# Patient Record
Sex: FEMALE | Race: BLACK OR AFRICAN AMERICAN | Employment: UNEMPLOYED | URBAN - METROPOLITAN AREA
[De-identification: names, ages, dates, MRNs, and addresses within clinical notes are randomized per-mention and may not be internally consistent; named-entity substitution may affect disease eponyms.]

---

## 2017-02-08 ENCOUNTER — PATIENT OUTREACH (OUTPATIENT)
Dept: FAMILY MEDICINE CLINIC | Facility: CLINIC | Age: 82
End: 2017-02-08

## 2017-02-08 NOTE — PROGRESS NOTES
Called patient's pharmacy [facility] at [facility] and [address]. Spoke to pharmacist. [facility] to fax patient's immunization records. Office contact information given to pharmacist at [facility].

## 2017-02-22 ENCOUNTER — HOSPITAL ENCOUNTER (OUTPATIENT)
Dept: LAB | Age: 82
Discharge: HOME | End: 2017-02-22
Payer: MEDICARE

## 2017-02-22 ENCOUNTER — OFFICE VISIT (OUTPATIENT)
Dept: FAMILY MEDICINE CLINIC | Facility: CLINIC | Age: 82
End: 2017-02-22

## 2017-02-22 VITALS
RESPIRATION RATE: 14 BRPM | HEIGHT: 67 IN | TEMPERATURE: 97.5 F | SYSTOLIC BLOOD PRESSURE: 130 MMHG | BODY MASS INDEX: 28.56 KG/M2 | DIASTOLIC BLOOD PRESSURE: 78 MMHG | WEIGHT: 182 LBS | HEART RATE: 71 BPM | OXYGEN SATURATION: 98 %

## 2017-02-22 DIAGNOSIS — E11.9 TYPE 2 DIABETES MELLITUS WITHOUT COMPLICATION, WITHOUT LONG-TERM CURRENT USE OF INSULIN (HCC): Primary | ICD-10-CM

## 2017-02-22 DIAGNOSIS — E78.5 HYPERLIPIDEMIA, UNSPECIFIED HYPERLIPIDEMIA TYPE: ICD-10-CM

## 2017-02-22 DIAGNOSIS — S46.912A SHOULDER STRAIN, LEFT, INITIAL ENCOUNTER: ICD-10-CM

## 2017-02-22 DIAGNOSIS — E11.9 TYPE 2 DIABETES MELLITUS WITHOUT COMPLICATION, WITHOUT LONG-TERM CURRENT USE OF INSULIN (HCC): ICD-10-CM

## 2017-02-22 DIAGNOSIS — I10 ESSENTIAL HYPERTENSION: ICD-10-CM

## 2017-02-22 DIAGNOSIS — S46.911A SHOULDER STRAIN, RIGHT, INITIAL ENCOUNTER: ICD-10-CM

## 2017-02-22 LAB
ALT SERPL-CCNC: 28 U/L (ref 13–56)
ANION GAP BLD CALC-SCNC: 7 MMOL/L (ref 3–18)
APPEARANCE UR: CLEAR
AST SERPL W P-5'-P-CCNC: 16 U/L (ref 15–37)
BILIRUB UR QL: NEGATIVE
BUN SERPL-MCNC: 13 MG/DL (ref 7–18)
BUN/CREAT SERPL: 17 (ref 12–20)
CALCIUM SERPL-MCNC: 9.9 MG/DL (ref 8.5–10.1)
CHLORIDE SERPL-SCNC: 102 MMOL/L (ref 100–108)
CO2 SERPL-SCNC: 31 MMOL/L (ref 21–32)
COLOR UR: YELLOW
CREAT SERPL-MCNC: 0.75 MG/DL (ref 0.6–1.3)
EST. AVERAGE GLUCOSE BLD GHB EST-MCNC: 140 MG/DL
GLUCOSE SERPL-MCNC: 102 MG/DL (ref 74–99)
GLUCOSE UR STRIP.AUTO-MCNC: NEGATIVE MG/DL
HBA1C MFR BLD: 6.5 % (ref 4.2–5.6)
HGB UR QL STRIP: NEGATIVE
KETONES UR QL STRIP.AUTO: NEGATIVE MG/DL
LEUKOCYTE ESTERASE UR QL STRIP.AUTO: NEGATIVE
NITRITE UR QL STRIP.AUTO: NEGATIVE
PH UR STRIP: 5 [PH] (ref 5–8)
POTASSIUM SERPL-SCNC: 4.2 MMOL/L (ref 3.5–5.5)
PROT UR STRIP-MCNC: NEGATIVE MG/DL
SODIUM SERPL-SCNC: 140 MMOL/L (ref 136–145)
SP GR UR REFRACTOMETRY: 1.01 (ref 1–1.03)
UROBILINOGEN UR QL STRIP.AUTO: 0.2 EU/DL (ref 0.2–1)

## 2017-02-22 PROCEDURE — 36415 COLL VENOUS BLD VENIPUNCTURE: CPT | Performed by: FAMILY MEDICINE

## 2017-02-22 PROCEDURE — 84460 ALANINE AMINO (ALT) (SGPT): CPT | Performed by: FAMILY MEDICINE

## 2017-02-22 PROCEDURE — 84450 TRANSFERASE (AST) (SGOT): CPT | Performed by: FAMILY MEDICINE

## 2017-02-22 PROCEDURE — 81003 URINALYSIS AUTO W/O SCOPE: CPT | Performed by: FAMILY MEDICINE

## 2017-02-22 PROCEDURE — 80048 BASIC METABOLIC PNL TOTAL CA: CPT | Performed by: FAMILY MEDICINE

## 2017-02-22 PROCEDURE — 83036 HEMOGLOBIN GLYCOSYLATED A1C: CPT | Performed by: FAMILY MEDICINE

## 2017-02-22 RX ORDER — AMLODIPINE BESYLATE 2.5 MG/1
2.5 TABLET ORAL DAILY
Qty: 90 TAB | Refills: 3 | Status: SHIPPED | OUTPATIENT
Start: 2017-02-22 | End: 2018-02-12 | Stop reason: SDUPTHER

## 2017-02-22 NOTE — PROGRESS NOTES
SUBJECTIVE     Chief Complaint   Patient presents with    Hypertension    Cholesterol Problem    Diabetes       HPI:   Her BP is running good at home. Her glucose is running better with life style. She did not increase Glucophage. Her rhinitis is being treated with Flonase. Her dizziness has not returned. She has shoulder area soreness both front and back since she started swimming more vigorously last several days. No recent injury. Her lipids are being treated. She is following diet for lipids and DM. She denies any hemodynamic symptoms. PMH: Hypertension 2006, DM II 2005, Hyperlipidemia, BRIAN, Chronic Allergic Rhinitis, Iron and Folate Deficiency Anemia, Glaucoma, Thyroid Nodules (neg FNA), Seborrheic Karatosis, Cataract Surgery 2012, Hysterectomy 1993 (Fibroids), Bunionectomy 1995, Herniorrhapy 2002, Colonic Polyps (due 2017). Current Outpatient Prescriptions   Medication Sig    triamcinolone acetonide (KENALOG) 0.1 % topical cream Use as directed.  metFORMIN (GLUCOPHAGE) 500 mg tablet Take 2 Tabs by mouth two (2) times daily (with meals). Indications: type 2 diabetes mellitus (Patient taking differently: Take 500 mg by mouth two (2) times daily (with meals). Indications: type 2 diabetes mellitus)    glucose blood VI test strips (FREESTYLE LITE STRIPS) strip Use as directed to check blood sugar once a day    losartan (COZAAR) 100 mg tablet Take 1 Tab by mouth daily.  cetirizine (ZYRTEC) 10 mg tablet Take 1 Tab by mouth daily.  fluticasone (FLONASE) 50 mcg/actuation nasal spray 2 Sprays by Both Nostrils route two (2) times a day.  atorvastatin (LIPITOR) 10 mg tablet Take 1 Tab by mouth daily.  hydrochlorothiazide (HYDRODIURIL) 12.5 mg tablet Take 1 Tab by mouth daily.  amLODIPine (NORVASC) 2.5 mg tablet Take 1 Tab by mouth daily.     Blood-Glucose Meter (FREESTYLE LITE METER) monitoring kit Use as directed to check blood sugar once a day    Lancets (FREESTYLE LANCETS) misc Use as directed to check blood sugar once a day.  latanoprost (XALATAN) 0.005 % ophthalmic solution Administer 1 Drop to both eyes nightly. No current facility-administered medications for this visit. Allergies: No known allergy to drugs. ROS:   Constitutional: No fever, chills, night sweats, malaise, dizziness. Ear/Nose/Throat: No ear/ sinus pain, lesions, unusual discharge, new speaking or hearing problems, nose bleed. Cardiovascular: No angina, palpitations, PND, orthopnea, lightheadedness, edema, claudication. Respiratory: No dyspnea, wheeze, pleurisy, hemoptysis, unusual cough or sputum. Gastrointestinal: No nausea/ vomiting, bowel habit change, pain, ERA symptoms, melena, hematochezia, anorexia. Psychiatric: No agitation, confusion/disorientation, suicidal or homicidal ideation. Musculoskeletal: No focal weakness. Musculoskeletal: bilateral shoulder area soreness as above; no joint swelling, instability, focal weakness, stiffness/rigidity, radicular pain. OBJECTIVE   Constitutional: General Appearance: well developed, overweight, nontoxic, in no acute distress. Visit Vitals    /78 (BP 1 Location: Right arm, BP Patient Position: Sitting)    Pulse 71    Temp 97.5 °F (36.4 °C) (Oral)    Resp 14    Ht 5' 7\" (1.702 m)    Wt 182 lb (82.6 kg)    SpO2 98%    BMI 28.51 kg/m2     ENT: Otoscopic Examination: external auditory canals are clear; tympanic membranes are dull. Nasal Cavity: mildly swollen mucosa & turbinates. Maxillas are not tender w/o redness or heat. Throat: clear tonsils, oropharynx, posterior pharynx. Pulmonary: Respiratory effort: normal; no dyspnea, no retractions, no accessory muscle use. Auscultation: normal & symmetrical air exchange; no rales, no rhonchi, no wheeze; no rubs     Cardiovascular: Palpation: PMI not displaced or enlarged, no thrills or heaves. Auscultation: RRR; no murmur, rubs or gallops.  Extremities: no edema, no active varicosity. GI[de-identified] Normal bowel sounds. No masses; no tenderness; no rebound/rigidity; no CVA tenderness. Bladder: no fullnesses, tenderness or palpable masses. Psychiatric[de-identified] Oriented to time, place and person. Normal mood, no agitation or anxiety. Normal affect. Pleasant and cooperative. Neurological[de-identified] CN I to XII: intact. DTR: symmetrical & normal.    Musculoskeletal[de-identified] NC/AT, face is symmetrical, neck is supple. Gait/stn: normal.  Both shoulder areas- No heat, effusion, redness or swelling with mild diffuse muscle tenderness, anterior- and posterioly. Good ROM. No instability. Strength OK. Lab Results   Component Value Date/Time    WBC 5.2 08/01/2016 10:14 AM    HGB 13.4 08/01/2016 10:14 AM    HCT 39.4 08/01/2016 10:14 AM    PLATELET 724 44/91/4463 10:14 AM    MCV 77 08/01/2016 10:14 AM     Lab Results   Component Value Date/Time    Sodium 142 08/01/2016 10:14 AM    Potassium 3.9 08/01/2016 10:14 AM    Chloride 101 08/01/2016 10:14 AM    CO2 29 08/01/2016 10:14 AM    Anion gap 11.6 08/01/2016 10:14 AM    Glucose 127 08/01/2016 10:14 AM    BUN 13 08/01/2016 10:14 AM    Creatinine 0.7 08/01/2016 10:14 AM    BUN/Creatinine ratio 18 02/01/2016 12:29 PM    GFR est AA >60 02/01/2016 12:29 PM    GFR est non-AA >60 02/01/2016 12:29 PM    Calcium 9.8 08/01/2016 10:14 AM    Bilirubin, total 0.5 08/01/2016 10:14 AM    AST (SGOT) 21 08/01/2016 10:14 AM    Alk.  phosphatase 87 08/01/2016 10:14 AM    Protein, total 8.0 08/01/2016 10:14 AM    Albumin 3.9 08/01/2016 10:14 AM    Globulin 4.1 08/01/2016 10:14 AM    A-G Ratio 1.0 08/01/2016 10:14 AM    ALT (SGPT) 22 08/01/2016 10:14 AM     Lab Results   Component Value Date/Time    Cholesterol, total 147 08/01/2016 10:14 AM    HDL Cholesterol 81 08/01/2016 10:14 AM    LDL, calculated 53 08/01/2016 10:14 AM    VLDL, calculated 14 08/01/2016 10:14 AM    Triglyceride 69 08/01/2016 10:14 AM     Lab Results   Component Value Date/Time    Hemoglobin A1c 6.8 08/01/2016 10:14 AM    Hemoglobin A1c (POC) 7.1 11/18/2016 11:48 AM      ASSESSMENT     1. Type 2 diabetes mellitus without complication, without long-term current use of insulin ([facility] [address]    2. Essential hypertension    3. Hyperlipidemia, unspecified hyperlipidemia type    4. Shoulder strain, left, initial encounter    5. Shoulder strain, right, initial encounter        PLAN   Pharmacologic Management: Medications reviewed with the patient. No change. Orders Placed This Encounter    METABOLIC PANEL, BASIC    ALT    AST    HEMOGLOBIN A1C WITH EAG    URINALYSIS W/ RFLX MICROSCOPIC    amLODIPine (NORVASC) 2.5 mg tablet     Non-Pharmacologic Management:  Discussed DDx, follow-up & work-up. Discussed risk/benefit & side effect of treatment. Low Salt ADA diet, weightloss & graduated excecise. Gentle shoulder ROM exercises, moist heat and posture care to avoid further strain. Follow up as planned, prn sooner. Regular BS check at home and notify MD of results prn. Health risk from non adherence discussed. Patent voiced understanding. Follow-up Disposition:  Return in about 3 months (around 5/22/2017).     Grant Ang MD

## 2017-02-22 NOTE — PROGRESS NOTES
Mercy Elmore is a [de-identified] y.o.  female presents today for office visit for follow up. Pt is in Room # 4      1. Have you been to the ER, urgent care clinic since your last visit? Hospitalized since your last visit? No    2. Have you seen or consulted any other health care providers outside of the [address] [place] [address] since your last visit? Include any pap smears or colon screening. No    No Patient Care Coordination Note on file.     [facility] reviewed

## 2017-02-23 ENCOUNTER — TELEPHONE (OUTPATIENT)
Dept: FAMILY MEDICINE CLINIC | Facility: CLINIC | Age: 82
End: 2017-02-23

## 2017-05-24 ENCOUNTER — OFFICE VISIT (OUTPATIENT)
Dept: FAMILY MEDICINE CLINIC | Facility: CLINIC | Age: 82
End: 2017-05-24

## 2017-05-24 VITALS
OXYGEN SATURATION: 100 % | SYSTOLIC BLOOD PRESSURE: 130 MMHG | TEMPERATURE: 97.5 F | DIASTOLIC BLOOD PRESSURE: 70 MMHG | HEIGHT: 67 IN | RESPIRATION RATE: 14 BRPM | BODY MASS INDEX: 28.25 KG/M2 | WEIGHT: 180 LBS | HEART RATE: 73 BPM

## 2017-05-24 DIAGNOSIS — E78.5 HYPERLIPIDEMIA, UNSPECIFIED HYPERLIPIDEMIA TYPE: ICD-10-CM

## 2017-05-24 DIAGNOSIS — I10 ESSENTIAL HYPERTENSION: ICD-10-CM

## 2017-05-24 DIAGNOSIS — S89.91XA KNEE INJURY, RIGHT, INITIAL ENCOUNTER: ICD-10-CM

## 2017-05-24 DIAGNOSIS — R42 VERTIGO: ICD-10-CM

## 2017-05-24 DIAGNOSIS — J30.9 CHRONIC ALLERGIC RHINITIS: ICD-10-CM

## 2017-05-24 DIAGNOSIS — E11.9 TYPE 2 DIABETES MELLITUS WITHOUT COMPLICATION, WITHOUT LONG-TERM CURRENT USE OF INSULIN (HCC): Primary | ICD-10-CM

## 2017-05-24 DIAGNOSIS — J01.91 ACUTE RECURRENT SINUSITIS, UNSPECIFIED LOCATION: ICD-10-CM

## 2017-05-24 LAB — HBA1C MFR BLD HPLC: 6.3 %

## 2017-05-24 RX ORDER — MECLIZINE HYDROCHLORIDE 25 MG/1
25 TABLET ORAL
Qty: 30 TAB | Refills: 0 | Status: SHIPPED | OUTPATIENT
Start: 2017-05-24 | End: 2021-05-21 | Stop reason: ALTCHOICE

## 2017-05-24 RX ORDER — DICLOFENAC SODIUM 10 MG/G
GEL TOPICAL
COMMUNITY
Start: 2017-05-19 | End: 2019-03-04

## 2017-05-24 RX ORDER — AMOXICILLIN 500 MG/1
1000 CAPSULE ORAL 2 TIMES DAILY
Qty: 40 CAP | Refills: 0 | Status: SHIPPED | OUTPATIENT
Start: 2017-05-24 | End: 2017-06-03

## 2017-05-24 NOTE — MR AVS SNAPSHOT
Visit Information Date & Time Provider Department Dept. Phone Encounter #  
 5/24/2017 11:00 AM Prakash Carrillo MD Antel[facility] [address] [phone] [id number] Your Appointments 8/24/2017 11:30 AM  
Follow Up with Prakash Carrillo MD  
Antelmo Bernal ([address]) Appt Note: Return in about 3 months (around 8/24/2017). [address] [place] [address] [phone]  
[address] [place] [id number] Upcoming Health Maintenance Date Due DTaP/Tdap/Td series (1 - Tdap) 10/10/1956 ZOSTER VACCINE AGE 60> 10/10/1996 Pneumococcal 65+ Low/Medium Risk (2 of 2 - PPSV23) 3/19/2016 MICROALBUMIN Q1 8/1/2017 LIPID PANEL Q1 8/1/2017 INFLUENZA AGE 9 TO ADULT 8/1/2017 HEMOGLOBIN A1C Q6M 8/22/2017 EYE EXAM RETINAL OR DILATED Q1 10/21/2017 FOOT EXAM Q1 11/18/2017 MEDICARE YEARLY EXAM 11/19/2017 GLAUCOMA SCREENING Q2Y 10/21/2018 Allergies as of 5/24/2017  Review Complete On: 5/24/2017 By: Prakash Carrillo MD  
  
 Severity Noted Reaction Type Reactions Latex, Natural Rubber Medium 11/30/2015    Other (comments) Adhesive  06/08/2015    Contact Dermatitis LEAVES DA ON SKIN 
USE PAPER TAPE Current Immunizations  Reviewed on 6/8/2015 Name Date Pneumococcal Conjugate (PCV-13) 3/19/2015 Not reviewed this visit You Were Diagnosed With   
  
 Codes Comments Type 2 diabetes mellitus without complication, without long-term current use of insulin ([facility])    -  Primary ICD-10-CM: E11.9 ICD-9-CM: 250.00 Vertigo     ICD-10-CM: O73 ICD-9-CM: 780.4 Hyperlipidemia, unspecified hyperlipidemia type     ICD-10-CM: E78.5 ICD-9-CM: 272.4 Essential hypertension     ICD-10-CM: I10 
ICD-9-CM: 401.9 Acute recurrent sinusitis, unspecified location     ICD-10-CM: J01.91 
ICD-9-CM: 461.9 Chronic allergic rhinitis     ICD-10-CM: J30.9 ICD-9-CM: 477.9 Vitals BP Pulse Temp Resp Height(growth percentile) Weight(growth percentile) 130/70 (BP 1 Location: Left arm, BP Patient Position: Sitting) 73 97.5 °F (36.4 °C) (Oral) 14 5' 7\" (1.702 m) 180 lb (81.6 kg) SpO2 BMI OB Status Smoking Status 100% 28.19 kg/m2 Hysterectomy Former Smoker Vitals History BMI and BSA Data Body Mass Index Body Surface Area  
 28.19 kg/m 2 1.96 m 2 Preferred Pharmacy Pharmacy Name Phone [address], Mehrdad Barnhart Julianne [phone] Your Updated Medication List  
  
   
This list is accurate as of: 5/24/17 12:02 PM.  Always use your most recent med list. amLODIPine 2.5 mg tablet Commonly known as:  Alejandro Durie Take 1 Tab by mouth daily. amoxicillin 500 mg capsule Commonly known as:  AMOXIL Take 2 Caps by mouth two (2) times a day for 10 days. atorvastatin 10 mg tablet Commonly known as:  LIPITOR Take 1 Tab by mouth daily. Blood-Glucose Meter monitoring kit Commonly known as:  FREESTYLE LITE METER Use as directed to check blood sugar once a day  
  
 cetirizine 10 mg tablet Commonly known as:  ZYRTEC Take 1 Tab by mouth daily. fluticasone 50 mcg/actuation nasal spray Commonly known as:  Beverly Najjar 2 Sprays by Both Nostrils route two (2) times a day. glucose blood VI test strips strip Commonly known as:  FREESTYLE LITE STRIPS Use as directed to check blood sugar once a day  
  
 hydroCHLOROthiazide 12.5 mg tablet Commonly known as:  HYDRODIURIL Take 1 Tab by mouth daily. Lancets Misc Commonly known as:  FREESTYLE LANCETS Use as directed to check blood sugar once a day. losartan 100 mg tablet Commonly known as:  COZAAR Take 1 Tab by mouth daily. meclizine 25 mg tablet Commonly known as:  ANTIVERT Take 1 Tab by mouth three (3) times daily as needed. metFORMIN 500 mg tablet Commonly known as:  GLUCOPHAGE Take 2 Tabs by mouth two (2) times daily (with meals). Indications: type 2 diabetes mellitus  
  
 triamcinolone acetonide 0.1 % topical cream  
Commonly known as:  KENALOG Use as directed. VOLTAREN 1 % Gel Generic drug:  diclofenac XALATAN 0.005 % ophthalmic solution Generic drug:  latanoprost  
Administer 1 Drop to both eyes nightly. Prescriptions Sent to Pharmacy Refills  
 amoxicillin (AMOXIL) 500 mg capsule 0 Sig: Take 2 Caps by mouth two (2) times a day for 10 days. Class: Normal  
 Pharmacy: [address] [facility] [address] [facility] Ph #: [phone] Route: Oral  
 meclizine (ANTIVERT) 25 mg tablet 0 Sig: Take 1 Tab by mouth three (3) times daily as needed. Class: Normal  
 Pharmacy: [address] [facility] [address] [facility] Ph #: [phone] Route: Oral  
  
We Performed the Following AMB POC HEMOGLOBIN A1C [14909 CPT(R)] Introducing [facility]! Dear Parker Whatley: 
Thank you for requesting a [employer] account. Our records indicate that you already have an active [employer] account. You can access your account anytime at [email or web address] Did you know that you can access your hospital and ER discharge instructions at any time in [employer]? You can also review all of your test results from your hospital stay or ER visit. Additional Information If you have questions, please visit the Frequently Asked Questions section of the [employer] website at [email or web address] Remember, [employer] is NOT to be used for urgent needs. For medical emergencies, dial 911. Now available from your iPhone and Android! Please provide this summary of care documentation to your next provider. Your primary care clinician is listed as [address] [place] [address].  If you have any questions after today's visit, please call [phone].

## 2017-05-24 NOTE — PROGRESS NOTES
Carol Hernández is a [de-identified] y.o.  female presents today for office visit for follow up. Pt is in Room # 4      1. Have you been to the ER, urgent care clinic since your last visit? Hospitalized since your last visit? Yes When: May 11 Where: [facility] Reason for visit: Hurt Knee    2. Have you seen or consulted any other health care providers outside of the [address] [place] [address] since your last visit? Include any pap smears or colon screening. Orthopedic may 17th    No Patient Care Coordination Note on file.

## 2017-05-24 NOTE — PROGRESS NOTES
SUBJECTIVE     Chief Complaint   Patient presents with    Diabetes    Dizziness    Hypertension    Sinus Infection       HPI:     She has occasional mild pain in her face. She also has occasional Lt ear was popping. She had moderate dysbalance / dizziness few days ago. Today the dysbalance is better. She has been using Flonase; but not taking Zyrtec. She had same problem twice in the last 5 years and was better after Rx for sinus infection. She went to ER with RT Knee injury 2 weeks ago. It is better now. She is seeing orthopedist.    Her BP is running good with current Rx. Her glucose is running better with life style and Glucophage. Her lipids are being treated. She is following diet for lipids and DM. No other Systemic, Cardiovascular or Respiratory symptoms. PMH: Hypertension 2006, DM II 2005, Hyperlipidemia, BRIAN, Chronic Allergic Rhinitis, Iron and Folate Deficiency Anemia, Glaucoma, Thyroid Nodules (neg FNA), Seborrheic Karatosis, Cataract Surgery 2012, Hysterectomy 1993 (Fibroids), Bunionectomy 1995, Herniorrhapy 2002, Colonic Polyps (due 2017). Current Outpatient Prescriptions   Medication Sig    VOLTAREN 1 % gel     amLODIPine (NORVASC) 2.5 mg tablet Take 1 Tab by mouth daily.  triamcinolone acetonide (KENALOG) 0.1 % topical cream Use as directed.  metFORMIN (GLUCOPHAGE) 500 mg tablet Take 2 Tabs by mouth two (2) times daily (with meals). Indications: type 2 diabetes mellitus (Patient taking differently: Take 500 mg by mouth two (2) times daily (with meals). Indications: type 2 diabetes mellitus)    glucose blood VI test strips (FREESTYLE LITE STRIPS) strip Use as directed to check blood sugar once a day    losartan (COZAAR) 100 mg tablet Take 1 Tab by mouth daily.  cetirizine (ZYRTEC) 10 mg tablet Take 1 Tab by mouth daily.  fluticasone (FLONASE) 50 mcg/actuation nasal spray 2 Sprays by Both Nostrils route two (2) times a day.     atorvastatin (LIPITOR) 10 mg tablet Take 1 Tab by mouth daily.  hydrochlorothiazide (HYDRODIURIL) 12.5 mg tablet Take 1 Tab by mouth daily.  Blood-Glucose Meter (FREESTYLE LITE METER) monitoring kit Use as directed to check blood sugar once a day    Lancets (FREESTYLE LANCETS) misc Use as directed to check blood sugar once a day.  latanoprost (XALATAN) 0.005 % ophthalmic solution Administer 1 Drop to both eyes nightly. No current facility-administered medications for this visit. Not taking Zyrtec. Allergies: No known allergy to drugs. ROS:   Constitutional: No fever, chills, night sweats, malaise. Ear/Nose/Throat: No ear/ throat pain, lesions, unusual discharge, new speaking or hearing problems, nose bleed. Cardiovascular: No angina, palpitations, PND, orthopnea, lightheadedness, edema, claudication. Respiratory: No dyspnea, wheeze, pleurisy, hemoptysis, unusual cough or sputum. Gastrointestinal: No nausea/ vomiting, bowel habit change, pain, ERA symptoms, melena, hematochezia, anorexia. Psychiatric: No agitation, confusion/disorientation, suicidal or homicidal ideation. Musculoskeletal: No focal weakness. Musculoskeletal: no other complaints. OBJECTIVE   Constitutional: General Appearance: well developed, overweight, nontoxic, in no acute distress. Visit Vitals    /70 (BP 1 Location: Left arm, BP Patient Position: Sitting)    Pulse 73    Temp 97.5 °F (36.4 °C) (Oral)    Resp 14    Ht 5' 7\" (1.702 m)    Wt 180 lb (81.6 kg)    SpO2 100%    BMI 28.19 kg/m2     ENT: Otoscopic Examination: external auditory canals are clear; tympanic membranes are dull. Nasal Cavity: mildly swollen mucosa & turbinates. Maxillas are not tender w/o redness or heat. Throat: clear tonsils, oropharynx, posterior pharynx. Pulmonary: Respiratory effort: normal; no dyspnea, no retractions, no accessory muscle use.  Auscultation: normal & symmetrical air exchange; no rales, no rhonchi, no wheeze; no rubs Cardiovascular: Palpation: PMI not displaced or enlarged, no thrills or heaves. Auscultation: RRR; no murmur, rubs or gallops. Extremities: no edema, no active varicosity. GI[de-identified] Normal bowel sounds. No masses; no tenderness; no rebound/rigidity; no CVA tenderness. Bladder: no fullnesses, tenderness or palpable masses. Psychiatric[de-identified] Oriented to time, place and person. Normal mood, no agitation or anxiety. Normal affect. Pleasant and cooperative. Neurological[de-identified] CN I to XII: intact. DTR: symmetrical & normal.    Musculoskeletal[de-identified] NC/AT, face is symmetrical, neck is supple. Gait/stn: good w/ Rt Knee in splint. No effusion, redness or heat. Lab Results   Component Value Date/Time    WBC 5.2 08/01/2016 10:14 AM    HGB 13.4 08/01/2016 10:14 AM    HCT 39.4 08/01/2016 10:14 AM    PLATELET 969 04/18/2976 10:14 AM    MCV 77 08/01/2016 10:14 AM     Lab Results   Component Value Date/Time    Sodium 140 02/22/2017 11:46 AM    Potassium 4.2 02/22/2017 11:46 AM    Chloride 102 02/22/2017 11:46 AM    CO2 31 02/22/2017 11:46 AM    Anion gap 7 02/22/2017 11:46 AM    Glucose 102 02/22/2017 11:46 AM    BUN 13 02/22/2017 11:46 AM    Creatinine 0.75 02/22/2017 11:46 AM    BUN/Creatinine ratio 17 02/22/2017 11:46 AM    GFR est AA >60 02/22/2017 11:46 AM    GFR est non-AA >60 02/22/2017 11:46 AM    Calcium 9.9 02/22/2017 11:46 AM    Bilirubin, total 0.5 08/01/2016 10:14 AM    AST (SGOT) 16 02/22/2017 11:46 AM    Alk.  phosphatase 87 08/01/2016 10:14 AM    Protein, total 8.0 08/01/2016 10:14 AM    Albumin 3.9 08/01/2016 10:14 AM    Globulin 4.1 08/01/2016 10:14 AM    A-G Ratio 1.0 08/01/2016 10:14 AM    ALT (SGPT) 28 02/22/2017 11:46 AM     Lab Results   Component Value Date/Time    Cholesterol, total 147 08/01/2016 10:14 AM    HDL Cholesterol 81 08/01/2016 10:14 AM    LDL, calculated 53 08/01/2016 10:14 AM    VLDL, calculated 14 08/01/2016 10:14 AM    Triglyceride 69 08/01/2016 10:14 AM     Lab Results   Component Value Date/Time    Hemoglobin A1c 6.5 02/22/2017 11:46 AM    Hemoglobin A1c (POC) 6.3 05/24/2017 11:59 AM        ASSESSMENT     1. Type 2 diabetes mellitus without complication, without long-term current use of insulin ([facility])    2. Vertigo    3. Hyperlipidemia, unspecified hyperlipidemia type    4. Essential hypertension    5. Acute recurrent sinusitis, unspecified location    6. Chronic allergic rhinitis    7. Knee injury, right, initial encounter        PLAN   Pharmacologic Management: Medications reviewed with the patient. Amox 1 gm bid; Antivert 25 tid with caution. Orders Placed This Encounter    AMB POC HEMOGLOBIN A1C    VOLTAREN 1 % gel    amoxicillin (AMOXIL) 500 mg capsule    meclizine (ANTIVERT) 25 mg tablet     Patient to consider referral to ENT. Discussed DDx, follow-up & work-up. Discussed risk/benefit & side effect of treatment. Low Salt ADA diet, weightloss & graduated excecise. Follow up as planned, prn sooner. Regular BS check at home and notify MD of results prn. Health risk from non adherence discussed. Patent voiced understanding. Follow-up Disposition:  Return in about 3 months (around 8/24/2017), or if symptoms worsen or fail to improve.     Harmony Randolph MD

## 2017-06-06 ENCOUNTER — TELEPHONE (OUTPATIENT)
Dept: FAMILY MEDICINE CLINIC | Facility: CLINIC | Age: 82
End: 2017-06-06

## 2017-06-06 DIAGNOSIS — R42 DIZZINESS: Primary | ICD-10-CM

## 2017-06-22 ENCOUNTER — OFFICE VISIT (OUTPATIENT)
Dept: FAMILY MEDICINE CLINIC | Facility: CLINIC | Age: 82
End: 2017-06-22

## 2017-06-22 VITALS
TEMPERATURE: 98.5 F | RESPIRATION RATE: 16 BRPM | OXYGEN SATURATION: 98 % | SYSTOLIC BLOOD PRESSURE: 130 MMHG | HEIGHT: 67 IN | HEART RATE: 70 BPM | WEIGHT: 178 LBS | BODY MASS INDEX: 27.94 KG/M2 | DIASTOLIC BLOOD PRESSURE: 82 MMHG

## 2017-06-22 DIAGNOSIS — H81.312 VERTIGO, AURAL, LEFT: Primary | ICD-10-CM

## 2017-06-22 NOTE — PROGRESS NOTES
Rome Cortes is a [de-identified] y.o.  female presents today for office visit for acute care. Pt is in Room # 6      1. Have you been to the ER, urgent care clinic since your last visit? Hospitalized since your last visit? No    2. Have you seen or consulted any other health care providers outside of the [address] [place] [address] since your last visit? Include any pap smears or colon screening. No    No Patient Care Coordination Note on file.

## 2017-06-22 NOTE — PROGRESS NOTES
SUBJECTIVE     Chief Complaint   Patient presents with    Dizziness       HPI:     She has occasional mild pain in her face. She also has occasional Lt ear was popping. She had moderate dysbalance / dizziness 4-5 weeks ago. She had same problem twice in the last 5 years and was better after Rx for sinus infection. She was seen here 4 weeks ago and given Amox for sinusitis. She still has the dizziness. She has been referred; but has not been seen there yet. She denies any other focal neurologic symptoms. Her BP is running good with current Rx. Her glucose is running good with life style and Glucophage. No other Systemic, Cardiovascular or Respiratory symptoms. PMH: Hypertension 2006, DM II 2005, Hyperlipidemia, BRIAN, Chronic Allergic Rhinitis, Iron and Folate Deficiency Anemia, Glaucoma, Thyroid Nodules (neg FNA), Seborrheic Karatosis, Cataract Surgery 2012, Hysterectomy 1993 (Fibroids), Bunionectomy 1995, Herniorrhapy 2002, Colonic Polyps (due 2017). Current Outpatient Prescriptions   Medication Sig    VOLTAREN 1 % gel     meclizine (ANTIVERT) 25 mg tablet Take 1 Tab by mouth three (3) times daily as needed.  amLODIPine (NORVASC) 2.5 mg tablet Take 1 Tab by mouth daily.  triamcinolone acetonide (KENALOG) 0.1 % topical cream Use as directed.  metFORMIN (GLUCOPHAGE) 500 mg tablet Take 2 Tabs by mouth two (2) times daily (with meals). Indications: type 2 diabetes mellitus (Patient taking differently: Take 500 mg by mouth two (2) times daily (with meals). Indications: type 2 diabetes mellitus)    glucose blood VI test strips (FREESTYLE LITE STRIPS) strip Use as directed to check blood sugar once a day    losartan (COZAAR) 100 mg tablet Take 1 Tab by mouth daily.  cetirizine (ZYRTEC) 10 mg tablet Take 1 Tab by mouth daily.  fluticasone (FLONASE) 50 mcg/actuation nasal spray 2 Sprays by Both Nostrils route two (2) times a day.     atorvastatin (LIPITOR) 10 mg tablet Take 1 Tab by mouth daily.  hydrochlorothiazide (HYDRODIURIL) 12.5 mg tablet Take 1 Tab by mouth daily.  Blood-Glucose Meter (FREESTYLE LITE METER) monitoring kit Use as directed to check blood sugar once a day    Lancets (FREESTYLE LANCETS) misc Use as directed to check blood sugar once a day.  latanoprost (XALATAN) 0.005 % ophthalmic solution Administer 1 Drop to both eyes nightly. No current facility-administered medications for this visit. Not taking Zyrtec. Allergies: No known allergy to drugs. ROS:   Constitutional: No fever, chills, night sweats, malaise. Ear/Nose/Throat: No ear/ throat pain, lesions, unusual discharge, new speaking or hearing problems, nose bleed. Cardiovascular: No angina, palpitations, PND, orthopnea, lightheadedness, edema, claudication. Respiratory: No dyspnea, wheeze, pleurisy, hemoptysis, unusual cough or sputum. Gastrointestinal: No nausea/ vomiting, bowel habit change, pain, ERA symptoms, melena, hematochezia, anorexia. Psychiatric: No agitation, confusion/disorientation, suicidal or homicidal ideation. Musculoskeletal: No focal weakness. Musculoskeletal: no other complaints. OBJECTIVE   Constitutional: General Appearance: well developed, overweight, nontoxic, in no acute distress. Visit Vitals    /82 (BP 1 Location: Left arm, BP Patient Position: Sitting)    Pulse 70    Resp 16    Ht 5' 7\" (1.702 m)    Wt 178 lb (80.7 kg)    SpO2 98%    BMI 27.88 kg/m2     ENT: Otoscopic Examination: external auditory canals are clear; tympanic membranes are dull (Lt>>Rt). Nasal Cavity: mildly swollen mucosa & turbinates. Maxillas are not tender w/o redness or heat. Throat: clear tonsils, oropharynx, posterior pharynx. Pulmonary: Respiratory effort: normal; no dyspnea, no retractions, no accessory muscle use.  Auscultation: normal & symmetrical air exchange; no rales, no rhonchi, no wheeze; no rubs     Cardiovascular: Palpation: PMI not displaced or enlarged, no thrills or heaves. Auscultation: RRR; no murmur, rubs or gallops. Extremities: no edema, no active varicosity. GI[de-identified] Normal bowel sounds. No masses; no tenderness; no rebound/rigidity; no CVA tenderness. Bladder: no fullnesses, tenderness or palpable masses. Psychiatric[de-identified] Oriented to time, place and person. Normal mood, no agitation or anxiety. Normal affect. Pleasant and cooperative. Neurological[de-identified] CN I to XII: intact. DTR: symmetrical & normal.  Head tilt to Lt reproduces the symptom. No sustained nystagmus. Musculoskeletal[de-identified] NC/AT, face is symmetrical, neck is supple. Gait/stn: good w/ Rt Knee in splint. No effusion, redness or heat. Lab Results   Component Value Date/Time    WBC 5.2 08/01/2016 10:14 AM    HGB 13.4 08/01/2016 10:14 AM    HCT 39.4 08/01/2016 10:14 AM    PLATELET 418 46/39/3775 10:14 AM    MCV 77 08/01/2016 10:14 AM     Lab Results   Component Value Date/Time    Sodium 140 02/22/2017 11:46 AM    Potassium 4.2 02/22/2017 11:46 AM    Chloride 102 02/22/2017 11:46 AM    CO2 31 02/22/2017 11:46 AM    Anion gap 7 02/22/2017 11:46 AM    Glucose 102 02/22/2017 11:46 AM    BUN 13 02/22/2017 11:46 AM    Creatinine 0.75 02/22/2017 11:46 AM    BUN/Creatinine ratio 17 02/22/2017 11:46 AM    GFR est AA >60 02/22/2017 11:46 AM    GFR est non-AA >60 02/22/2017 11:46 AM    Calcium 9.9 02/22/2017 11:46 AM    Bilirubin, total 0.5 08/01/2016 10:14 AM    AST (SGOT) 16 02/22/2017 11:46 AM    Alk.  phosphatase 87 08/01/2016 10:14 AM    Protein, total 8.0 08/01/2016 10:14 AM    Albumin 3.9 08/01/2016 10:14 AM    Globulin 4.1 08/01/2016 10:14 AM    A-G Ratio 1.0 08/01/2016 10:14 AM    ALT (SGPT) 28 02/22/2017 11:46 AM     Lab Results   Component Value Date/Time    Cholesterol, total 147 08/01/2016 10:14 AM    HDL Cholesterol 81 08/01/2016 10:14 AM    LDL, calculated 53 08/01/2016 10:14 AM    VLDL, calculated 14 08/01/2016 10:14 AM    Triglyceride 69 08/01/2016 10:14 AM     Lab Results Component Value Date/Time    Hemoglobin A1c 6.5 02/22/2017 11:46 AM    Hemoglobin A1c (POC) 6.3 05/24/2017 11:59 AM        ASSESSMENT     1. Vertigo, aural, left        PLAN   Pharmacologic Management: Medications reviewed with the patient. Orders Placed This Encounter    REFERRAL TO ENT-OTOLARYNGOLOGY     Patient to consider referral to ENT. Discussed DDx, follow-up & work-up. Discussed risk/benefit & side effect of treatment. Low Salt ADA diet, weightloss & graduated excecise. Follow up as planned, prn sooner. PRN to ER. Regular BS check at home and notify MD of results prn. Health risk from non adherence discussed. Patent voiced understanding.     Feng Back MD

## 2017-07-31 RX ORDER — LOSARTAN POTASSIUM 100 MG/1
100 TABLET ORAL DAILY
Qty: 90 TAB | Refills: 1 | Status: SHIPPED | OUTPATIENT
Start: 2017-07-31 | End: 2018-02-12 | Stop reason: SDUPTHER

## 2017-07-31 NOTE — TELEPHONE ENCOUNTER
Pt called requesting a refill of /cozaar to be sent electronically to     [address] [facility], [facility] [address]  [address] [place] [phone]  Phone: [phone] Fax: [phone]

## 2017-08-21 ENCOUNTER — OFFICE VISIT (OUTPATIENT)
Dept: FAMILY MEDICINE CLINIC | Facility: CLINIC | Age: 82
End: 2017-08-21

## 2017-08-21 ENCOUNTER — HOSPITAL ENCOUNTER (OUTPATIENT)
Dept: LAB | Age: 82
Discharge: HOME | End: 2017-08-21
Payer: MEDICARE

## 2017-08-21 VITALS
SYSTOLIC BLOOD PRESSURE: 142 MMHG | RESPIRATION RATE: 16 BRPM | HEIGHT: 67 IN | DIASTOLIC BLOOD PRESSURE: 78 MMHG | TEMPERATURE: 98.3 F | OXYGEN SATURATION: 97 % | WEIGHT: 181 LBS | BODY MASS INDEX: 28.41 KG/M2 | HEART RATE: 72 BPM

## 2017-08-21 DIAGNOSIS — J30.9 CHRONIC ALLERGIC RHINITIS: ICD-10-CM

## 2017-08-21 DIAGNOSIS — E11.9 TYPE 2 DIABETES MELLITUS WITHOUT COMPLICATION, WITHOUT LONG-TERM CURRENT USE OF INSULIN (HCC): Primary | ICD-10-CM

## 2017-08-21 DIAGNOSIS — E78.5 HYPERLIPIDEMIA, UNSPECIFIED HYPERLIPIDEMIA TYPE: ICD-10-CM

## 2017-08-21 DIAGNOSIS — I10 ESSENTIAL HYPERTENSION: ICD-10-CM

## 2017-08-21 DIAGNOSIS — E11.9 TYPE 2 DIABETES MELLITUS WITHOUT COMPLICATION, WITHOUT LONG-TERM CURRENT USE OF INSULIN (HCC): ICD-10-CM

## 2017-08-21 LAB
ALBUMIN SERPL-MCNC: 3.6 G/DL (ref 3.4–5)
ALBUMIN/GLOB SERPL: 1 {RATIO} (ref 0.8–1.7)
ALP SERPL-CCNC: 71 U/L (ref 45–117)
ALT SERPL-CCNC: 24 U/L (ref 13–56)
ANION GAP SERPL CALC-SCNC: 7 MMOL/L (ref 3–18)
AST SERPL-CCNC: 18 U/L (ref 15–37)
BASOPHILS # BLD: 0 K/UL (ref 0–0.06)
BASOPHILS NFR BLD: 1 % (ref 0–2)
BILIRUB SERPL-MCNC: 0.3 MG/DL (ref 0.2–1)
BUN SERPL-MCNC: 11 MG/DL (ref 7–18)
BUN/CREAT SERPL: 15 (ref 12–20)
CALCIUM SERPL-MCNC: 9.2 MG/DL (ref 8.5–10.1)
CHLORIDE SERPL-SCNC: 104 MMOL/L (ref 100–108)
CO2 SERPL-SCNC: 30 MMOL/L (ref 21–32)
CREAT SERPL-MCNC: 0.73 MG/DL (ref 0.6–1.3)
DIFFERENTIAL METHOD BLD: NORMAL
EOSINOPHIL # BLD: 0.1 K/UL (ref 0–0.4)
EOSINOPHIL NFR BLD: 1 % (ref 0–5)
ERYTHROCYTE [DISTWIDTH] IN BLOOD BY AUTOMATED COUNT: 14.2 % (ref 11.6–14.5)
EST. AVERAGE GLUCOSE BLD GHB EST-MCNC: 143 MG/DL
GLOBULIN SER CALC-MCNC: 3.5 G/DL (ref 2–4)
GLUCOSE SERPL-MCNC: 71 MG/DL (ref 74–99)
HBA1C MFR BLD: 6.6 % (ref 4.2–5.6)
HCT VFR BLD AUTO: 37.2 % (ref 35–45)
HGB BLD-MCNC: 12.8 G/DL (ref 12–16)
LYMPHOCYTES # BLD: 2.5 K/UL (ref 0.9–3.6)
LYMPHOCYTES NFR BLD: 46 % (ref 21–52)
MCH RBC QN AUTO: 26.8 PG (ref 24–34)
MCHC RBC AUTO-ENTMCNC: 34.4 G/DL (ref 31–37)
MCV RBC AUTO: 77.8 FL (ref 74–97)
MONOCYTES # BLD: 0.5 K/UL (ref 0.05–1.2)
MONOCYTES NFR BLD: 10 % (ref 3–10)
NEUTS SEG # BLD: 2.2 K/UL (ref 1.8–8)
NEUTS SEG NFR BLD: 42 % (ref 40–73)
PLATELET # BLD AUTO: 212 K/UL (ref 135–420)
PMV BLD AUTO: 10.8 FL (ref 9.2–11.8)
POTASSIUM SERPL-SCNC: 3.8 MMOL/L (ref 3.5–5.5)
PROT SERPL-MCNC: 7.1 G/DL (ref 6.4–8.2)
RBC # BLD AUTO: 4.78 M/UL (ref 4.2–5.3)
SODIUM SERPL-SCNC: 141 MMOL/L (ref 136–145)
TSH SERPL DL<=0.05 MIU/L-ACNC: 1.4 UIU/ML (ref 0.36–3.74)
WBC # BLD AUTO: 5.3 K/UL (ref 4.6–13.2)

## 2017-08-21 PROCEDURE — 84443 ASSAY THYROID STIM HORMONE: CPT | Performed by: FAMILY MEDICINE

## 2017-08-21 PROCEDURE — 85025 COMPLETE CBC W/AUTO DIFF WBC: CPT | Performed by: FAMILY MEDICINE

## 2017-08-21 PROCEDURE — 36415 COLL VENOUS BLD VENIPUNCTURE: CPT | Performed by: FAMILY MEDICINE

## 2017-08-21 PROCEDURE — 83036 HEMOGLOBIN GLYCOSYLATED A1C: CPT | Performed by: FAMILY MEDICINE

## 2017-08-21 PROCEDURE — 80053 COMPREHEN METABOLIC PANEL: CPT | Performed by: FAMILY MEDICINE

## 2017-08-21 RX ORDER — ATORVASTATIN CALCIUM 10 MG/1
10 TABLET, FILM COATED ORAL DAILY
Qty: 90 TAB | Refills: 3 | Status: SHIPPED | OUTPATIENT
Start: 2017-08-21 | End: 2018-08-30 | Stop reason: SDUPTHER

## 2017-08-21 NOTE — MR AVS SNAPSHOT
Visit Information Date & Time Provider Department Dept. Phone Encounter #  
 8/21/2017  1:30 PM Judge Aram MD Antelmo Bernal [phone] [id number] Follow-up Instructions Return in about 3 months (around 11/21/2017) for Fasting. Follow-up and Disposition History Upcoming Health Maintenance Date Due DTaP/Tdap/Td series (1 - Tdap) 10/10/1956 ZOSTER VACCINE AGE 60> 8/10/1996 Pneumococcal 65+ Low/Medium Risk (2 of 2 - PPSV23) 3/19/2016 MICROALBUMIN Q1 8/1/2017 LIPID PANEL Q1 8/1/2017 INFLUENZA AGE 9 TO ADULT 8/1/2017 EYE EXAM RETINAL OR DILATED Q1 10/21/2017 FOOT EXAM Q1 11/18/2017 MEDICARE YEARLY EXAM 11/19/2017 HEMOGLOBIN A1C Q6M 11/24/2017 GLAUCOMA SCREENING Q2Y 10/21/2018 Allergies as of 8/21/2017  Review Complete On: 8/21/2017 By: Judge Aram MD  
  
 Severity Noted Reaction Type Reactions Latex, Natural Rubber Medium 11/30/2015    Other (comments) Adhesive  06/08/2015    Contact Dermatitis LEAVES DA ON SKIN 
USE PAPER TAPE Current Immunizations  Reviewed on 6/8/2015 Name Date Pneumococcal Conjugate (PCV-13) 3/19/2015 Not reviewed this visit You Were Diagnosed With   
  
 Codes Comments Type 2 diabetes mellitus without complication, without long-term current use of insulin (HCC)    -  Primary ICD-10-CM: E11.9 ICD-9-CM: 250.00 Essential hypertension     ICD-10-CM: I10 
ICD-9-CM: 401.9 Hyperlipidemia, unspecified hyperlipidemia type     ICD-10-CM: E78.5 ICD-9-CM: 272.4 Chronic allergic rhinitis     ICD-10-CM: J30.9 ICD-9-CM: 477.9 Vitals BP Pulse Temp Resp Height(growth percentile) Weight(growth percentile) 142/78 (BP 1 Location: Right arm, BP Patient Position: Sitting) 72 98.3 °F (36.8 °C) (Oral) 16 5' 7\" (1.702 m) 181 lb (82.1 kg) SpO2 BMI OB Status Smoking Status 97% 28.35 kg/m2 Hysterectomy Former Smoker Vitals History BMI and BSA Data Body Mass Index Body Surface Area  
 28.35 kg/m 2 1.97 m 2 Preferred Pharmacy Pharmacy Name Phone [address], [place] Rod[place] [phone] Your Updated Medication List  
  
   
This list is accurate as of: 8/21/17  2:14 PM.  Always use your most recent med list. amLODIPine 2.5 mg tablet Commonly known as:  Luis Eduardo Anju Take 1 Tab by mouth daily. atorvastatin 10 mg tablet Commonly known as:  LIPITOR Take 1 Tab by mouth daily. Blood-Glucose Meter monitoring kit Commonly known as:  FREESTYLE LITE METER Use as directed to check blood sugar once a day  
  
 cetirizine 10 mg tablet Commonly known as:  ZYRTEC Take 1 Tab by mouth daily. fluticasone 50 mcg/actuation nasal spray Commonly known as:  Ruthe Para 2 Sprays by Both Nostrils route two (2) times a day. glucose blood VI test strips strip Commonly known as:  FREESTYLE LITE STRIPS Use as directed to check blood sugar once a day  
  
 hydroCHLOROthiazide 12.5 mg tablet Commonly known as:  HYDRODIURIL Take 1 Tab by mouth daily. Lancets Misc Commonly known as:  FREESTYLE LANCETS Use as directed to check blood sugar once a day. losartan 100 mg tablet Commonly known as:  COZAAR Take 1 Tab by mouth daily. meclizine 25 mg tablet Commonly known as:  ANTIVERT Take 1 Tab by mouth three (3) times daily as needed. metFORMIN 500 mg tablet Commonly known as:  GLUCOPHAGE Take 2 Tabs by mouth two (2) times daily (with meals). Indications: type 2 diabetes mellitus  
  
 triamcinolone acetonide 0.1 % topical cream  
Commonly known as:  KENALOG Use as directed. VOLTAREN 1 % Gel Generic drug:  diclofenac XALATAN 0.005 % ophthalmic solution Generic drug:  latanoprost  
Administer 1 Drop to both eyes nightly. Prescriptions Sent to Pharmacy Refills atorvastatin (LIPITOR) 10 mg tablet 3 Sig: Take 1 Tab by mouth daily. Class: Normal  
 [address], [place]  #: [phone] Route: Oral  
  
Follow-up Instructions Return in about 3 months (around 11/21/2017) for Fasting. To-Do List   
 08/21/2017 Lab:  MICROALBUMIN, UR, RAND W/ MICROALBUMIN/CREA RATIO   
  
 08/21/2017 Lab:  URINALYSIS W/ RFLX MICROSCOPIC Introducing [facility] SERVICES! Dear Mattie Dus: 
Thank you for requesting a [employer]! account. Our records indicate that you already have an active J[employer]! account. You can access your account anytime at [email or web address] Did you know that you can access your hospital and ER discharge instructions at any time in Jamba!? You can also review all of your test results from your hospital stay or ER visit. Additional Information If you have questions, please visit the Frequently Asked Questions section of the Jamba! website at [email or web address] Remember, Jamba! is NOT to be used for urgent needs. For medical emergencies, dial 911. Now available from your iPhone and Android! Please provide this summary of care documentation to your next provider. Your primary care clinician is listed as [address] [place] [address]. If you have any questions after today's visit, please call [phone].

## 2017-08-21 NOTE — PROGRESS NOTES
SUBJECTIVE     Chief Complaint   Patient presents with    Diabetes    Hypertension    Dizziness       HPI:     She still has moderate dysbalance / dizziness. She is following up ENT. She has been using Flonase; but not taking Zyrtec. Her BP is running good at home with current Rx. Her glucose is running better with life style and Glucophage. Her lipids are being treated. She is following diet for lipids and DM. No other Systemic, Cardiovascular or Respiratory symptoms. PMH: Hypertension 2006, DM II 2005, Hyperlipidemia, BRIAN, Chronic Allergic Rhinitis, Iron and Folate Deficiency Anemia, Glaucoma, Thyroid Nodules (neg FNA), Seborrheic Karatosis, Cataract Surgery 2012, Hysterectomy 1993 (Fibroids), Bunionectomy 1995, Herniorrhapy 2002, Colonic Polyps (due 2017). Current Outpatient Prescriptions   Medication Sig    losartan (COZAAR) 100 mg tablet Take 1 Tab by mouth daily.  VOLTAREN 1 % gel     meclizine (ANTIVERT) 25 mg tablet Take 1 Tab by mouth three (3) times daily as needed.  amLODIPine (NORVASC) 2.5 mg tablet Take 1 Tab by mouth daily.  triamcinolone acetonide (KENALOG) 0.1 % topical cream Use as directed.  metFORMIN (GLUCOPHAGE) 500 mg tablet Take 2 Tabs by mouth two (2) times daily (with meals). Indications: type 2 diabetes mellitus (Patient taking differently: Take 500 mg by mouth two (2) times daily (with meals). Indications: type 2 diabetes mellitus)    glucose blood VI test strips (FREESTYLE LITE STRIPS) strip Use as directed to check blood sugar once a day    cetirizine (ZYRTEC) 10 mg tablet Take 1 Tab by mouth daily.  fluticasone (FLONASE) 50 mcg/actuation nasal spray 2 Sprays by Both Nostrils route two (2) times a day.  atorvastatin (LIPITOR) 10 mg tablet Take 1 Tab by mouth daily.  hydrochlorothiazide (HYDRODIURIL) 12.5 mg tablet Take 1 Tab by mouth daily.     Blood-Glucose Meter (FREESTYLE LITE METER) monitoring kit Use as directed to check blood sugar once a day    Lancets (FREESTYLE LANCETS) [facility] Use as directed to check blood sugar once a day.  latanoprost (XALATAN) 0.005 % ophthalmic solution Administer 1 Drop to both eyes nightly. No current facility-administered medications for this visit. Not taking Zyrtec. Allergies: No known allergy to drugs. ROS:   Constitutional: No fever, chills, night sweats, malaise. Ear/Nose/Throat: No ear/ throat pain, lesions, unusual discharge, new speaking or hearing problems, nose bleed. Cardiovascular: No angina, palpitations, PND, orthopnea, lightheadedness, edema, claudication. Respiratory: No dyspnea, wheeze, pleurisy, hemoptysis, unusual cough or sputum. Gastrointestinal: No nausea/ vomiting, bowel habit change, pain, ERA symptoms, melena, hematochezia, anorexia. Psychiatric: No agitation, confusion/disorientation, suicidal or homicidal ideation. Musculoskeletal: No focal weakness. Musculoskeletal: no other complaints. OBJECTIVE   Constitutional: General Appearance: well developed, overweight, nontoxic, in no acute distress. Visit Vitals    /78 (BP 1 Location: Right arm, BP Patient Position: Sitting)    Pulse 72    Temp 98.3 °F (36.8 °C) (Oral)    Resp 16    Ht 5' 7\" (1.702 m)    Wt 181 lb (82.1 kg)    SpO2 97%    BMI 28.35 kg/m2     ENT: Otoscopic Examination: external auditory canals are clear; tympanic membranes are dull. Nasal Cavity: mildly swollen mucosa & turbinates. Maxillas are not tender w/o redness or heat. Throat: clear tonsils, oropharynx, posterior pharynx. Pulmonary: Respiratory effort: normal; no dyspnea, no retractions, no accessory muscle use. Auscultation: normal & symmetrical air exchange; no rales, no rhonchi, no wheeze; no rubs     Cardiovascular: Palpation: PMI not displaced or enlarged, no thrills or heaves. Auscultation: RRR; no murmur, rubs or gallops. Extremities: no edema, no active varicosity. GI[de-identified] Normal bowel sounds.   No masses; no tenderness; no rebound/rigidity; no CVA tenderness. Bladder: no fullnesses, tenderness or palpable masses. Psychiatric[de-identified] Oriented to time, place and person. Normal mood, no agitation or anxiety. Normal affect. Pleasant and cooperative. Neurological[de-identified] CN I to XII: intact. DTR: symmetrical & normal.    Musculoskeletal[de-identified] NC/AT, face is symmetrical, neck is supple. .    Lab Results   Component Value Date/Time    WBC 5.2 08/01/2016 10:14 AM    HGB 13.4 08/01/2016 10:14 AM    HCT 39.4 08/01/2016 10:14 AM    PLATELET 438 41/32/2698 10:14 AM    MCV 77 08/01/2016 10:14 AM     Lab Results   Component Value Date/Time    Sodium 140 02/22/2017 11:46 AM    Potassium 4.2 02/22/2017 11:46 AM    Chloride 102 02/22/2017 11:46 AM    CO2 31 02/22/2017 11:46 AM    Anion gap 7 02/22/2017 11:46 AM    Glucose 102 02/22/2017 11:46 AM    BUN 13 02/22/2017 11:46 AM    Creatinine 0.75 02/22/2017 11:46 AM    BUN/Creatinine ratio 17 02/22/2017 11:46 AM    GFR est AA >60 02/22/2017 11:46 AM    GFR est non-AA >60 02/22/2017 11:46 AM    Calcium 9.9 02/22/2017 11:46 AM    Bilirubin, total 0.5 08/01/2016 10:14 AM    AST (SGOT) 16 02/22/2017 11:46 AM    Alk. phosphatase 87 08/01/2016 10:14 AM    Protein, total 8.0 08/01/2016 10:14 AM    Albumin 3.9 08/01/2016 10:14 AM    Globulin 4.1 08/01/2016 10:14 AM    A-G Ratio 1.0 08/01/2016 10:14 AM    ALT (SGPT) 28 02/22/2017 11:46 AM     Lab Results   Component Value Date/Time    Cholesterol, total 147 08/01/2016 10:14 AM    HDL Cholesterol 81 08/01/2016 10:14 AM    LDL, calculated 53 08/01/2016 10:14 AM    VLDL, calculated 14 08/01/2016 10:14 AM    Triglyceride 69 08/01/2016 10:14 AM     Lab Results   Component Value Date/Time    Hemoglobin A1c 6.5 02/22/2017 11:46 AM    Hemoglobin A1c (POC) 6.3 05/24/2017 11:59 AM        ASSESSMENT     1. Type 2 diabetes mellitus without complication, without long-term current use of insulin ([facility] [address]    2. Essential hypertension    3.  Hyperlipidemia, unspecified hyperlipidemia type    4. Chronic allergic rhinitis        PLAN   Pharmacologic Management: Medications reviewed with the patient. Orders Placed This Encounter    MICROALBUMIN, UR, RAND W/ MICROALBUMIN/CREA RATIO    CBC WITH AUTOMATED DIFF    METABOLIC PANEL, COMPREHENSIVE    TSH 3RD GENERATION    HEMOGLOBIN A1C WITH EAG    URINALYSIS W/ RFLX MICROSCOPIC    atorvastatin (LIPITOR) 10 mg tablet     Discussed DDx, follow-up & work-up. Discussed risk/benefit & side effect of treatment. Low Salt ADA diet, weightloss & graduated excecise. Follow up as planned, prn sooner. Regular BS check at home and notify MD of results prn. Health risk from non adherence discussed. Patent voiced understanding. Follow-up Disposition:  Return in about 3 months (around 11/21/2017).     Paulino Bone MD

## 2017-08-21 NOTE — PROGRESS NOTES
Autumn Carvajal is a [de-identified] y.o.  female presents today for office visit for routine follow up. Pt is in Room # 4.      1. Have you been to the ER, urgent care clinic since your last visit? Hospitalized since your last visit? No      2. Have you seen or consulted any other health care providers outside of the [address] [place] [address] since your last visit? Include any pap smears or colon screening. Dentist, Dr. Jacquie Jo and Cardiology Dr. Otis Bethea, [address] [facility] 8/11/17    Health Maintenance reviewed. Upcoming Appts  Dr. Marina Hsieh - 10/2017  Dr. Sathya Brandon, GI -  10/2017 for a colonoscopy.

## 2017-08-22 ENCOUNTER — TELEPHONE (OUTPATIENT)
Dept: FAMILY MEDICINE CLINIC | Facility: CLINIC | Age: 82
End: 2017-08-22

## 2017-08-22 NOTE — PROGRESS NOTES
HgbA1c was stable. Glucose was borderline low. Please check glucose regularly and follow regular meals.

## 2017-10-30 RX ORDER — CETIRIZINE HCL 10 MG
10 TABLET ORAL DAILY
Qty: 90 TAB | Refills: 3 | Status: SHIPPED | OUTPATIENT
Start: 2017-10-30 | End: 2018-02-21

## 2017-10-30 RX ORDER — HYDROCHLOROTHIAZIDE 12.5 MG/1
12.5 TABLET ORAL DAILY
Qty: 90 TAB | Refills: 3 | Status: SHIPPED | OUTPATIENT
Start: 2017-10-30 | End: 2019-01-03 | Stop reason: SDUPTHER

## 2017-11-24 ENCOUNTER — OFFICE VISIT (OUTPATIENT)
Dept: FAMILY MEDICINE CLINIC | Facility: CLINIC | Age: 82
End: 2017-11-24

## 2017-11-24 VITALS
SYSTOLIC BLOOD PRESSURE: 134 MMHG | RESPIRATION RATE: 16 BRPM | WEIGHT: 188 LBS | HEIGHT: 67 IN | OXYGEN SATURATION: 98 % | TEMPERATURE: 97.8 F | DIASTOLIC BLOOD PRESSURE: 84 MMHG | HEART RATE: 80 BPM | BODY MASS INDEX: 29.51 KG/M2

## 2017-11-24 DIAGNOSIS — Z00.00 MEDICARE ANNUAL WELLNESS VISIT, SUBSEQUENT: ICD-10-CM

## 2017-11-24 DIAGNOSIS — Z13.31 SCREENING FOR DEPRESSION: ICD-10-CM

## 2017-11-24 DIAGNOSIS — M19.041 PRIMARY OSTEOARTHRITIS OF BOTH HANDS: ICD-10-CM

## 2017-11-24 DIAGNOSIS — I10 ESSENTIAL HYPERTENSION: ICD-10-CM

## 2017-11-24 DIAGNOSIS — E78.5 HYPERLIPIDEMIA, UNSPECIFIED HYPERLIPIDEMIA TYPE: ICD-10-CM

## 2017-11-24 DIAGNOSIS — E11.9 TYPE 2 DIABETES MELLITUS WITHOUT COMPLICATION, WITHOUT LONG-TERM CURRENT USE OF INSULIN (HCC): ICD-10-CM

## 2017-11-24 DIAGNOSIS — E11.9 TYPE 2 DIABETES MELLITUS WITHOUT COMPLICATION, WITHOUT LONG-TERM CURRENT USE OF INSULIN (HCC): Primary | ICD-10-CM

## 2017-11-24 DIAGNOSIS — M19.042 PRIMARY OSTEOARTHRITIS OF BOTH HANDS: ICD-10-CM

## 2017-11-24 DIAGNOSIS — R10.11 RIGHT UPPER QUADRANT ABDOMINAL PAIN: ICD-10-CM

## 2017-11-24 RX ORDER — METFORMIN HYDROCHLORIDE 500 MG/1
1000 TABLET ORAL 2 TIMES DAILY WITH MEALS
Qty: 180 TAB | Refills: 3 | Status: SHIPPED | OUTPATIENT
Start: 2017-11-24 | End: 2017-11-28 | Stop reason: SDUPTHER

## 2017-11-24 RX ORDER — RANITIDINE 150 MG/1
150 TABLET, FILM COATED ORAL 2 TIMES DAILY
Qty: 60 TAB | Refills: 2 | Status: SHIPPED | OUTPATIENT
Start: 2017-11-24 | End: 2018-02-21

## 2017-11-24 NOTE — PROGRESS NOTES
Girma Mabry is a 81 y.o. female and presents for annual Medicare Wellness Visit. Problem List: Reviewed with patient and discussed risk factors. Patient Active Problem List   Diagnosis Code    Hypertension I10    Diabetes mellitus ([address] E11.9    Encounter to establish care Z76.89    Tennis elbow M77.10    Trigger finger M65.30    Tendonitis of elbow, right M77.8    Hyperlipemia E78.5    Hot flashes, menopausal N95.1    Vertigo R42    Chronic allergic rhinitis J30.9       Current medical providers:  Patient Care Team:  Ale Campbell MD as PCP - General (Family Practice)  Babak Pettit MD (Cardiology)  John Valdovinos MD (Ophthalmology)  Kimberly Moseley MD (Dermatology)    PMH: Reviewed with patient  Past Medical History:   Diagnosis Date    Anemia     Chronic allergic rhinitis     Diabetes ([address]     Glaucoma     Hypercholesterolemia     Hypertension     Iron deficiency     Multiple thyroid nodules     neg (FNA)    BRIAN (obstructive sleep apnea)     Sleep apnea     Thyroid disease     lumps on thyroid    Trigger finger        PSH: Reviewed with patient  Past Surgical History:   Procedure Laterality Date    HX BUNIONECTOMY  1995    HX CATARACT REMOVAL  2012    correction    HX HERNIA REPAIR  2002    HX HYSTERECTOMY          SH: Reviewed with patient  Social History   Substance Use Topics    Smoking status: Former Smoker     Packs/day: 0.25     Quit date: 1/1/1970    Smokeless tobacco: Never Used      Comment: Quit smoking 1990    Alcohol use No       FH: Reviewed with patient  History reviewed. No pertinent family history. Medications/Allergies: Reviewed with patient  Current Outpatient Prescriptions on File Prior to Visit   Medication Sig Dispense Refill    cetirizine (ZYRTEC) 10 mg tablet Take 1 Tab by mouth daily. 90 Tab 3    hydroCHLOROthiazide (HYDRODIURIL) 12.5 mg tablet Take 1 Tab by mouth daily.  90 Tab 3    atorvastatin (LIPITOR) 10 mg tablet Take 1 Tab by mouth daily. 90 Tab 3    losartan (COZAAR) 100 mg tablet Take 1 Tab by mouth daily. 90 Tab 1    VOLTAREN 1 % gel       meclizine (ANTIVERT) 25 mg tablet Take 1 Tab by mouth three (3) times daily as needed. 30 Tab 0    amLODIPine (NORVASC) 2.5 mg tablet Take 1 Tab by mouth daily. 90 Tab 3    triamcinolone acetonide (KENALOG) 0.1 % topical cream Use as directed.  metFORMIN (GLUCOPHAGE) 500 mg tablet Take 2 Tabs by mouth two (2) times daily (with meals). Indications: type 2 diabetes mellitus (Patient taking differently: Take 500 mg by mouth two (2) times daily (with meals). Indications: type 2 diabetes mellitus) 180 Tab 3    glucose blood VI test strips (FREESTYLE LITE STRIPS) strip Use as directed to check blood sugar once a day 100 Strip 3    fluticasone (FLONASE) 50 mcg/actuation nasal spray 2 Sprays by Both Nostrils route two (2) times a day. 1 Bottle 3    Blood-Glucose Meter (FREESTYLE LITE METER) monitoring kit Use as directed to check blood sugar once a day 1 Kit 0    Lancets (FREESTYLE LANCETS) misc Use as directed to check blood sugar once a day. 100 Each 3    latanoprost (XALATAN) 0.005 % ophthalmic solution Administer 1 Drop to both eyes nightly. No current facility-administered medications on file prior to visit. Allergies   Allergen Reactions    Latex, Natural Rubber Other (comments)    Adhesive Contact Dermatitis     LEAVES DA ON SKIN    USE PAPER TAPE       Objective:  Visit Vitals    /84 (BP 1 Location: Left arm, BP Patient Position: Sitting)    Pulse 80    Temp 97.8 °F (36.6 °C) (Oral)    Resp 16    Ht 5' 7\" (1.702 m)    Wt 188 lb (85.3 kg)    SpO2 98%    BMI 29.44 kg/m2    Body mass index is 29.44 kg/(m^2). Assessment of cognitive impairment:   Alert and oriented x 3  Oriented to situation? yes  Memory Problem? no  Cognition Problem?  no    Depression Screen:   PHQ over the last two weeks 11/24/2017   Little interest or pleasure in doing things Not at all   Feeling down, depressed or hopeless Not at all   Total Score PHQ 2 0       Abuse Screen:   Abuse Screening Questionnaire 11/24/2017   Do you ever feel afraid of your partner? N   Are you in a relationship with someone who physically or mentally threatens you? N   Is it safe for you to go home? Y       Fall Risk Assessment:    Fall Risk Assessment, last 12 mths 11/24/2017   Able to walk? Yes   Fall in past 12 months? No       Functional Ability:   Does the patient exhibit a steady gait? yes   How long did it take the patient to get up and walk from a sitting position? 1 sec   Is the patient self reliant?  (ie can do own laundry, meals, household chores)  yes     Does the patient handle his/her own medications? yes     Does the patient handle his/her own money? yes     Is the patients home safe (ie good lighting, handrails on stairs and bath, etc.)? yes     Did you notice or did patient express any hearing difficulties? no     Did you notice of did patient express any vision difficulties?   no     Were distance and reading eye charts used? yes       Advance Care Planning:   Patient was offered the opportunity to discuss advance care planning:  yes     Does patient have an Advance Directive:  yes   If no, did you provide information on Caring Connections?   yes       Plan:      Orders Placed This Encounter    US ABD LTD    MICROALBUMIN, UR, RAND W/ MICROALBUMIN/CREA RATIO    LIPID PANEL    HEMOGLOBIN A1C WITH EAG    METABOLIC PANEL, COMPREHENSIVE    REFERRAL TO SURGERY     DIABETES FOOT EXAM    [place] DEPRESSION SCREEN ANNUAL    metFORMIN (GLUCOPHAGE) 500 mg tablet    raNITIdine (ZANTAC) 150 mg tablet       Health Maintenance   Topic Date Due    DTaP/Tdap/Td series (1 - Tdap) 10/10/1957    ZOSTER VACCINE AGE 60>  08/10/1996    Pneumococcal 65+ Low/Medium Risk (2 of 2 - PPSV23) 03/19/2016    MICROALBUMIN Q1  08/01/2017    LIPID PANEL Q1  08/01/2017    Influenza Age 5 to Adult  08/01/2017    FOOT EXAM Q1  11/18/2017    MEDICARE YEARLY EXAM  11/19/2017    HEMOGLOBIN A1C Q6M  02/21/2018    EYE EXAM RETINAL OR DILATED Q1  11/02/2018    GLAUCOMA SCREENING Q2Y  11/02/2019    OSTEOPOROSIS SCREENING (DEXA)  Completed     She had her immunizations. She will bring records. Microalbumin and lipids ordered. *Patient verbalized understanding and agreement with the plan. A copy of the After Visit Summary with personalized health plan was given to the patient today.

## 2017-11-24 NOTE — PROGRESS NOTES
SUBJECTIVE     Chief Complaint   Patient presents with    Hypertension    Diabetes    Annual Wellness Visit       HPI:     She no longer has dysbalance / dizziness. She has seen ENT. She has been using Flonase and taking Zyrtec. Her BP is running good at home with current Rx. Her glucose is good with life style and Glucophage. No hypoglycemia or symptoms. Her lipids are being treated. She is following diet for lipids and DM. She has been having tenderness in RUQ of abdomen for several weeks, which is not related to food. She has occasional ERA (regurg.) symptoms. No recent injury. No N/V/D/ anorexia. No fever/ chills/ malaise. No other Systemic, Cardiovascular or Respiratory symptoms. PMH: Hypertension 2006, DM II 2005, Hyperlipidemia, BRIAN, Chronic Allergic Rhinitis, Iron and Folate Deficiency Anemia, Glaucoma, Thyroid Nodules (neg FNA), Seborrheic Karatosis, Cataract Surgery 2012, Hysterectomy 1993 (Fibroids), Bunionectomy 1995, Herniorrhapy 2002, Colonic Polyps (due 2017). Current Outpatient Prescriptions   Medication Sig    cetirizine (ZYRTEC) 10 mg tablet Take 1 Tab by mouth daily.  hydroCHLOROthiazide (HYDRODIURIL) 12.5 mg tablet Take 1 Tab by mouth daily.  atorvastatin (LIPITOR) 10 mg tablet Take 1 Tab by mouth daily.  losartan (COZAAR) 100 mg tablet Take 1 Tab by mouth daily.  VOLTAREN 1 % gel     meclizine (ANTIVERT) 25 mg tablet Take 1 Tab by mouth three (3) times daily as needed.  amLODIPine (NORVASC) 2.5 mg tablet Take 1 Tab by mouth daily.  triamcinolone acetonide (KENALOG) 0.1 % topical cream Use as directed.  metFORMIN (GLUCOPHAGE) 500 mg tablet Take 2 Tabs by mouth two (2) times daily (with meals). Indications: type 2 diabetes mellitus (Patient taking differently: Take 500 mg by mouth two (2) times daily (with meals).  Indications: type 2 diabetes mellitus)    glucose blood VI test strips (FREESTYLE LITE STRIPS) strip Use as directed to check blood sugar once a day    fluticasone (FLONASE) 50 mcg/actuation nasal spray 2 Sprays by Both Nostrils route two (2) times a day.  Blood-Glucose Meter (FREESTYLE LITE METER) monitoring kit Use as directed to check blood sugar once a day    Lancets (FREESTYLE LANCETS) misc Use as directed to check blood sugar once a day.  latanoprost (XALATAN) 0.005 % ophthalmic solution Administer 1 Drop to both eyes nightly. No current facility-administered medications for this visit. Allergies: No known allergy to drugs. ROS:   Constitutional: No fever, chills, night sweats, malaise. Ear/Nose/Throat: No ear/ throat pain, lesions, unusual discharge, new speaking or hearing problems, nose bleed. Cardiovascular: No angina, palpitations, PND, orthopnea, lightheadedness, edema, claudication. Respiratory: No dyspnea, wheeze, pleurisy, hemoptysis, unusual cough or sputum. Gastrointestinal: No nausea/ vomiting, bowel habit change, pain, ERA symptoms, melena, hematochezia, anorexia. Psychiatric: No agitation, confusion/disorientation, suicidal or homicidal ideation. Musculoskeletal: No focal weakness. Musculoskeletal: no other complaints. OBJECTIVE   Constitutional: General Appearance: well developed, overweight, nontoxic, in no acute distress. Visit Vitals    /84 (BP 1 Location: Left arm, BP Patient Position: Sitting)    Pulse 80    Temp 97.8 °F (36.6 °C) (Oral)    Resp 16    Ht 5' 7\" (1.702 m)    Wt 188 lb (85.3 kg)    SpO2 98%    BMI 29.44 kg/m2     ENT: Otoscopic Examination: external auditory canals are clear; tympanic membranes are dull. Nasal Cavity: mildly swollen mucosa & turbinates. Maxillas are not tender w/o redness or heat. Throat: clear tonsils, oropharynx, posterior pharynx. Pulmonary: Respiratory effort: normal; no dyspnea, no retractions, no accessory muscle use.  Auscultation: normal & symmetrical air exchange; no rales, no rhonchi, no wheeze; no rubs Cardiovascular: Palpation: PMI not displaced or enlarged, no thrills or heaves. Auscultation: RRR; no murmur, rubs or gallops. Extremities: no edema, no active varicosity. GI[de-identified] Normal bowel sounds. No masses; upper RUQ tenderness lateral to xyphoid; no rebound/rigidity; no CVA tenderness. Bladder: no fullnesses, tenderness or palpable masses. Psychiatric[de-identified] Oriented to time, place and person. Normal mood, no agitation or anxiety. Normal affect. Pleasant and cooperative. Neurological[de-identified] CN I to XII: intact. DTR: symmetrical & normal.    Musculoskeletal[de-identified]   NC/AT,neck is supple. 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 .    Diabetic foot exam:     Left: Reflexes 2+     Vibratory sensation normal    Proprioception normal   Sharp/dull discrimination normal    Filament test normal sensation with micro filament   Pulse DP: 2+ (normal)   Pulse PT: 2+ (normal)   Deformities: None; S/P bunionectomy  Right: Reflexes 2+   Vibratory sensation normal   Proprioception normal   Sharp/dull discrimination normal   Filament test normal sensation with micro filament   Pulse DP: 2+ (normal)   Pulse PT: 2+ (normal)   Deformities: None; S/P bunionectomy    Lab Results   Component Value Date/Time    WBC 5.3 08/21/2017 02:08 PM    HGB 12.8 08/21/2017 02:08 PM    HCT 37.2 08/21/2017 02:08 PM    PLATELET 021 84/06/1704 02:08 PM    MCV 77.8 08/21/2017 02:08 PM     Lab Results   Component Value Date/Time    Sodium 141 08/21/2017 02:08 PM    Potassium 3.8 08/21/2017 02:08 PM    Chloride 104 08/21/2017 02:08 PM    CO2 30 08/21/2017 02:08 PM    Anion gap 7 08/21/2017 02:08 PM    Glucose 71 08/21/2017 02:08 PM    BUN 11 08/21/2017 02:08 PM    Creatinine 0.73 08/21/2017 02:08 PM    BUN/Creatinine ratio 15 08/21/2017 02:08 PM    GFR est AA >60 08/21/2017 02:08 PM    GFR est non-AA >60 08/21/2017 02:08 PM    Calcium 9.2 08/21/2017 02:08 PM    Bilirubin, total 0.3 08/21/2017 02:08 PM    AST (SGOT) 18 08/21/2017 02:08 PM Alk. phosphatase 71 08/21/2017 02:08 PM    Protein, total 7.1 08/21/2017 02:08 PM    Albumin 3.6 08/21/2017 02:08 PM    Globulin 3.5 08/21/2017 02:08 PM    A-G Ratio 1.0 08/21/2017 02:08 PM    ALT (SGPT) 24 08/21/2017 02:08 PM     Lab Results   Component Value Date/Time    Cholesterol, total 147 08/01/2016 10:14 AM    HDL Cholesterol 81 08/01/2016 10:14 AM    LDL, calculated 53 08/01/2016 10:14 AM    VLDL, calculated 14 08/01/2016 10:14 AM    Triglyceride 69 08/01/2016 10:14 AM     Lab Results   Component Value Date/Time    Hemoglobin A1c 6.6 08/21/2017 02:08 PM    Hemoglobin A1c (POC) 6.3 05/24/2017 11:59 AM        ASSESSMENT     1. Type 2 diabetes mellitus without complication, without long-term current use of insulin ([address]    2. Essential hypertension    3. Hyperlipidemia, unspecified hyperlipidemia type    4. Screening for depression    5. Right upper quadrant abdominal pain    6. Medicare annual wellness visit, subsequent        PLAN   Pharmacologic Management: Medications reviewed with the patient. Add Zantac 150 bid. Orders Placed This Encounter    US ABD LTD    MICROALBUMIN, UR, RAND W/ MICROALBUMIN/CREA RATIO    LIPID PANEL    HEMOGLOBIN A1C WITH EAG    METABOLIC PANEL, COMPREHENSIVE    REFERRAL TO SURGERY     DIABETES FOOT EXAM    [place] DEPRESSION SCREEN ANNUAL    metFORMIN (GLUCOPHAGE) 500 mg tablet    raNITIdine (ZANTAC) 150 mg tablet     Discussed DDx, follow-up & work-up. Discussed risk/benefit & side effect of treatment. Low Salt ADA diet & graduated excecise. Follow up as planned, prn sooner. Regular BS check at home and notify MD of results prn. Health risk from non adherence discussed. Patent voiced understanding. Follow-up Disposition:  Return in about 1 month (around 12/24/2017).     Guero [facility] MD

## 2017-11-24 NOTE — PROGRESS NOTES
Denita Enriquez is a 81 y.o.  female presents today for office visit for follow up. Pt is in Room # 4      1. Have you been to the ER, urgent care clinic since your last visit? Hospitalized since your last visit? No    2. Have you seen or consulted any other health care providers outside of the [address] [place] [address] since your last visit? Include any pap smears or colon screening.  No    Health Maintenance reviewed

## 2017-11-24 NOTE — PATIENT INSTRUCTIONS
Schedule of Personalized Health Plan for [facility]  (Provide Copy to Patient)   11/24/17      The best way to stay healthy is to live a healthy lifestyle. A healthy lifestyle includes regular exercise, eating a well-balanced diet, keeping a healthy weight and not smoking. Regular physical exams and screening tests are another important way to take care of yourself. Preventive exams provided by health care providers can find health problems early when treatment works best and can keep you from getting certain diseases or illnesses. Preventive services include exams, lab tests, screenings, shots, monitoring and information to help you take care of your own health. All people over 65 should have a pneumonia shot. Pneumonia shots are usually only needed once in a lifetime unless your doctor decides differently. All people over 65 should have a yearly flu shot. People over 65 are at medium to high risk for Hepatitis B. Three shots are needed for complete protection. In addition to your physical exam, some screening tests are recommended:    Bone mass measurement (dexa scan) is recommended every two years  Diabetes Mellitus screening is recommended every year. Glaucoma is an eye disease caused by high pressure in the eye. An eye exam is recommended every year. Cardiovascular screening tests that check your cholesterol and other blood fat (lipid) levels are recommended every five years. Colorectal Cancer screening tests help to find pre-cancerous polyps (growths in the colon) so they can be removed before they turn into cancer. Tests ordered for screening depend on your personal and family history risk factors.     Screening for Breast Cancer is recommended yearly with a mammogram.    Screening for Cervical Cancer is recommended every two years (annually for certain risk factors, such as previous history of STD or abnormal PAP in past 7 years), with a Pelvic Exam with PAP    Here is a list of your current Health Maintenance items with a due date:  Health Maintenance   Topic Date Due    DTaP/Tdap/Td series (1 - Tdap) 10/10/1956    ZOSTER VACCINE AGE 60>  08/10/1996    Pneumococcal 65+ Low/Medium Risk (2 of 2 - PPSV23) 03/19/2016    MICROALBUMIN Q1  08/01/2017    LIPID PANEL Q1  08/01/2017    Influenza Age 9 to Adult  08/01/2017    FOOT EXAM Q1  11/18/2017    MEDICARE YEARLY EXAM  11/19/2017    HEMOGLOBIN A1C Q6M  02/21/2018    EYE EXAM RETINAL OR DILATED Q1  11/02/2018    GLAUCOMA SCREENING Q2Y  11/02/2019    OSTEOPOROSIS SCREENING (DEXA)  Completed     Preventing Falls: Care Instructions  Your Care Instructions    Getting around your home safely can be a challenge if you have injuries or health problems that make it easy for you to fall. Loose rugs and furniture in walkways are among the dangers for many older people who have problems walking or who have poor eyesight. People who have conditions such as arthritis, osteoporosis, or dementia also have to be careful not to fall. You can make your home safer with a few simple measures. Follow-up care is a key part of your treatment and safety. Be sure to make and go to all appointments, and call your doctor if you are having problems. It's also a good idea to know your test results and keep a list of the medicines you take. How can you care for yourself at home? Taking care of yourself  · You may get dizzy if you do not drink enough water. To prevent dehydration, drink plenty of fluids, enough so that your urine is light yellow or clear like water. Choose water and other caffeine-free clear liquids. If you have kidney, heart, or liver disease and have to limit fluids, talk with your doctor before you increase the amount of fluids you drink. · Exercise regularly to improve your strength, muscle tone, and balance. Walk if you can. Swimming may be a good choice if you cannot walk easily.   · Have your vision and hearing checked each year or any time you notice a change. If you have trouble seeing and hearing, you might not be able to avoid objects and could lose your balance. · Know the side effects of the medicines you take. Ask your doctor or pharmacist whether the medicines you take can affect your balance. Sleeping pills or sedatives can affect your balance. · Limit the amount of alcohol you drink. Alcohol can impair your balance and other senses. · Ask your doctor whether calluses or corns on your feet need to be removed. If you wear loose-fitting shoes because of calluses or corns, you can lose your balance and fall. · Talk to your doctor if you have numbness in your feet. Preventing falls at home  · Remove raised doorway thresholds, throw rugs, and clutter. Repair loose carpet or raised areas in the floor. · Move furniture and electrical cords to keep them out of walking paths. · Use nonskid floor wax, and wipe up spills right away, especially on ceramic tile floors. · If you use a walker or cane, put rubber tips on it. If you use crutches, clean the bottoms of them regularly with an abrasive pad, such as steel wool. · Keep your house well lit, especially Dorothy [place], and outside walkways. Use night-lights in areas such as hallways and bathrooms. Add extra light switches or use remote switches (such as switches that go on or off when you clap your hands) to make it easier to turn lights on if you have to get up during the night. · Install sturdy handrails on stairways. · Move items in your cabinets so that the things you use a lot are on the lower shelves (about waist level). · Keep a cordless phone and a flashlight with new batteries by your bed. If possible, put a phone in each of the main rooms of your house, or carry a cell phone in case you fall and cannot reach a phone. Or, you can wear a device around your neck or wrist. You push a button that sends a signal for help.   · Wear low-heeled shoes that fit well and give your feet good support. Use footwear with nonskid soles. Check the heels and soles of your shoes for wear. Repair or replace worn heels or soles. · Do not wear socks without shoes on wood floors. · Walk on the grass when the sidewalks are slippery. If you live in an area that gets snow and ice in the winter, sprinkle salt on slippery steps and sidewalks. Preventing falls in the bath  · Install grab bars and nonskid mats inside and outside your shower or tub and near the toilet and sinks. · Use shower chairs and bath benches. · Use a hand-held shower head that will allow you to sit while showering. · Get into a tub or shower by putting the weaker leg in first. Get out of a tub or shower with your strong side first.  · Repair loose toilet seats and consider installing a raised toilet seat to make getting on and off the toilet easier. · Keep your bathroom door unlocked while you are in the shower. Where can you learn more? Go to http://[employer].info/. Enter 0476 [id number] in the search box to learn more about \"Preventing Falls: Care Instructions. \"  Current as of: May 12, 2017  Content Version: 11.4  © 7314-7673 [employer]. Care instructions adapted under license by [employer] (which disclaims liability or warranty for this information). If you have questions about a medical condition or this instruction, always ask your healthcare professional. [place] [address] any warranty or liability for your use of this information. Preventing Outdoor Falls: Care Instructions  Your Care Instructions    Worries about falls don't need to keep you indoors. Outdoor activities like walking have big benefits for your health. You will need to watch your step and learn a few safety measures. If you are worried about having a fall outdoors, ask your doctor about exercises, classes, or physical therapy that may help.  You can learn ways to gain strength, flexibility, and balance. Ask if it might help to use a cane or walker. You can make your time outdoors safer with a few simple measures. Follow-up care is a key part of your treatment and safety. Be sure to make and go to all appointments, and call your doctor if you are having problems. It's also a good idea to know your test results and keep a list of the medicines you take. How can you prevent falls outdoors? · Wear shoes with firm soles and low heels. If you have to walk on an icy surface, use grippers that can be worn over your shoes in bad weather. · Be extra careful if weather is bad. Walk on the grass when the sidewalks are slick. If you live in a place that gets snow and ice in the winter, sprinkle salt on slippery stairs and sidewalks. · Be careful getting on or off buses and trains or getting in and out of cars. If handrails are available, use them. · Be careful when you cross the street. Look for crosswalks or places where curb cuts or ramps are present. · Try not to hurry, especially if you are carrying something. · Be cautious in parking lots or garages. There may be curbs or changes in pavement, or the height of the pavement may vary. · Make sure to wear the correct eyeglasses, if you need them. Reading glasses or bifocals can make it harder to see hazards that might be in your way. · If you are walking outdoors for exercise, try to:  ¨ Walk in well-lighted, well-maintained areas. These include high school or college tracks, shopping malls, and public spaces. ¨ Walk with a partner. ¨ Watch out for cracked sidewalks, curbs, changes in the height of the pavement, exposed tree roots, and debris such as fallen leaves or branches. Where can you learn more? Go to http://yusra-maria l.info/. Enter P767 in the search box to learn more about \"Preventing Outdoor Falls: Care Instructions. \"  Current as of: May 12, 2017  Content Version: 11.4  © 2381-1554 Healthwise, [facility].  Care instructions adapted under license by [employer] (which disclaims liability or warranty for this information). If you have questions about a medical condition or this instruction, always ask your healthcare professional. [address] any warranty or liability for your use of this information. How to Get Up Safely After a Fall: Care Instructions  Your Care Instructions    If you have injuries, health problems, or other reasons that may make it easy for you to fall at home, it is a good idea to learn how to get up safely after a fall. Learning how to get up correctly can help you avoid making an injury worse. Also, knowing what to do if you cannot get up can help you stay safe until help arrives. Follow-up care is a key part of your treatment and safety. Be sure to make and go to all appointments, and call your doctor if you are having problems. It's also a good idea to know your test results and keep a list of the medicines you take. How can you care for yourself after a fall? If you think you can get up  First lie still for a few minutes and think about how you feel. If your body feels okay and you think you can get up safely, follow the rest of the steps below:  1. Look for a chair or other piece of furniture that is close to you. 2. Roll onto your side and rest. Roll by turning your head in the direction you want to roll, move your shoulder and arm, then hip and leg in the same direction. 3. Lie still for a moment to let your blood pressure adjust.  4. Slowly push your upper body up, lift your head, and take a moment to rest.  5. Slowly get up on your hands and knees, and crawl to the chair or other stable piece of furniture. 6. Put your hands on the chair. 7. Move one foot forward, and place it flat on the floor. Your other leg should be bent with the knee on the floor. 8. Rise slowly, turn your body, and sit in the chair.  Stay seated for a bit and think about how you feel. Call for help. Even if you feel okay, let someone know what happened to you. You might not know that you have a serious injury. If you cannot get up  1. If you think you are injured after a fall or you cannot get up, try not to panic. 2. Call out for help. 3. If you have a phone within reach or you have an emergency call device, use it to call for help. 4. If you do not have a phone within reach, try to slide yourself toward it. If you cannot get to the phone, try to slide toward a door or window or a place where you think you can be heard. 5. [place] or use an object to make noise so someone might hear you. 6. If you can reach something that you can use for a pillow, place it under your head. Try to stay warm by covering yourself with a blanket or clothing while you wait for help. When should you call for help? Call 911 anytime you think you may need emergency care. For example, call if:  ? · You passed out (lost consciousness). ? · You cannot get up after a fall. ? · You have severe pain. ?Call your doctor now or seek immediate medical care if:  ? · You have new or worse pain. ? · You are dizzy or lightheaded. ? · You hit your head. ? Watch closely for changes in your health, and be sure to contact your doctor if:  ? · You do not get better as expected. Where can you learn more? Go to http://yusra-maria l.info/. Enter Y171 in the search box to learn more about \"How to Get Up Safely After a Fall: Care Instructions. \"  Current as of: May 12, 2017  Content Version: 11.4  © 6305-4311 [employer]. Care instructions adapted under license by [employer] (which disclaims liability or warranty for this information). If you have questions about a medical condition or this instruction, always ask your healthcare professional. [address] any warranty or liability for your use of this information.        Advance Care Planning: Care Instructions  Your Care Instructions    It can be hard to live with an illness that cannot be cured. But if your health is getting worse, you may want to make decisions about end-of-life care. Planning for the end of your life does not mean that you are giving up. It is a way to make sure that your wishes are met. Clearly stating your wishes can make it easier for your loved ones. Making plans while you are still able may also ease your mind and make your final days less stressful and more meaningful. Follow-up care is a key part of your treatment and safety. Be sure to make and go to all appointments, and call your doctor if you are having problems. It's also a good idea to know your test results and keep a list of the medicines you take. What can you do to plan for the end of life? · You can bring these issues up with your doctor. You do not need to wait until your doctor starts the conversation. You might start with \"I would not be willing to live with . Gadiel Fernandez \" When you complete this sentence it helps your doctor understand your wishes. · Talk openly and honestly with your doctor. This is the best way to understand the decisions you will need to make as your health changes. Know that you can always change your mind. · Ask your doctor about commonly used life-support measures. These include tube feedings, breathing machines, and fluids given through a vein (IV). Understanding these treatments will help you decide whether you want them. · You may choose to have these life-supporting treatments for a limited time. This allows a trial period to see whether they will help you. You may also decide that you want your doctor to take only certain measures to keep you alive. It is important to spell out these conditions so that your doctor and family understand them. · Talk to your doctor about how long you are likely to live.  He or she may be able to give you an idea of what usually happens with your specific illness. · Think about preparing papers that state your wishes. This way there will not be any confusion about what you want. You can change your instructions at any time. Which papers should you prepare? Advance directives are legal papers that tell doctors how you want to be cared for at the end of your life. You do not need a  to write these papers. Ask your doctor or your state [facility] department for information on how to write your advance directives. They may have the forms for each of these types of papers. Make sure your doctor has a copy of these on file, and give a copy to a family member or close friend. · Consider a do-not-resuscitate order (DNR). This order asks that no extra treatments be done if your heart stops or you stop breathing. Extra treatments may include cardiopulmonary resuscitation (CPR), electrical shock to restart your heart, or a machine to breathe for you. If you decide to have a DNR order, ask your doctor to explain and write it. Place the order in your home where everyone can easily see it. · Consider a living will. A living will explains your wishes about life support and other treatments at the end of your life if you become unable to speak for yourself. Living reyes tell doctors to use or not use treatments that would keep you alive. You must have one or two witnesses or a notary present when you sign this form. · Consider a durable power of  for health care. This allows you to name a person to make decisions about your care if you are not able to. Most people ask a close friend or family member. Talk to this person about the kinds of treatments you want and those that you do not want. Make sure this person understands your wishes. These legal papers are simple to change. Tell your doctor what you want to change, and ask him or her to make a note in your medical file. Give your family updated copies of the papers. Where can you learn more?   Go to http://yusra-maria l.info/. Enter P184 in the search box to learn more about \"Advance Care Planning: Care Instructions. \"  Current as of: September 24, 2016  Content Version: 11.4  © 2463-1587 [employer]. Care instructions adapted under license by [employer] (which disclaims liability or warranty for this information). If you have questions about a medical condition or this instruction, always ask your healthcare professional. [place] [address] any warranty or liability for your use of this information. Learning About Durable Power of  for Health Care  What is a durable power of  for health care? A durable power of  for health care is one type of the legal forms called advance directives. It lets you decide who you want to make treatment decisions for you if you cannot speak or decide for yourself. The person you choose is called your health care agent. Another type of advance directive is a living will. It lets you write down what kinds of treatment or life support you want or do not want. What should you think about when choosing a health care agent? Choose your health care agent carefully. This person may or may not be a family member. Talk to the person before you make your final decision. Make sure he or she is comfortable with this responsibility. It's a good idea to choose someone who:  · Is at least 25years old. · Knows you well and understands what makes life meaningful for you. · Understands your [employer] and moral values. · Will do what you want, not what he or she wants. · Will be able to make difficult choices at a stressful time. · Will be able to refuse or stop treatment, if that is what you would want, even if you could die. · Will be firm and confident with health professionals if needed. · Will ask questions to get necessary information.   · Lives near you or agrees to travel to you if needed. Your family may help you make medical decisions while you can still be part of that process. But it is important to choose one person to be your health care agent in case you are not able to make decisions for yourself. If you don't fill out the legal form and name a health care agent, the decisions your family can make may be limited. Who will make decisions for you if you do not have a health care agent? If you don't have a health care agent or a living will, your family members may disagree about your medical care. And then some medical professionals who may not know you as well might have to make decisions for you. In some cases, a  makes the decisions. When you name a health care agent, it is very clear who has the power to make health decisions for you. How do you name a health care agent? You name your health care agent on a legal form. It is usually called a durable power of  for health care. Ask your hospital, state bar association, or office on aging where to find these forms. You must sign the form to make it legal. Some states require you to get the form notarized. This means that a person called a  watches you sign the form and then he or she signs the form. Some states also require that two or more witnesses sign the form. Be sure to tell your family members and doctors who your health care agent is. Keep your forms in a safe place. But make sure that your loved ones know where the forms are. This could be in your desk where you keep other important papers. Make sure your doctor has a copy of your forms. Where can you learn more? Go to http://yusra-maria l.info/. Enter [id number] in the search box to learn more about \"Learning About Durable Power of  for [facility]. \"  Current as of: September 24, 2016  Content Version: 11.4  © 0714-3248 Healthwise, [employer].  Care instructions adapted under license by [employer] (which disclaims liability or warranty for this information). If you have questions about a medical condition or this instruction, always ask your healthcare professional. [address] any warranty or liability for your use of this information. Deciding About Life-Prolonging Treatment  Deciding About Life-Prolonging Treatment  What is life-prolonging treatment? There are many kinds of treatment that can help you live longer. These may be needed for only a short time until your illness improves. Or you may use them over the long term to help keep you alive. Some treatments include the use of:  · Medicines to slow the progress of certain diseases, such as heart disease, diabetes, cancer, AIDS, or Alzheimer's disease. · Antibiotics to treat serious infections, such as pneumonia. · Dialysis to clean your blood if your kidneys stop working. · A breathing machine to help you breathe if you can't breathe on your own. This machine pumps air into your lungs through a tube put into your throat. · A feeding tube or an intravenous (IV) line to give you food and fluids if you can't eat or drink. · Cardiopulmonary resuscitation (CPR) to try to restart your heart. The decision to receive treatments that may help you live longer is a personal one. You may want your doctor to do everything possible to keep you alive, even when your chance for recovery is small. Or you may choose to only have care to manage your pain and other symptoms. What are key points about this decision? · If there is a good chance that your illness can be cured or managed, your doctor may advise you to first try available treatments. If these don't work, then you might think about stopping treatment. · If you stop treatment, you will still receive care that focuses on pain relief and comfort. · A decision to stop treatment that keeps you alive does not have to be permanent.  You can always change your mind if your health starts to improve. · Even though treatment focuses on helping you live longer, it may cause side effects that can greatly affect your quality of life. And it could affect how you spend time with your family and friends. · If you still have personal goals that you want to pursue, you may want treatment that keeps you alive long enough to reach them. Why might you choose life-prolonging treatment? · There is a good chance that your illness can be cured or managed. · You think you can manage the possible side effects of treatment. · You don't think treatment will get in the way of your quality of life. · You have personal goals that you still want to pursue and achieve. Why might you choose to stop life-prolonging treatment? · Your chance of surviving your illness is very low. · You have tried all possible treatments for your illness, but they have not helped. · You can no longer deal with the side effects of treatment. · You have already met the goals you set out to achieve in your life. Your decision  Thinking about the facts and your feelings can help you make a decision that is right for you. Be sure you understand the benefits and risks of your options, and think about what else you need to do before you make the decision. Where can you learn more? Go to http://yusra-maria l.info/. Enter S275 in the search box to learn more about \"Deciding About Life-Prolonging Treatment. \"  Current as of: September 24, 2016  Content Version: 11.4  © 9087-7768 [employer]. Care instructions adapted under license by [employer] (which disclaims liability or warranty for this information). If you have questions about a medical condition or this instruction, always ask your healthcare professional. [address] any warranty or liability for your use of this information. Learning About Living Daniel Links  What is a living will?     A living will is a legal form you use to write down the kind of care you want at the end of your life. It is used by the health professionals who will treat you if you aren't able to decide for yourself. If you put your wishes in writing, your loved ones and others will know what kind of care you want. They won't need to guess. This can ease your mind and be helpful to others. A living will is not the same as an estate or property will. An estate will explains what you want to happen with your money and property after you die. Is a living will a legal document? A living will is a legal document. Each state has its own laws about living reyes. If you move to another state, make sure that your living will is legal in the state where you now live. Or you might use a universal form that has been approved by many states. This kind of form can sometimes be completed and stored online. Your electronic copy will then be available wherever you have a connection to the Internet. In most cases, doctors will respect your wishes even if you have a form from a different state. · You don't need an  to complete a living will. But legal advice can be helpful if your state's laws are unclear, your health history is complicated, or your family can't agree on what should be in your living will. · You can change your living will at any time. Some people find that their wishes about end-of-life care change as their health changes. · In addition to making a living will, think about completing a medical power of  form. This form lets you name the person you want to make end-of-life treatment decisions for you (your \"health care agent\") if you're not able to. Many hospitals and nursing homes will give you the forms you need to complete a living will and a medical power of . · Your living will is used only if you can't make or communicate decisions for yourself anymore.  If you become able to make decisions again, you can accept or refuse any treatment, no matter what you wrote in your living will. · Your state may offer an online registry. This is a place where you can store your living will online so the doctors and nurses who need to treat you can find it right away. What should you think about when creating a living will? Talk about your end-of-life wishes with your family members and your doctor. Let them know what you want. That way the people making decisions for you won't be surprised by your choices. Think about these questions as you make your living will:  · Do you know enough about life support methods that might be used? If not, talk to your doctor so you know what might be done if you can't breathe on your own, your heart stops, or you're unable to swallow. · What things would you still want to be able to do after you receive life-support methods? Would you want to be able to walk? To speak? To eat on your own? To live without the help of machines? · If you have a choice, where do you want to be cared for? In your home? At a hospital or nursing home? · Do you want certain Christian practices performed if you become very ill? · If you have a choice at the end of your life, where would you prefer to die? At home? In a hospital or nursing home? Somewhere else? · Would you prefer to be buried or cremated? · Do you want your organs to be donated after you die? What should you do with your living will? · Make sure that your family members and your health care agent have copies of your living will. · Give your doctor a copy of your living will to keep in your medical record. If you have more than one doctor, make sure that each one has a copy. · You may want to put a copy of your living will where it can be easily found. Where can you learn more? Go to http://yusra-maria l.info/. Enter V535 in the search box to learn more about \"Learning About Living Lopezarlette Nadege. \"  Current as of: September 24, 2016  Content Version: 11.4  © 7398-5127 [employer]. Care instructions adapted under license by [employer] (which disclaims liability or warranty for this information). If you have questions about a medical condition or this instruction, always ask your healthcare professional. [address] any warranty or liability for your use of this information. Advance Directives: Care Instructions  Your Care Instructions  An advance directive is a legal way to state your wishes at the end of your life. It tells your family and your doctor what to do if you can no longer say what you want. There are two main types of advance directives. You can change them any time that your wishes change. · A living will tells your family and your doctor your wishes about life support and other treatment. · A durable power of  for health care lets you name a person to make treatment decisions for you when you can't speak for yourself. This person is called a health care agent. If you do not have an advance directive, decisions about your medical care may be made by a doctor or a  who doesn't know you. It may help to think of an advance directive as a gift to the people who care for you. If you have one, they won't have to make tough decisions by themselves. Follow-up care is a key part of your treatment and safety. Be sure to make and go to all appointments, and call your doctor if you are having problems. It's also a good idea to know your test results and keep a list of the medicines you take. How can you care for yourself at home? · Discuss your wishes with your loved ones and your doctor. This way, there are no surprises. · Many states have a unique form. Or you might use a universal form that has been approved by many states. This kind of form can sometimes be completed and stored online. Your electronic copy will then be available wherever you have a connection to the Internet.  In most cases, doctors will respect your wishes even if you have a form from a different state. · You don't need a  to do an advance directive. But you may want to get legal advice. · Think about these questions when you prepare an advance directive:  ¨ Who do you want to make decisions about your medical care if you are not able to? Many people choose a family member or close friend. ¨ Do you know enough about life support methods that might be used? If not, talk to your doctor so you understand. ¨ What are you most afraid of that might happen? You might be afraid of having pain, losing your independence, or being kept alive by machines. ¨ Where would you prefer to die? Choices include your home, a hospital, or a nursing home. ¨ Would you like to have information about hospice care to support you and your family? ¨ Do you want to donate organs when you die? ¨ Do you want certain Baptism practices performed before you die? If so, put your wishes in the advance directive. · Read your advance directive every year, and make changes as needed. When should you call for help? Be sure to contact your doctor if you have any questions. Where can you learn more? Go to http://yusra-maria l.info/. Enter R264 in the search box to learn more about \"Advance Directives: Care Instructions. \"  Current as of: September 24, 2016  Content Version: 11.4  © 8668-2161 [employer]. Care instructions adapted under license by [employer] (which disclaims liability or warranty for this information). If you have questions about a medical condition or this instruction, always ask your healthcare professional. [place] [address] any warranty or liability for your use of this information. Advance Directives: Care Instructions  Your Care Instructions  An advance directive is a legal way to state your wishes at the end of your life.  It tells your family and your doctor what to do if you can no longer say what you want. There are two main types of advance directives. You can change them any time that your wishes change. · A living will tells your family and your doctor your wishes about life support and other treatment. · A durable power of  for health care lets you name a person to make treatment decisions for you when you can't speak for yourself. This person is called a health care agent. If you do not have an advance directive, decisions about your medical care may be made by a doctor or a  who doesn't know you. It may help to think of an advance directive as a gift to the people who care for you. If you have one, they won't have to make tough decisions by themselves. Follow-up care is a key part of your treatment and safety. Be sure to make and go to all appointments, and call your doctor if you are having problems. It's also a good idea to know your test results and keep a list of the medicines you take. How can you care for yourself at home? · Discuss your wishes with your loved ones and your doctor. This way, there are no surprises. · Many states have a unique form. Or you might use a universal form that has been approved by many states. This kind of form can sometimes be completed and stored online. Your electronic copy will then be available wherever you have a connection to the Internet. In most cases, doctors will respect your wishes even if you have a form from a different state. · You don't need a  to do an advance directive. But you may want to get legal advice. · Think about these questions when you prepare an advance directive:  ¨ Who do you want to make decisions about your medical care if you are not able to? Many people choose a family member or close friend. ¨ Do you know enough about life support methods that might be used? If not, talk to your doctor so you understand. ¨ What are you most afraid of that might happen?  You might be afraid of having pain, losing your independence, or being kept alive by machines. ¨ Where would you prefer to die? Choices include your home, a hospital, or a nursing home. ¨ Would you like to have information about hospice care to support you and your family? ¨ Do you want to donate organs when you die? ¨ Do you want certain Amish practices performed before you die? If so, put your wishes in the advance directive. · Read your advance directive every year, and make changes as needed. When should you call for help? Be sure to contact your doctor if you have any questions. Where can you learn more? Go to http://yusra-maria l.info/. Enter R264 in the search box to learn more about \"Advance Directives: Care Instructions. \"  Current as of: September 24, 2016  Content Version: 11.4  © 9002-6706 Healthwise, Incorporated. Care instructions adapted under license by [employer] (which disclaims liability or warranty for this information). If you have questions about a medical condition or this instruction, always ask your healthcare professional. [address] any warranty or liability for your use of this information.

## 2017-11-24 NOTE — PROGRESS NOTES
Visual Acuity Screening    Right eye Left eye Both eyes   Without correction: 20/25 20/15 20/15   With correction:

## 2017-11-24 NOTE — MR AVS SNAPSHOT
Visit Information Date & Time Provider Department Dept. Phone Encounter #  
 11/24/2017 10:15 AM Naresh Muse MD [facility] [phone] [id number] Follow-up Instructions Return in about 1 month (around 12/24/2017). Your Appointments 12/26/2017 10:30 AM  
Follow Up with Naresh Muse MD  
[facility] ([address]) Appt Note: Return in about 1 month (around 12/24/2017). [address] [facility] 83 [id number]  
[address] [place] [address] Upcoming Health Maintenance Date Due DTaP/Tdap/Td series (1 - Tdap) [birth year]10/1956 ZOSTER VACCINE AGE 60> 8/10/1996 Pneumococcal 65+ Low/Medium Risk (2 of 2 - PPSV23) 3/19/2016 MICROALBUMIN Q1 8/1/2017 LIPID PANEL Q1 8/1/2017 Influenza Age 5 to Adult 8/1/2017 FOOT EXAM Q1 11/18/2017 MEDICARE YEARLY EXAM 11/19/2017 HEMOGLOBIN A1C Q6M 2/21/2018 EYE EXAM RETINAL OR DILATED Q1 11/2/2018 GLAUCOMA SCREENING Q2Y 11/2/2019 Allergies as of 11/24/2017  Review Complete On: 11/24/2017 By: Naresh Muse MD  
  
 Severity Noted Reaction Type Reactions Latex, Natural Rubber Medium 11/30/2015    Other (comments) Adhesive  06/08/2015    Contact Dermatitis LEAVES DA ON SKIN 
USE PAPER TAPE Current Immunizations  Reviewed on 6/8/2015 Name Date Pneumococcal Conjugate (PCV-13) 3/19/2015 Not reviewed this visit You Were Diagnosed With   
  
 Codes Comments Medicare annual wellness visit, subsequent    -  Primary ICD-10-CM: Z00.00 ICD-9-CM: V70.0 Type 2 diabetes mellitus without complication, without long-term current use of insulin (HCC)     ICD-10-CM: E11.9 ICD-9-CM: 250.00 Essential hypertension     ICD-10-CM: I10 
ICD-9-CM: 401.9 Hyperlipidemia, unspecified hyperlipidemia type     ICD-10-CM: E78.5 ICD-9-CM: 272.4 Screening for depression     ICD-10-CM: Z13.89 ICD-9-CM: V79.0 Right upper quadrant abdominal pain     ICD-10-CM: R10.11 ICD-9-CM: 789.01 Vitals BP Pulse Temp Resp Height(growth percentile) Weight(growth percentile) 134/84 (BP 1 Location: Left arm, BP Patient Position: Sitting) 80 97.8 °F (36.6 °C) (Oral) 16 5' 7\" (1.702 m) 188 lb (85.3 kg) SpO2 BMI OB Status Smoking Status 98% 29.44 kg/m2 Hysterectomy Former Smoker Vitals History BMI and BSA Data Body Mass Index Body Surface Area  
 29.44 kg/m 2 2.01 m 2 Preferred Pharmacy Pharmacy Name Phone [address] [facility] [phone] Your Updated Medication List  
  
   
This list is accurate as of: 11/24/17 11:25 AM.  Always use your most recent med list. amLODIPine 2.5 mg tablet Commonly known as:  Kristopher Chafe Take 1 Tab by mouth daily. atorvastatin 10 mg tablet Commonly known as:  LIPITOR Take 1 Tab by mouth daily. Blood-Glucose Meter monitoring kit Commonly known as:  FREESTYLE LITE METER Use as directed to check blood sugar once a day  
  
 cetirizine 10 mg tablet Commonly known as:  ZYRTEC Take 1 Tab by mouth daily. fluticasone 50 mcg/actuation nasal spray Commonly known as:  Joshua Antonella 2 Sprays by Both Nostrils route two (2) times a day. glucose blood VI test strips strip Commonly known as:  FREESTYLE LITE STRIPS Use as directed to check blood sugar once a day  
  
 hydroCHLOROthiazide 12.5 mg tablet Commonly known as:  HYDRODIURIL Take 1 Tab by mouth daily. Lancets Misc Commonly known as:  FREESTYLE LANCETS Use as directed to check blood sugar once a day. losartan 100 mg tablet Commonly known as:  COZAAR Take 1 Tab by mouth daily. meclizine 25 mg tablet Commonly known as:  ANTIVERT Take 1 Tab by mouth three (3) times daily as needed. metFORMIN 500 mg tablet Commonly known as:  GLUCOPHAGE Take 2 Tabs by mouth two (2) times daily (with meals). Indications: type 2 diabetes mellitus  
  
 raNITIdine 150 mg tablet Commonly known as:  ZANTAC Take 1 Tab by mouth two (2) times a day. triamcinolone acetonide 0.1 % topical cream  
Commonly known as:  KENALOG Use as directed. VOLTAREN 1 % Gel Generic drug:  diclofenac XALATAN 0.005 % ophthalmic solution Generic drug:  latanoprost  
Administer 1 Drop to both eyes nightly. Prescriptions Sent to Pharmacy Refills  
 metFORMIN (GLUCOPHAGE) 500 mg tablet 3 Sig: Take 2 Tabs by mouth two (2) times daily (with meals). Indications: type 2 diabetes mellitus Class: Normal  
 [facility] [place] Ph #: [phone] Route: Oral  
 raNITIdine (ZANTAC) 150 mg tablet 2 Sig: Take 1 Tab by mouth two (2) times a day. Class: Normal  
 [facility] [place] Ph #: [phone] Route: Oral  
  
We Performed the Following  DIABETES FOOT EXAM [HM7 Custom] [id number] [place] [address] [employer] [ [facility]] REFERRAL TO SURGERY [AZG861 Custom] Comments:  
 Please evaluate patient for abdominal pain Follow-up Instructions Return in about 1 month (around 12/24/2017). To-Do List   
 11/24/2017 Lab:  HEMOGLOBIN A1C WITH EAG   
  
 11/24/2017 Lab:  LIPID PANEL   
  
 11/24/2017 Lab:  METABOLIC PANEL, COMPREHENSIVE   
  
 11/24/2017 Lab:  MICROALBUMIN, UR, RAND W/ MICROALBUMIN/CREA RATIO   
  
 11/30/2017 Imaging:  US ABD LTD Referral Information Referral ID Referred By Referred To  
  
 [id number] [facility]  [facility] [address] Phone: [phone] Fax: [phone] Visits Status Start Date End Date 1 New Request 11/24/17 11/24/18 If your referral has a status of pending review or denied, additional information will be sent to support the outcome of this decision. Patient Instructions Preventing Falls: Care Instructions Your Care Instructions Getting around your home safely can be a challenge if you have injuries or health problems that make it easy for you to fall. Loose rugs and furniture in walkways are among the dangers for many older people who have problems walking or who have poor eyesight. People who have conditions such as arthritis, osteoporosis, or dementia also have to be careful not to fall. You can make your home safer with a few simple measures. Follow-up care is a key part of your treatment and safety. Be sure to make and go to all appointments, and call your doctor if you are having problems. It's also a good idea to know your test results and keep a list of the medicines you take. How can you care for yourself at home? Taking care of yourself · You may get dizzy if you do not drink enough water. To prevent dehydration, drink plenty of fluids, enough so that your urine is light yellow or clear like water. Choose water and other caffeine-free clear liquids. If you have kidney, heart, or liver disease and have to limit fluids, talk with your doctor before you increase the amount of fluids you drink. · Exercise regularly to improve your strength, muscle tone, and balance. Walk if you can. Swimming may be a good choice if you cannot walk easily. · Have your vision and hearing checked each year or any time you notice a change. If you have trouble seeing and hearing, you might not be able to avoid objects and could lose your balance. · Know the side effects of the medicines you take. Ask your doctor or pharmacist whether the medicines you take can affect your balance. Sleeping pills or sedatives can affect your balance. · Limit the amount of alcohol you drink. Alcohol can impair your balance and other senses. · Ask your doctor whether calluses or corns on your feet need to be removed. If you wear loose-fitting shoes because of calluses or corns, you can lose your balance and fall. · Talk to your doctor if you have numbness in your feet. Preventing falls at home · Remove raised doorway thresholds, throw rugs, and clutter. Repair loose carpet or raised areas in the floor. · Move furniture and electrical cords to keep them out of walking paths. · Use nonskid floor wax, and wipe up spills right away, especially on ceramic tile floors. · If you use a walker or cane, put rubber tips on it. If you use crutches, clean the bottoms of them regularly with an abrasive pad, such as steel wool. · Keep your house well lit, especially Robert Climes, and outside walkways. Use night-lights in areas such as hallways and bathrooms. Add extra light switches or use remote switches (such as switches that go on or off when you clap your hands) to make it easier to turn lights on if you have to get up during the night. · Install sturdy handrails on stairways. · Move items in your cabinets so that the things you use a lot are on the lower shelves (about waist level). · Keep a cordless phone and a flashlight with new batteries by your bed. If possible, put a phone in each of the main rooms of your house, or carry a cell phone in case you fall and cannot reach a phone. Or, you can wear a device around your neck or wrist. You push a button that sends a signal for help. · Wear low-heeled shoes that fit well and give your feet good support. Use footwear with nonskid soles. Check the heels and soles of your shoes for wear. Repair or replace worn heels or soles. · Do not wear socks without shoes on wood floors. · Walk on the grass when the sidewalks are slippery.  If you live in an area that gets snow and ice in the winter, sprinkle salt on slippery steps and sidewalks. Preventing falls in the bath · Install grab bars and nonskid mats inside and outside your shower or tub and near the toilet and sinks. · Use shower chairs and bath benches. · Use a hand-held shower head that will allow you to sit while showering. · Get into a tub or shower by putting the weaker leg in first. Get out of a tub or shower with your strong side first. 
· Repair loose toilet seats and consider installing a raised toilet seat to make getting on and off the toilet easier. · Keep your bathroom door unlocked while you are in the shower. Where can you learn more? Go to http://yusra-maria l.info/. Enter [id number] in the search box to learn more about \"Preventing Falls: Care Instructions. \" Current as of: May 12, 2017 Content Version: 11.4 © 6616-7063 [employer]. Care instructions adapted under license by [employer] (which disclaims liability or warranty for this information). If you have questions about a medical condition or this instruction, always ask your healthcare professional. [place] [address] any warranty or liability for your use of this information. Preventing Outdoor Falls: Care Instructions Your Care Instructions Worries about falls don't need to keep you indoors. Outdoor activities like walking have big benefits for your health. You will need to watch your step and learn a few safety measures. If you are worried about having a fall outdoors, ask your doctor about exercises, classes, or physical therapy that may help. You can learn ways to gain strength, flexibility, and balance. Ask if it might help to use a cane or walker. You can make your time outdoors safer with a few simple measures. Follow-up care is a key part of your treatment and safety.  Be sure to make and go to all appointments, and call your doctor if you are having problems. It's also a good idea to know your test results and keep a list of the medicines you take. How can you prevent falls outdoors? · Wear shoes with firm soles and low heels. If you have to walk on an icy surface, use grippers that can be worn over your shoes in bad weather. · Be extra careful if weather is bad. Walk on the grass when the sidewalks are slick. If you live in a place that gets snow and ice in the winter, sprinkle salt on slippery stairs and sidewalks. · Be careful getting on or off buses and trains or getting in and out of cars. If handrails are available, use them. · Be careful when you cross the street. Look for crosswalks or places where curb cuts or ramps are present. · Try not to hurry, especially if you are carrying something. · Be cautious in parking lots or garages. There may be curbs or changes in pavement, or the height of the pavement may vary. · Make sure to wear the correct eyeglasses, if you need them. Reading glasses or bifocals can make it harder to see hazards that might be in your way. · If you are walking outdoors for exercise, try to: 
¨ Walk in well-lighted, well-maintained areas. These include high school or college tracks, shopping malls, and public spaces. ¨ Walk with a partner. ¨ Watch out for cracked sidewalks, curbs, changes in the height of the pavement, exposed tree roots, and debris such as fallen leaves or branches. Where can you learn more? Go to http://yusra-maria l.info/. Enter K519 in the search box to learn more about \"Preventing Outdoor Falls: Care Instructions. \" Current as of: May 12, 2017 Content Version: 11.4 © 7207-2731 [employer]zuna. Care instructions adapted under license by [employer] (which disclaims liability or warranty for this information).  If you have questions about a medical condition or this instruction, always ask your healthcare professional. [employer] disclaims any warranty or liability for your use of this information. How to Get Up Safely After a Fall: Care Instructions Your Care Instructions If you have injuries, health problems, or other reasons that may make it easy for you to fall at home, it is a good idea to learn how to get up safely after a fall. Learning how to get up correctly can help you avoid making an injury worse. Also, knowing what to do if you cannot get up can help you stay safe until help arrives. Follow-up care is a key part of your treatment and safety. Be sure to make and go to all appointments, and call your doctor if you are having problems. It's also a good idea to know your test results and keep a list of the medicines you take. How can you care for yourself after a fall? If you think you can get up First lie still for a few minutes and think about how you feel. If your body feels okay and you think you can get up safely, follow the rest of the steps below: 1. Look for a chair or other piece of furniture that is close to you. 2. Roll onto your side and rest. Roll by turning your head in the direction you want to roll, move your shoulder and arm, then hip and leg in the same direction. 3. Lie still for a moment to let your blood pressure adjust. 
4. Slowly push your upper body up, lift your head, and take a moment to rest. 
5. Slowly get up on your hands and knees, and crawl to the chair or other stable piece of furniture. 6. Put your hands on the chair. 7. Move one foot forward, and place it flat on the floor. Your other leg should be bent with the knee on the floor. 8. Rise slowly, turn your body, and sit in the chair. Stay seated for a bit and think about how you feel. Call for help. Even if you feel okay, let someone know what happened to you. You might not know that you have a serious injury. If you cannot get up 1.  If you think you are injured after a fall or you cannot get up, try not to panic. 2. Call out for help. 3. If you have a phone within reach or you have an emergency call device, use it to call for help. 4. If you do not have a phone within reach, try to slide yourself toward it. If you cannot get to the phone, try to slide toward a door or window or a place where you think you can be heard. 5. Todd or use an object to make noise so someone might hear you. 6. If you can reach something that you can use for a pillow, place it under your head. Try to stay warm by covering yourself with a blanket or clothing while you wait for help. When should you call for help? Call 911 anytime you think you may need emergency care. For example, call if: 
? · You passed out (lost consciousness). ? · You cannot get up after a fall. ? · You have severe pain. ?Call your doctor now or seek immediate medical care if: 
? · You have new or worse pain. ? · You are dizzy or lightheaded. ? · You hit your head. ? Watch closely for changes in your health, and be sure to contact your doctor if: 
? · You do not get better as expected. Where can you learn more? Go to http://yusra-maria l.info/. Enter E632 in the search box to learn more about \"How to Get Up Safely After a Fall: Care Instructions. \" Current as of: May 12, 2017 Content Version: 11.4 © 8006-8751 [employer]umental. Care instructions adapted under license by [employer] (which disclaims liability or warranty for this information). If you have questions about a medical condition or this instruction, always ask your healthcare professional. [place] [address] any warranty or liability for your use of this information. Advance Care Planning: Care Instructions Your Care Instructions It can be hard to live with an illness that cannot be cured.  But if your health is getting worse, you may want to make decisions about end-of-life care. Planning for the end of your life does not mean that you are giving up. It is a way to make sure that your wishes are met. Clearly stating your wishes can make it easier for your loved ones. Making plans while you are still able may also ease your mind and make your final days less stressful and more meaningful. Follow-up care is a key part of your treatment and safety. Be sure to make and go to all appointments, and call your doctor if you are having problems. It's also a good idea to know your test results and keep a list of the medicines you take. What can you do to plan for the end of life? · You can bring these issues up with your doctor. You do not need to wait until your doctor starts the conversation. You might start with \"I would not be willing to live with . Mary Kay Tobar \" When you complete this sentence it helps your doctor understand your wishes. · Talk openly and honestly with your doctor. This is the best way to understand the decisions you will need to make as your health changes. Know that you can always change your mind. · Ask your doctor about commonly used life-support measures. These include tube feedings, breathing machines, and fluids given through a vein (IV). Understanding these treatments will help you decide whether you want them. · You may choose to have these life-supporting treatments for a limited time. This allows a trial period to see whether they will help you. You may also decide that you want your doctor to take only certain measures to keep you alive. It is important to spell out these conditions so that your doctor and family understand them. · Talk to your doctor about how long you are likely to live. He or she may be able to give you an idea of what usually happens with your specific illness. · Think about preparing papers that state your wishes. This way there will not be any confusion about what you want. You can change your instructions at any time. Which papers should you prepare? Advance directives are legal papers that tell doctors how you want to be cared for at the end of your life. You do not need a  to write these papers. Ask your doctor or your state health department for information on how to write your advance directives. They may have the forms for each of these types of papers. Make sure your doctor has a copy of these on file, and give a copy to a family member or close friend. · Consider a do-not-resuscitate order (DNR). This order asks that no extra treatments be done if your heart stops or you stop breathing. Extra treatments may include cardiopulmonary resuscitation (CPR), electrical shock to restart your heart, or a machine to breathe for you. If you decide to have a DNR order, ask your doctor to explain and write it. Place the order in your home where everyone can easily see it. · Consider a living will. A living will explains your wishes about life support and other treatments at the end of your life if you become unable to speak for yourself. Living reyes tell doctors to use or not use treatments that would keep you alive. You must have one or two witnesses or a notary present when you sign this form. · Consider a durable power of  for health care. This allows you to name a person to make decisions about your care if you are not able to. Most people ask a close friend or family member. Talk to this person about the kinds of treatments you want and those that you do not want. Make sure this person understands your wishes. These legal papers are simple to change. Tell your doctor what you want to change, and ask him or her to make a note in your medical file. Give your family updated copies of the papers. Where can you learn more? Go to http://yusra-maria l.info/. Enter P184 in the search box to learn more about \"Advance Care Planning: Care Instructions. \" Current as of: September 24, 2016 Content Version: 11.4 © 0003-0021 [employer]. Care instructions adapted under license by [employer] (which disclaims liability or warranty for this information). If you have questions about a medical condition or this instruction, always ask your healthcare professional. [address] any warranty or liability for your use of this information. Learning About Durable Power of  for Health Care What is a durable power of  for health care? A durable power of  for health care is one type of the legal forms called advance directives. It lets you decide who you want to make treatment decisions for you if you cannot speak or decide for yourself. The person you choose is called your health care agent. Another type of advance directive is a living will. It lets you write down what kinds of treatment or life support you want or do not want. What should you think about when choosing a health care agent? Choose your health care agent carefully. This person may or may not be a family member. Talk to the person before you make your final decision. Make sure he or she is comfortable with this responsibility. It's a good idea to choose someone who: · Is at least 25years old. · Knows you well and understands what makes life meaningful for you. · Understands your Rastafarian and moral values. · Will do what you want, not what he or she wants. · Will be able to make difficult choices at a stressful time. · Will be able to refuse or stop treatment, if that is what you would want, even if you could die. · Will be firm and confident with health professionals if needed. · Will ask questions to get necessary information. · Lives near you or agrees to travel to you if needed. Your family may help you make medical decisions while you can still be part of that process.  But it is important to choose one person to be your health care agent in case you are not able to make decisions for yourself. If you don't fill out the legal form and name a health care agent, the decisions your family can make may be limited. Who will make decisions for you if you do not have a health care agent? If you don't have a health care agent or a living will, your family members may disagree about your medical care. And then some medical professionals who may not know you as well might have to make decisions for you. In some cases, a  makes the decisions. When you name a health care agent, it is very clear who has the power to make health decisions for you. How do you name a health care agent? You name your health care agent on a legal form. It is usually called a durable power of  for health care. Ask your hospital, state bar association, or office on aging where to find these forms. You must sign the form to make it legal. Some states require you to get the form notarized. This means that a person called a  watches you sign the form and then he or she signs the form. Some states also require that two or more witnesses sign the form. Be sure to tell your family members and doctors who your health care agent is. Keep your forms in a safe place. But make sure that your loved ones know where the forms are. This could be in your desk where you keep other important papers. Make sure your doctor has a copy of your forms. Where can you learn more? Go to http://yusra-maria l.info/. Enter [id number] in the search box to learn more about \"Learning About Durable Power of  for [facility]. \" Current as of: September 24, 2016 Content Version: 11.4 © 5319-2512 [employer]wise, [employer]. Care instructions adapted under license by [employer] (which disclaims liability or warranty for this information).  If you have questions about a medical condition or this instruction, always ask your healthcare professional. [address] any warranty or liability for your use of this information. Deciding About Life-Prolonging Treatment Deciding About Life-Prolonging Treatment What is life-prolonging treatment? There are many kinds of treatment that can help you live longer. These may be needed for only a short time until your illness improves. Or you may use them over the long term to help keep you alive. Some treatments include the use of: · Medicines to slow the progress of certain diseases, such as heart disease, diabetes, cancer, AIDS, or Alzheimer's disease. · Antibiotics to treat serious infections, such as pneumonia. · Dialysis to clean your blood if your kidneys stop working. · A breathing machine to help you breathe if you can't breathe on your own. This machine pumps air into your lungs through a tube put into your throat. · A feeding tube or an intravenous (IV) line to give you food and fluids if you can't eat or drink. · Cardiopulmonary resuscitation (CPR) to try to restart your heart. The decision to receive treatments that may help you live longer is a personal one. You may want your doctor to do everything possible to keep you alive, even when your chance for recovery is small. Or you may choose to only have care to manage your pain and other symptoms. What are key points about this decision? · If there is a good chance that your illness can be cured or managed, your doctor may advise you to first try available treatments. If these don't work, then you might think about stopping treatment. · If you stop treatment, you will still receive care that focuses on pain relief and comfort. · A decision to stop treatment that keeps you alive does not have to be permanent. You can always change your mind if your health starts to improve.  
· Even though treatment focuses on helping you live longer, it may cause side effects that can greatly affect your quality of life. And it could affect how you spend time with your family and friends. · If you still have personal goals that you want to pursue, you may want treatment that keeps you alive long enough to reach them. Why might you choose life-prolonging treatment? · There is a good chance that your illness can be cured or managed. · You think you can manage the possible side effects of treatment. · You don't think treatment will get in the way of your quality of life. · You have personal goals that you still want to pursue and achieve. Why might you choose to stop life-prolonging treatment? · Your chance of surviving your illness is very low. · You have tried all possible treatments for your illness, but they have not helped. · You can no longer deal with the side effects of treatment. · You have already met the goals you set out to achieve in your life. Your decision Thinking about the facts and your feelings can help you make a decision that is right for you. Be sure you understand the benefits and risks of your options, and think about what else you need to do before you make the decision. Where can you learn more? Go to http://yusra-maria l.info/. Enter A707 in the search box to learn more about \"Deciding About Life-Prolonging Treatment. \" Current as of: September 24, 2016 Content Version: 11.4 © 7019-9149 [employer]. Care instructions adapted under license by [employer] (which disclaims liability or warranty for this information). If you have questions about a medical condition or this instruction, always ask your healthcare professional. [place] [address] any warranty or liability for your use of this information. Vickey Bennett 27[address] What is a living will?  
 
A living will is a legal form you use to write down the kind of care you want at the end of your life. It is used by the health professionals who will treat you if you aren't able to decide for yourself. If you put your wishes in writing, your loved ones and others will know what kind of care you want. They won't need to guess. This can ease your mind and be helpful to others. A living will is not the same as an estate or property will. An estate will explains what you want to happen with your money and property after you die. Is a living will a legal document? A living will is a legal document. Each state has its own laws about living reyes. If you move to another state, make sure that your living will is legal in the state where you now live. Or you might use a universal form that has been approved by many states. This kind of form can sometimes be completed and stored online. Your electronic copy will then be available wherever you have a connection to the Internet. In most cases, doctors will respect your wishes even if you have a form from a different state. · You don't need an  to complete a living will. But legal advice can be helpful if your state's laws are unclear, your health history is complicated, or your family can't agree on what should be in your living will. · You can change your living will at any time. Some people find that their wishes about end-of-life care change as their health changes. · In addition to making a living will, think about completing a medical power of  form. This form lets you name the person you want to make end-of-life treatment decisions for you (your \"health care agent\") if you're not able to. Many hospitals and nursing homes will give you the forms you need to complete a living will and a medical power of . · Your living will is used only if you can't make or communicate decisions for yourself anymore.  If you become able to make decisions again, you can accept or refuse any treatment, no matter what you wrote in your living will. · Your state may offer an online registry. This is a place where you can store your living will online so the doctors and nurses who need to treat you can find it right away. What should you think about when creating a living will? Talk about your end-of-life wishes with your family members and your doctor. Let them know what you want. That way the people making decisions for you won't be surprised by your choices. Think about these questions as you make your living will: · Do you know enough about life support methods that might be used? If not, talk to your doctor so you know what might be done if you can't breathe on your own, your heart stops, or you're unable to swallow. · What things would you still want to be able to do after you receive life-support methods? Would you want to be able to walk? To speak? To eat on your own? To live without the help of machines? · If you have a choice, where do you want to be cared for? In your home? At a hospital or nursing home? · Do you want certain Pentecostal practices performed if you become very ill? · If you have a choice at the end of your life, where would you prefer to die? At home? In a hospital or nursing home? Somewhere else? · Would you prefer to be buried or cremated? · Do you want your organs to be donated after you die? What should you do with your living will? · Make sure that your family members and your health care agent have copies of your living will. · Give your doctor a copy of your living will to keep in your medical record. If you have more than one doctor, make sure that each one has a copy. · You may want to put a copy of your living will where it can be easily found. Where can you learn more? Go to http://yusra-maria l.info/. Enter U125 in the search box to learn more about \"Learning About Living [place]. \" 
 Current as of: September 24, 2016 Content Version: 11.4 © 4812-4535 [employer]. Care instructions adapted under license by [employer] (which disclaims liability or warranty for this information). If you have questions about a medical condition or this instruction, always ask your healthcare professional. [facility] [address] any warranty or liability for your use of this information. Advance Directives: Care Instructions Your Care Instructions An advance directive is a legal way to state your wishes at the end of your life. It tells your family and your doctor what to do if you can no longer say what you want. There are two main types of advance directives. You can change them any time that your wishes change. · A living will tells your family and your doctor your wishes about life support and other treatment. · A durable power of  for health care lets you name a person to make treatment decisions for you when you can't speak for yourself. This person is called a health care agent. If you do not have an advance directive, decisions about your medical care may be made by a doctor or a  who doesn't know you. It may help to think of an advance directive as a gift to the people who care for you. If you have one, they won't have to make tough decisions by themselves. Follow-up care is a key part of your treatment and safety. Be sure to make and go to all appointments, and call your doctor if you are having problems. It's also a good idea to know your test results and keep a list of the medicines you take. How can you care for yourself at home? · Discuss your wishes with your loved ones and your doctor. This way, there are no surprises. · Many states have a unique form. Or you might use a universal form that has been approved by many states. This kind of form can sometimes be completed and stored online.  Your electronic copy will then be available wherever you have a connection to the Internet. In most cases, doctors will respect your wishes even if you have a form from a different state. · You don't need a  to do an advance directive. But you may want to get legal advice. · Think about these questions when you prepare an advance directive: ¨ Who do you want to make decisions about your medical care if you are not able to? Many people choose a family member or close friend. ¨ Do you know enough about life support methods that might be used? If not, talk to your doctor so you understand. ¨ What are you most afraid of that might happen? You might be afraid of having pain, losing your independence, or being kept alive by machines. ¨ Where would you prefer to die? Choices include your home, a hospital, or a nursing home. ¨ Would you like to have information about hospice care to support you and your family? ¨ Do you want to donate organs when you die? ¨ Do you want certain Pentecostalism practices performed before you die? If so, put your wishes in the advance directive. · Read your advance directive every year, and make changes as needed. When should you call for help? Be sure to contact your doctor if you have any questions. Where can you learn more? Go to http://yusra-maria l.info/. Enter R264 in the search box to learn more about \"Advance Directives: Care Instructions. \" Current as of: September 24, 2016 Content Version: 11.4 © 5084-5912 [employer]. Care instructions adapted under license by [employer] (which disclaims liability or warranty for this information). If you have questions about a medical condition or this instruction, always ask your healthcare professional. [address] any warranty or liability for your use of this information. Advance Directives: Care Instructions Your Care Instructions An advance directive is a legal way to state your wishes at the end of your life. It tells your family and your doctor what to do if you can no longer say what you want. There are two main types of advance directives. You can change them any time that your wishes change. · A living will tells your family and your doctor your wishes about life support and other treatment. · A durable power of  for health care lets you name a person to make treatment decisions for you when you can't speak for yourself. This person is called a health care agent. If you do not have an advance directive, decisions about your medical care may be made by a doctor or a  who doesn't know you. It may help to think of an advance directive as a gift to the people who care for you. If you have one, they won't have to make tough decisions by themselves. Follow-up care is a key part of your treatment and safety. Be sure to make and go to all appointments, and call your doctor if you are having problems. It's also a good idea to know your test results and keep a list of the medicines you take. How can you care for yourself at home? · Discuss your wishes with your loved ones and your doctor. This way, there are no surprises. · Many states have a unique form. Or you might use a universal form that has been approved by many states. This kind of form can sometimes be completed and stored online. Your electronic copy will then be available wherever you have a connection to the Internet. In most cases, doctors will respect your wishes even if you have a form from a different state. · You don't need a  to do an advance directive. But you may want to get legal advice. · Think about these questions when you prepare an advance directive: ¨ Who do you want to make decisions about your medical care if you are not able to? Many people choose a family member or close friend. ¨ Do you know enough about life support methods that might be used? If not, talk to your doctor so you understand. ¨ What are you most afraid of that might happen? You might be afraid of having pain, losing your independence, or being kept alive by machines. ¨ Where would you prefer to die? Choices include your home, a hospital, or a nursing home. ¨ Would you like to have information about hospice care to support you and your family? ¨ Do you want to donate organs when you die? ¨ Do you want certain Protestant practices performed before you die? If so, put your wishes in the advance directive. · Read your advance directive every year, and make changes as needed. When should you call for help? Be sure to contact your doctor if you have any questions. Where can you learn more? Go to http://yusra-maria l.info/. Enter R264 in the search box to learn more about \"Advance Directives: Care Instructions. \" Current as of: September 24, 2016 Content Version: 11.4 © 1694-3969 [employer]. Care instructions adapted under license by [employer] (which disclaims liability or warranty for this information). If you have questions about a medical condition or this instruction, always ask your healthcare professional. [place] [address] any warranty or liability for your use of this information. Introducing [facility]! Dear Grisel Crews: 
Thank you for requesting a Zeuss account. Our records indicate that you already have an active Zeuss account. You can access your account anytime at [email or web address] Did you know that you can access your hospital and ER discharge instructions at any time in Zeuss? You can also review all of your test results from your hospital stay or ER visit. Additional Information If you have questions, please visit the Frequently Asked Questions section of the [employer]u website at [email or web address] Remember, Kukunu is NOT to be used for urgent needs. For medical emergencies, dial 911. Now available from your iPhone and Android! Please provide this summary of care documentation to your next provider. Your primary care clinician is listed as [address] [place] [address]. If you have any questions after today's visit, please call [phone].

## 2017-11-27 LAB
A-G RATIO,AGRAT: 1.1 RATIO (ref 1.1–2.6)
ALBUMIN SERPL-MCNC: 4 G/DL (ref 3.5–5)
ALP SERPL-CCNC: 66 U/L (ref 40–120)
ALT SERPL-CCNC: 14 U/L (ref 5–40)
ANION GAP SERPL CALC-SCNC: 11.2 MMOL/L
AST SERPL W P-5'-P-CCNC: 22 U/L (ref 10–37)
AVG GLU, 10930: 129 MG/DL (ref 91–123)
BILIRUB SERPL-MCNC: 0.6 MG/DL (ref 0.2–1.2)
BUN SERPL-MCNC: 16 MG/DL (ref 6–22)
CALCIUM SERPL-MCNC: 9.6 MG/DL (ref 8.4–10.5)
CHLORIDE SERPL-SCNC: 100 MMOL/L (ref 98–110)
CHOLEST SERPL-MCNC: 147 MG/DL (ref 110–200)
CO2 SERPL-SCNC: 28 MMOL/L (ref 20–32)
CREAT SERPL-MCNC: 0.7 MG/DL (ref 0.8–1.4)
CREATININE, URINE: 34 MG/DL
GFRAA, 66117: >60
GFRNA, 66118: >60
GLOBULIN,GLOB: 3.6 G/DL (ref 2–4)
GLUCOSE SERPL-MCNC: 101 MG/DL (ref 70–99)
HBA1C MFR BLD HPLC: 6.1 % (ref 4.8–5.9)
HDLC SERPL-MCNC: 92 MG/DL (ref 40–59)
LDLC SERPL CALC-MCNC: 45 MG/DL (ref 50–99)
MICROALB/CREAT RATIO, 140286: NORMAL MCG/MG OF CREATININE (ref 0–30)
MICROALBUMIN,URINE RANDOM 140054: <12 UG/ML (ref 0.1–17)
POTASSIUM SERPL-SCNC: 4.3 MMOL/L (ref 3.5–5.5)
PROT SERPL-MCNC: 7.6 G/DL (ref 6.2–8.1)
SODIUM SERPL-SCNC: 139 MMOL/L (ref 133–145)
TRIGL SERPL-MCNC: 47 MG/DL (ref 40–149)
VLDLC SERPL CALC-MCNC: 9 MG/DL (ref 8–30)

## 2017-11-28 ENCOUNTER — TELEPHONE (OUTPATIENT)
Dept: FAMILY MEDICINE CLINIC | Facility: CLINIC | Age: 82
End: 2017-11-28

## 2017-11-28 DIAGNOSIS — E11.9 TYPE 2 DIABETES MELLITUS WITHOUT COMPLICATION, WITHOUT LONG-TERM CURRENT USE OF INSULIN (HCC): ICD-10-CM

## 2017-11-28 RX ORDER — METFORMIN HYDROCHLORIDE 500 MG/1
500 TABLET ORAL 2 TIMES DAILY WITH MEALS
Qty: 180 TAB | Refills: 3
Start: 2017-11-28 | End: 2018-08-07 | Stop reason: SDUPTHER

## 2017-11-28 NOTE — PROGRESS NOTES
HgbA1c was only 6.1. Other BW was good. Decrease metformin to one 500 mg tab twice daily.   Will follow

## 2017-11-28 NOTE — TELEPHONE ENCOUNTER
Spoke with pt in regards to lab results. Two pt identifier's and permission to release verified. Relayed 's notes. Pt acknowledges understanding and pt is agreeable to decreasing metformin. Pt voices no concerns at this time.

## 2017-12-14 ENCOUNTER — TELEPHONE (OUTPATIENT)
Dept: FAMILY MEDICINE CLINIC | Facility: CLINIC | Age: 82
End: 2017-12-14

## 2017-12-14 NOTE — TELEPHONE ENCOUNTER
Returned call to home number. Verified name and . Spoke with patient. Per patient she is calling on behalf of he spouse Valeriokristin Natasha. Per spouse Dr. Dorinda Contreras will not write for Allopurinol and suggested Kidney provider to order. Per patient spouse Kidney provider does not do uric acid or write Allopurinol. Patient spouse would provider to order blood work and then d/c medication if not needed. Spouse had no further questions or concerns.

## 2018-02-12 DIAGNOSIS — I10 ESSENTIAL HYPERTENSION: ICD-10-CM

## 2018-02-12 RX ORDER — LOSARTAN POTASSIUM 100 MG/1
100 TABLET ORAL DAILY
Qty: 90 TAB | Refills: 1 | Status: SHIPPED | OUTPATIENT
Start: 2018-02-12 | End: 2018-08-07 | Stop reason: SDUPTHER

## 2018-02-12 RX ORDER — AMLODIPINE BESYLATE 2.5 MG/1
2.5 TABLET ORAL DAILY
Qty: 90 TAB | Refills: 3 | Status: SHIPPED | OUTPATIENT
Start: 2018-02-12 | End: 2019-03-04 | Stop reason: SDUPTHER

## 2018-02-12 NOTE — TELEPHONE ENCOUNTER
Pt calling to request medication refill of:  Requested Prescriptions     Pending Prescriptions Disp Refills    glucose blood VI test strips (FREESTYLE LITE STRIPS) strip 100 Strip 3     Sig: Use as directed to check blood sugar once a day    losartan (COZAAR) 100 mg tablet 90 Tab 1     Sig: Take 1 Tab by mouth daily.  amLODIPine (NORVASC) 2.5 mg tablet 90 Tab 3     Sig: Take 1 Tab by mouth daily. be sent to [address] [place] [address]  Pt has about 7 tabs remaining. next appt sched for 2/21  Advised pt of 72 hour time frame for refill requests. Please advise.

## 2018-02-21 ENCOUNTER — OFFICE VISIT (OUTPATIENT)
Dept: FAMILY MEDICINE CLINIC | Age: 83
End: 2018-02-21

## 2018-02-21 VITALS
DIASTOLIC BLOOD PRESSURE: 76 MMHG | RESPIRATION RATE: 18 BRPM | HEART RATE: 88 BPM | HEIGHT: 67 IN | BODY MASS INDEX: 28.63 KG/M2 | WEIGHT: 182.4 LBS | SYSTOLIC BLOOD PRESSURE: 132 MMHG | TEMPERATURE: 96.6 F | OXYGEN SATURATION: 99 %

## 2018-02-21 DIAGNOSIS — E78.5 HYPERLIPIDEMIA, UNSPECIFIED HYPERLIPIDEMIA TYPE: ICD-10-CM

## 2018-02-21 DIAGNOSIS — E11.9 TYPE 2 DIABETES MELLITUS WITHOUT COMPLICATION, WITHOUT LONG-TERM CURRENT USE OF INSULIN (HCC): Primary | ICD-10-CM

## 2018-02-21 DIAGNOSIS — I10 ESSENTIAL HYPERTENSION: ICD-10-CM

## 2018-02-21 LAB — HBA1C MFR BLD HPLC: 6.9 %

## 2018-02-21 NOTE — PROGRESS NOTES
Rudy Bunn is a 81 y.o. female presents in office for 1 month f/u. Health Maintenance Due   Topic Date Due    Pneumococcal 65+ Low/Medium Risk (2 of 2 - PPSV23) 03/19/2016       1. Have you been to the ER, urgent care clinic since your last visit? Hospitalized since your last visit? Yes. Hernia Repair Surgery    2. Have you seen or consulted any other health care providers outside of the [address] [place] [address] since your last visit? Include any pap smears or colon screening.  Yes, Cardiology Dr. Rosey Pabon

## 2018-02-21 NOTE — PROGRESS NOTES
SUBJECTIVE     Chief Complaint   Patient presents with    Diabetes     f/u    Hypertension     f/u    Cholesterol Problem     f/u       HPI:     She no longer has dysbalance / dizziness. She has seen ENT. She has stopped Flonase and Zyrtec. Her BP is running good at home with current Rx. Her glucose is good with life style and Glucophage. No hypoglycemia or symptoms. She had ventral hernia surgery about 6 weeks ago. Her lipids are being treated. She is following diet for lipids and DM. No other Systemic, Cardiovascular or Respiratory symptoms. PMH: Hypertension 2006, DM II 2005, Hyperlipidemia, BRIAN, Chronic Allergic Rhinitis, Iron and Folate Deficiency Anemia, Glaucoma, Thyroid Nodules (neg FNA), Seborrheic Karatosis, Cataract Surgery 2012, Hysterectomy 1993 (Fibroids), Bunionectomy 1995, Herniorrhapy 2002, Colonic Polyps (due 2017). Current Outpatient Prescriptions   Medication Sig    glucose blood VI test strips (FREESTYLE LITE STRIPS) strip Use as directed to check blood sugar once a day    losartan (COZAAR) 100 mg tablet Take 1 Tab by mouth daily.  amLODIPine (NORVASC) 2.5 mg tablet Take 1 Tab by mouth daily.  metFORMIN (GLUCOPHAGE) 500 mg tablet Take 1 Tab by mouth two (2) times daily (with meals). Indications: type 2 diabetes mellitus    hydroCHLOROthiazide (HYDRODIURIL) 12.5 mg tablet Take 1 Tab by mouth daily. (Patient taking differently: Take 12.5 mg by mouth every other day.)    atorvastatin (LIPITOR) 10 mg tablet Take 1 Tab by mouth daily.  VOLTAREN 1 % gel     triamcinolone acetonide (KENALOG) 0.1 % topical cream Use as directed.  Blood-Glucose Meter (FREESTYLE LITE METER) monitoring kit Use as directed to check blood sugar once a day    Lancets (FREESTYLE LANCETS) misc Use as directed to check blood sugar once a day.  latanoprost (XALATAN) 0.005 % ophthalmic solution Administer 1 Drop to both eyes nightly.     meclizine (ANTIVERT) 25 mg tablet Take 1 Tab by mouth three (3) times daily as needed. No current facility-administered medications for this visit. Allergies: No known allergy to drugs. ROS:   Constitutional: No fever, chills, night sweats, malaise. Ear/Nose/Throat: No ear/ throat pain, lesions, unusual discharge, new speaking or hearing problems, nose bleed. Cardiovascular: No angina, palpitations, PND, orthopnea, lightheadedness, edema, claudication. Respiratory: No dyspnea, wheeze, pleurisy, hemoptysis, unusual cough or sputum. Gastrointestinal: No nausea/ vomiting, bowel habit change, pain, ERA symptoms, melena, hematochezia, anorexia. Psychiatric: No agitation, confusion/disorientation, suicidal or homicidal ideation. Musculoskeletal: No focal weakness. Musculoskeletal: no other complaints. OBJECTIVE   Constitutional: General Appearance: well developed, overweight, nontoxic, in no acute distress. Visit Vitals    /76 (BP 1 Location: Right arm, BP Patient Position: Sitting)    Pulse 88    Temp 96.6 °F (35.9 °C) (Oral)    Resp 18    Ht 5' 7\" (1.702 m)    Wt 182 lb 6.4 oz (82.7 kg)    SpO2 99%    BMI 28.57 kg/m2     Pulmonary: Respiratory effort: normal; no dyspnea, no retractions, no accessory muscle use. Auscultation: normal & symmetrical air exchange; no rales, no rhonchi, no wheeze; no rubs     Cardiovascular: Palpation: PMI not displaced or enlarged, no thrills or heaves. Auscultation: RRR; no murmur, rubs or gallops. Extremities: no edema, no active varicosity. GI[de-identified] Normal bowel sounds. No masses; upper RUQ tenderness lateral to xyphoid; no rebound/rigidity; no CVA tenderness. Psychiatric[de-identified] Oriented to time, place and person. Normal mood, no agitation or anxiety. Normal affect. Pleasant and cooperative. Neurological[de-identified] CN I to XII: intact. DTR: symmetrical & normal.    Musculoskeletal[de-identified]   NC/AT,neck is supple. 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 .      Lab Results   Component Value Date/Time    WBC 5.3 08/21/2017 02:08 PM    HGB 12.8 08/21/2017 02:08 PM    HCT 37.2 08/21/2017 02:08 PM    PLATELET 298 50/98/1999 02:08 PM    MCV 77.8 08/21/2017 02:08 PM     Lab Results   Component Value Date/Time    Sodium 139 11/27/2017 10:25 AM    Potassium 4.3 11/27/2017 10:25 AM    Chloride 100 11/27/2017 10:25 AM    CO2 28 11/27/2017 10:25 AM    Anion gap 11.2 11/27/2017 10:25 AM    Glucose 101 (H) 11/27/2017 10:25 AM    BUN 16 11/27/2017 10:25 AM    Creatinine 0.7 (L) 11/27/2017 10:25 AM    BUN/Creatinine ratio 15 08/21/2017 02:08 PM    GFR est AA >60 08/21/2017 02:08 PM    GFR est non-AA >60 08/21/2017 02:08 PM    Calcium 9.6 11/27/2017 10:25 AM    Bilirubin, total 0.6 11/27/2017 10:25 AM    AST (SGOT) 22 11/27/2017 10:25 AM    Alk. phosphatase 66 11/27/2017 10:25 AM    Protein, total 7.6 11/27/2017 10:25 AM    Albumin 4.0 11/27/2017 10:25 AM    Globulin 3.6 11/27/2017 10:25 AM    A-G Ratio 1.1 11/27/2017 10:25 AM    ALT (SGPT) 14 11/27/2017 10:25 AM     Lab Results   Component Value Date/Time    Cholesterol, total 147 11/27/2017 10:25 AM    HDL Cholesterol 92 (H) 11/27/2017 10:25 AM    LDL, calculated 45 (L) 11/27/2017 10:25 AM    VLDL, calculated 9 11/27/2017 10:25 AM    Triglyceride 47 11/27/2017 10:25 AM     Lab Results   Component Value Date/Time    Hemoglobin A1c 6.1 (H) 11/27/2017 10:25 AM    Hemoglobin A1c (POC) 6.9 02/21/2018 12:10 PM        ASSESSMENT     1. Type 2 diabetes mellitus without complication, without long-term current use of insulin ([address]    2. Essential hypertension    3. Hyperlipidemia, unspecified hyperlipidemia type        PLAN   Pharmacologic Management: Medications reviewed with the patient. No change. Orders Placed This Encounter    AMB POC HEMOGLOBIN A1C     Discussed DDx, follow-up & work-up. Discussed risk/benefit & side effect of treatment. Low Salt ADA diet & graduated excecise. Follow up as planned, prn sooner.  Regular BS check at home and notify MD of results prn. Health risk from non adherence discussed. Patent voiced understanding. Follow-up Disposition:  Return in about 3 months (around 5/21/2018).     Tameka Pineda MD

## 2018-02-21 NOTE — MR AVS SNAPSHOT
[facility] 
 
 
 [address] [facility] [address] [place] [address] 
[phone] Patient: Meena Rasmussen MRN: [id number] :1935 Visit Information Date & Time Provider Department Dept. Phone Encounter #  
 2018 10:30 AM Sanket Alejandre MD [facility] [phone] [id number] Follow-up Instructions Return in about 3 months (around 2018). Your Appointments 2018  1:30 PM  
Follow Up with Sanket Alejandre MD  
[facility] Appt Note: 3 months f/u  
 [address] [facility] [address] [place] [id number]  
[phone]  
  
   
 [address] [place] [address] Upcoming Health Maintenance Date Due Pneumococcal 65+ Low/Medium Risk (2 of 2 - PPSV23) 3/19/2016 HEMOGLOBIN A1C Q6M 2018 EYE EXAM RETINAL OR DILATED Q1 2018 FOOT EXAM Q1 2018 MEDICARE YEARLY EXAM 2018 MICROALBUMIN Q1 2018 LIPID PANEL Q1 2018 GLAUCOMA SCREENING Q2Y 2019 DTaP/Tdap/Td series (2 - Td) 2024 Allergies as of 2018  Review Complete On: 2018 By: Sanket Alejandre MD  
  
 Severity Noted Reaction Type Reactions Latex, Natural Rubber Medium 2015    Other (comments) Adhesive  2015    Contact Dermatitis LEAVES DA ON SKIN 
USE PAPER TAPE Current Immunizations  Reviewed on 2018 Name Date Influenza High Dose Vaccine PF 2017 Pneumococcal Conjugate (PCV-13) 3/19/2015 Tdap 2014 Zoster Vaccine, Live 2014 Reviewed by Justina Smart LPN on  at 51:12 AM  
You Were Diagnosed With   
  
 Codes Comments Type 2 diabetes mellitus without complication, without long-term current use of insulin (HCC)    -  Primary ICD-10-CM: E11.9 ICD-9-CM: 250.00 Essential hypertension     ICD-10-CM: I10 
ICD-9-CM: 401.9 Hyperlipidemia, unspecified hyperlipidemia type     ICD-10-CM: E78.5 ICD-9-CM: 272.4 Vitals BP Pulse Temp Resp Height(growth percentile) Weight(growth percentile) 132/76 (BP 1 Location: Right arm, BP Patient Position: Sitting) 88 96.6 °F (35.9 °C) (Oral) 18 5' 7\" (1.702 m) 182 lb 6.4 oz (82.7 kg) SpO2 BMI OB Status Smoking Status 99% 28.57 kg/m2 Hysterectomy Former Smoker Vitals History BMI and BSA Data Body Mass Index Body Surface Area 28.57 kg/m 2 1.98 m 2 Preferred Pharmacy Pharmacy Name Phone [address], Mehrdad Kathleen [facility] [phone] Your Updated Medication List  
  
   
This list is accurate as of 2/21/18 12:14 PM.  Always use your most recent med list. amLODIPine 2.5 mg tablet Commonly known as:  Veronica Garrison Take 1 Tab by mouth daily. atorvastatin 10 mg tablet Commonly known as:  LIPITOR Take 1 Tab by mouth daily. Blood-Glucose Meter monitoring kit Commonly known as:  FREESTYLE LITE METER Use as directed to check blood sugar once a day  
  
 glucose blood VI test strips strip Commonly known as:  FREESTYLE LITE STRIPS Use as directed to check blood sugar once a day  
  
 hydroCHLOROthiazide 12.5 mg tablet Commonly known as:  HYDRODIURIL Take 1 Tab by mouth daily. Lancets Misc Commonly known as:  FREESTYLE LANCETS Use as directed to check blood sugar once a day. losartan 100 mg tablet Commonly known as:  COZAAR Take 1 Tab by mouth daily. meclizine 25 mg tablet Commonly known as:  ANTIVERT Take 1 Tab by mouth three (3) times daily as needed. metFORMIN 500 mg tablet Commonly known as:  GLUCOPHAGE Take 1 Tab by mouth two (2) times daily (with meals).  Indications: type 2 diabetes mellitus  
  
 triamcinolone acetonide 0.1 % topical cream  
 Commonly known as:  KENALOG Use as directed. VOLTAREN 1 % Gel Generic drug:  diclofenac XALATAN 0.005 % ophthalmic solution Generic drug:  latanoprost  
Administer 1 Drop to both eyes nightly. We Performed the Following AMB POC HEMOGLOBIN A1C [96858 CPT(R)] Follow-up Instructions Return in about 3 months (around 5/21/2018). Introducing [facility] & HEALTH SERVICES! Dear Karla Wynn: 
Thank you for requesting a InboxQ account. Our records indicate that you already have an active InboxQ account. You can access your account anytime at [email or web address] [employer] Did you know that you can access your hospital and ER discharge instructions at any time in InboxQ? You can also review all of your test results from your hospital stay or ER visit. Additional Information If you have questions, please visit the Frequently Asked Questions section of the InboxQ website at [email or web address] Remember, InboxQ is NOT to be used for urgent needs. For medical emergencies, dial 911. Now available from your iPhone and Android! Please provide this summary of care documentation to your next provider. Your primary care clinician is listed as [address] [place] [address]. If you have any questions after today's visit, please call [phone].

## 2018-05-31 ENCOUNTER — OFFICE VISIT (OUTPATIENT)
Dept: FAMILY MEDICINE CLINIC | Age: 83
End: 2018-05-31

## 2018-05-31 ENCOUNTER — HOSPITAL ENCOUNTER (OUTPATIENT)
Dept: LAB | Age: 83
Discharge: HOME | End: 2018-05-31
Payer: MEDICARE

## 2018-05-31 VITALS
OXYGEN SATURATION: 97 % | DIASTOLIC BLOOD PRESSURE: 70 MMHG | RESPIRATION RATE: 20 BRPM | BODY MASS INDEX: 27.72 KG/M2 | SYSTOLIC BLOOD PRESSURE: 134 MMHG | TEMPERATURE: 97.3 F | HEART RATE: 80 BPM | HEIGHT: 67 IN | WEIGHT: 176.6 LBS

## 2018-05-31 DIAGNOSIS — E11.9 TYPE 2 DIABETES MELLITUS WITHOUT COMPLICATION, WITHOUT LONG-TERM CURRENT USE OF INSULIN (HCC): ICD-10-CM

## 2018-05-31 DIAGNOSIS — R42 VERTIGO: ICD-10-CM

## 2018-05-31 DIAGNOSIS — M54.2 CERVICALGIA: ICD-10-CM

## 2018-05-31 DIAGNOSIS — E78.5 HYPERLIPIDEMIA, UNSPECIFIED HYPERLIPIDEMIA TYPE: ICD-10-CM

## 2018-05-31 DIAGNOSIS — I10 ESSENTIAL HYPERTENSION: ICD-10-CM

## 2018-05-31 DIAGNOSIS — E11.9 TYPE 2 DIABETES MELLITUS WITHOUT COMPLICATION, WITHOUT LONG-TERM CURRENT USE OF INSULIN (HCC): Primary | ICD-10-CM

## 2018-05-31 LAB
ALBUMIN SERPL-MCNC: 3.7 G/DL (ref 3.4–5)
ALBUMIN/GLOB SERPL: 1 {RATIO} (ref 0.8–1.7)
ALP SERPL-CCNC: 86 U/L (ref 45–117)
ALT SERPL-CCNC: 21 U/L (ref 13–56)
ANION GAP SERPL CALC-SCNC: 8 MMOL/L (ref 3–18)
APPEARANCE UR: CLEAR
AST SERPL-CCNC: 18 U/L (ref 15–37)
BACTERIA URNS QL MICRO: NEGATIVE /HPF
BASOPHILS # BLD: 0 K/UL (ref 0–0.06)
BASOPHILS NFR BLD: 1 % (ref 0–2)
BILIRUB SERPL-MCNC: 0.5 MG/DL (ref 0.2–1)
BILIRUB UR QL: NEGATIVE
BUN SERPL-MCNC: 15 MG/DL (ref 7–18)
BUN/CREAT SERPL: 20 (ref 12–20)
CALCIUM SERPL-MCNC: 9.6 MG/DL (ref 8.5–10.1)
CHLORIDE SERPL-SCNC: 102 MMOL/L (ref 100–108)
CHOLEST SERPL-MCNC: 141 MG/DL
CO2 SERPL-SCNC: 31 MMOL/L (ref 21–32)
COLOR UR: YELLOW
CREAT SERPL-MCNC: 0.76 MG/DL (ref 0.6–1.3)
DIFFERENTIAL METHOD BLD: ABNORMAL
EOSINOPHIL # BLD: 0.3 K/UL (ref 0–0.4)
EOSINOPHIL NFR BLD: 7 % (ref 0–5)
EPITH CASTS URNS QL MICRO: NORMAL /LPF (ref 0–5)
ERYTHROCYTE [DISTWIDTH] IN BLOOD BY AUTOMATED COUNT: 14.3 % (ref 11.6–14.5)
EST. AVERAGE GLUCOSE BLD GHB EST-MCNC: 151 MG/DL
GLOBULIN SER CALC-MCNC: 3.6 G/DL (ref 2–4)
GLUCOSE SERPL-MCNC: 123 MG/DL (ref 74–99)
GLUCOSE UR STRIP.AUTO-MCNC: NEGATIVE MG/DL
HBA1C MFR BLD: 6.9 % (ref 4.2–5.6)
HCT VFR BLD AUTO: 38.4 % (ref 35–45)
HDLC SERPL-MCNC: 77 MG/DL (ref 40–60)
HDLC SERPL: 1.8 {RATIO} (ref 0–5)
HGB BLD-MCNC: 13.5 G/DL (ref 12–16)
HGB UR QL STRIP: NEGATIVE
KETONES UR QL STRIP.AUTO: NEGATIVE MG/DL
LDLC SERPL CALC-MCNC: 53 MG/DL (ref 0–100)
LEUKOCYTE ESTERASE UR QL STRIP.AUTO: ABNORMAL
LIPID PROFILE,FLP: ABNORMAL
LYMPHOCYTES # BLD: 1.7 K/UL (ref 0.9–3.6)
LYMPHOCYTES NFR BLD: 40 % (ref 21–52)
MCH RBC QN AUTO: 27 PG (ref 24–34)
MCHC RBC AUTO-ENTMCNC: 35.2 G/DL (ref 31–37)
MCV RBC AUTO: 76.8 FL (ref 74–97)
MONOCYTES # BLD: 0.6 K/UL (ref 0.05–1.2)
MONOCYTES NFR BLD: 15 % (ref 3–10)
NEUTS SEG # BLD: 1.5 K/UL (ref 1.8–8)
NEUTS SEG NFR BLD: 37 % (ref 40–73)
NITRITE UR QL STRIP.AUTO: NEGATIVE
PH UR STRIP: 5.5 [PH] (ref 5–8)
PLATELET # BLD AUTO: 213 K/UL (ref 135–420)
PMV BLD AUTO: 10.5 FL (ref 9.2–11.8)
POTASSIUM SERPL-SCNC: 4.2 MMOL/L (ref 3.5–5.5)
PROT SERPL-MCNC: 7.3 G/DL (ref 6.4–8.2)
PROT UR STRIP-MCNC: NEGATIVE MG/DL
RBC # BLD AUTO: 5 M/UL (ref 4.2–5.3)
RBC #/AREA URNS HPF: 0 /HPF (ref 0–5)
SODIUM SERPL-SCNC: 141 MMOL/L (ref 136–145)
SP GR UR REFRACTOMETRY: 1.01 (ref 1–1.03)
TRIGL SERPL-MCNC: 55 MG/DL (ref ?–150)
TSH SERPL DL<=0.05 MIU/L-ACNC: 0.84 UIU/ML (ref 0.36–3.74)
UROBILINOGEN UR QL STRIP.AUTO: 1 EU/DL (ref 0.2–1)
VLDLC SERPL CALC-MCNC: 11 MG/DL
WBC # BLD AUTO: 4 K/UL (ref 4.6–13.2)
WBC URNS QL MICRO: NORMAL /HPF (ref 0–4)

## 2018-05-31 PROCEDURE — 80061 LIPID PANEL: CPT | Performed by: FAMILY MEDICINE

## 2018-05-31 PROCEDURE — 80053 COMPREHEN METABOLIC PANEL: CPT | Performed by: FAMILY MEDICINE

## 2018-05-31 PROCEDURE — 84443 ASSAY THYROID STIM HORMONE: CPT | Performed by: FAMILY MEDICINE

## 2018-05-31 PROCEDURE — 81001 URINALYSIS AUTO W/SCOPE: CPT | Performed by: FAMILY MEDICINE

## 2018-05-31 PROCEDURE — 85025 COMPLETE CBC W/AUTO DIFF WBC: CPT | Performed by: FAMILY MEDICINE

## 2018-05-31 PROCEDURE — 36415 COLL VENOUS BLD VENIPUNCTURE: CPT | Performed by: FAMILY MEDICINE

## 2018-05-31 PROCEDURE — 83036 HEMOGLOBIN GLYCOSYLATED A1C: CPT | Performed by: FAMILY MEDICINE

## 2018-05-31 NOTE — MR AVS SNAPSHOT
[address] [place] [address] 
[phone] Patient: Milad Christiansen MRN: [id number] :1935 Visit Information Date & Time Provider Department Dept. Phone Encounter #  
 2018  8:00 AM Swapnil Olvera MD [employer] [phone] [id number] Follow-up Instructions Return in about 3 months (around 2018). Upcoming Health Maintenance Date Due Pneumococcal 65+ Low/Medium Risk (2 of 2 - PPSV23) 3/19/2016 Influenza Age 5 to Adult 2018 HEMOGLOBIN A1C Q6M 2018 EYE EXAM RETINAL OR DILATED Q1 2018 FOOT EXAM Q1 2018 MEDICARE YEARLY EXAM 2018 MICROALBUMIN Q1 2018 LIPID PANEL Q1 2018 GLAUCOMA SCREENING Q2Y 2019 DTaP/Tdap/Td series (2 - Td) 2024 Allergies as of 2018  Review Complete On: 2018 By: Swapnil Olvera MD  
  
 Severity Noted Reaction Type Reactions Latex, Natural Rubber Medium 2015    Other (comments) Adhesive  2015    Contact Dermatitis LEAVES DA ON SKIN 
USE PAPER TAPE Current Immunizations  Reviewed on 2018 Name Date Influenza High Dose Vaccine PF 2017 Pneumococcal Conjugate (PCV-13) 3/19/2015 Tdap 2014 Zoster Vaccine, Live 2014 Not reviewed this visit You Were Diagnosed With   
  
 Codes Comments Type 2 diabetes mellitus without complication, without long-term current use of insulin (HCC)    -  Primary ICD-10-CM: E11.9 ICD-9-CM: 250.00 Hyperlipidemia, unspecified hyperlipidemia type     ICD-10-CM: E78.5 ICD-9-CM: 272.4 Essential hypertension     ICD-10-CM: I10 
ICD-9-CM: 401.9 Cervicalgia     ICD-10-CM: M54.2 ICD-9-CM: 723.1 Vertigo     ICD-10-CM: Z20 ICD-9-CM: 780.4 Vitals BP Pulse Temp Resp Height(growth percentile) Weight(growth percentile) 134/70 (BP 1 Location: Right arm, BP Patient Position: Sitting) 80 97.3 °F (36.3 °C) (Oral) 20 5' 7\" (1.702 m) 176 lb 9.6 oz (80.1 kg) SpO2 BMI OB Status Smoking Status 97% 27.66 kg/m2 Hysterectomy Former Smoker Vitals History BMI and BSA Data Body Mass Index Body Surface Area  
 27.66 kg/m 2 1.95 m 2 Preferred Pharmacy Pharmacy Name Phone [address], Mehrdad Benson [place] [phone] Your Updated Medication List  
  
   
This list is accurate as of 5/31/18  8:46 AM.  Always use your most recent med list. amLODIPine 2.5 mg tablet Commonly known as:  Rui Matar Take 1 Tab by mouth daily. atorvastatin 10 mg tablet Commonly known as:  LIPITOR Take 1 Tab by mouth daily. Blood-Glucose Meter monitoring kit Commonly known as:  FREESTYLE LITE METER Use as directed to check blood sugar once a day  
  
 glucose blood VI test strips strip Commonly known as:  FREESTYLE LITE STRIPS Use as directed to check blood sugar once a day  
  
 hydroCHLOROthiazide 12.5 mg tablet Commonly known as:  HYDRODIURIL Take 1 Tab by mouth daily. Lancets Misc Commonly known as:  FREESTYLE LANCETS Use as directed to check blood sugar once a day. losartan 100 mg tablet Commonly known as:  COZAAR Take 1 Tab by mouth daily. meclizine 25 mg tablet Commonly known as:  ANTIVERT Take 1 Tab by mouth three (3) times daily as needed. metFORMIN 500 mg tablet Commonly known as:  GLUCOPHAGE Take 1 Tab by mouth two (2) times daily (with meals). Indications: type 2 diabetes mellitus  
  
 triamcinolone acetonide 0.1 % topical cream  
Commonly known as:  KENALOG Use as directed. VOLTAREN 1 % Gel Generic drug:  diclofenac XALATAN 0.005 % ophthalmic solution Generic drug:  latanoprost  
Administer 1 Drop to both eyes nightly. Follow-up Instructions Return in about 3 months (around 8/31/2018). Introducing [facility] & HEALTH SERVICES! Dear Jania Lopez: 
Thank you for requesting a Xplore Mobility account. Our records indicate that you already have an active Xplore Mobility account. You can access your account anytime at [email or web address] Did you know that you can access your hospital and ER discharge instructions at any time in Xplore Mobility? You can also review all of your test results from your hospital stay or ER visit. Additional Information If you have questions, please visit the Frequently Asked Questions section of the Xplore [employer]ility website at [email or web address] Remember, Xplore Mobility is NOT to be used for urgent needs. For medical emergencies, dial 911. Now available from your iPhone and Android! Please provide this summary of care documentation to your next provider. Your primary care clinician is listed as Migdalia Martinez. If you have any questions after today's visit, please call [phone].

## 2018-05-31 NOTE — PROGRESS NOTES
SUBJECTIVE     Chief Complaint   Patient presents with    Diabetes    Hypertension       HPI:     Her BP is being treated with amlodipine, losartan and hydrochlorothiazide    Her glucose is running good with life style and Glucophage. No hypoglycemia or symptoms. Her lipids are being treated. She is following diet for lipids and DM. She has recurrent dysbalance / dizziness. She has seen ENT. Her problem with balance or vertigo is now rare. She uses as needed Antivert with good results. She also has recurrent pain in her right trapezius area. She has not had any recent injury. It improved with neck care in the past but she has stopped doing it. No radicular pain or focal weakness. No other Systemic, Cardiovascular or Respiratory symptoms. PMH: Hypertension 2006, DM II 2005, Hyperlipidemia, BRIAN, Chronic Allergic Rhinitis, Iron and Folate Deficiency Anemia, Glaucoma, Thyroid Nodules (neg FNA), Seborrheic Karatosis, Cataract Surgery 2012, Hysterectomy 1993 (Fibroids), Bunionectomy 1995, Herniorrhapy 2002, Colonic Polyps (due 2017). Current Outpatient Prescriptions   Medication Sig    glucose blood VI test strips (FREESTYLE LITE STRIPS) strip Use as directed to check blood sugar once a day    losartan (COZAAR) 100 mg tablet Take 1 Tab by mouth daily.  amLODIPine (NORVASC) 2.5 mg tablet Take 1 Tab by mouth daily.  metFORMIN (GLUCOPHAGE) 500 mg tablet Take 1 Tab by mouth two (2) times daily (with meals). Indications: type 2 diabetes mellitus    hydroCHLOROthiazide (HYDRODIURIL) 12.5 mg tablet Take 1 Tab by mouth daily. (Patient taking differently: Take 12.5 mg by mouth every other day.)    atorvastatin (LIPITOR) 10 mg tablet Take 1 Tab by mouth daily.  VOLTAREN 1 % gel     triamcinolone acetonide (KENALOG) 0.1 % topical cream Use as directed.     Blood-Glucose Meter (FREESTYLE LITE METER) monitoring kit Use as directed to check blood sugar once a day    Lancets (FREESTYLE LANCETS) misc Use as directed to check blood sugar once a day.  latanoprost (XALATAN) 0.005 % ophthalmic solution Administer 1 Drop to both eyes nightly.  meclizine (ANTIVERT) 25 mg tablet Take 1 Tab by mouth three (3) times daily as needed. No current facility-administered medications for this visit. Allergies: No known allergy to drugs. ROS:   Constitutional: No fever, chills, night sweats, malaise. Ear/Nose/Throat: No ear/ throat pain, lesions, unusual discharge, new speaking or hearing problems, nose bleed. Cardiovascular: No angina, palpitations, PND, orthopnea, lightheadedness, edema, claudication. Respiratory: No dyspnea, wheeze, pleurisy, hemoptysis, unusual cough or sputum. Gastrointestinal: No nausea/ vomiting, bowel habit change, pain, ERA symptoms, melena, hematochezia, anorexia. Psychiatric: No agitation, confusion/disorientation, suicidal or homicidal ideation. Musculoskeletal: No focal weakness. Musculoskeletal: no other complaints. OBJECTIVE   Constitutional: General Appearance: well developed, overweight, nontoxic, in no acute distress. Visit Vitals    /70 (BP 1 Location: Right arm, BP Patient Position: Sitting)    Pulse 80    Temp 97.3 °F (36.3 °C) (Oral)    Resp 20    Ht 5' 7\" (1.702 m)    Wt 176 lb 9.6 oz (80.1 kg)    SpO2 97%    BMI 27.66 kg/m2     Otoscopic Examination: external auditory canals are clear; tympanic membranes are dull. Nasal Cavity: mildly swollen mucosa & turbinates. Maxillas are not tender w/o redness or heat. Throat: clear tonsils, oropharynx, posterior pharynx. Pulmonary: Respiratory effort: normal; no dyspnea, no retractions, no accessory muscle use. Auscultation: normal & symmetrical air exchange; no rales, no rhonchi, no wheeze; no rubs     Cardiovascular: Palpation: PMI not displaced or enlarged, no thrills or heaves. Auscultation: RRR; no murmur, rubs or gallops. Extremities: no edema, no active varicosity. GI[de-identified] Normal bowel sounds. No masses; upper RUQ tenderness lateral to xyphoid; no rebound/rigidity; no CVA tenderness. Psychiatric[de-identified] Oriented to time, place and person. Normal mood, no agitation or anxiety. Normal affect. Pleasant and cooperative. Neurological[de-identified] CN I to XII: intact. DTR: symmetrical & normal.    Musculoskeletal[de-identified]   NC/AT,neck is supple. Neck: Tenderness to palpation of right intermediate trapezius. Range of motion with pain on right. There is mild spasm of the intermediate trapezius. No redness heat or induration of the skin. Upper extremities: No focal weakness. 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 .      Lab Results   Component Value Date/Time    WBC 5.3 08/21/2017 02:08 PM    HGB 12.8 08/21/2017 02:08 PM    HCT 37.2 08/21/2017 02:08 PM    PLATELET 569 57/70/5080 02:08 PM    MCV 77.8 08/21/2017 02:08 PM     Lab Results   Component Value Date/Time    Sodium 139 11/27/2017 10:25 AM    Potassium 4.3 11/27/2017 10:25 AM    Chloride 100 11/27/2017 10:25 AM    CO2 28 11/27/2017 10:25 AM    Anion gap 11.2 11/27/2017 10:25 AM    Glucose 101 (H) 11/27/2017 10:25 AM    BUN 16 11/27/2017 10:25 AM    Creatinine 0.7 (L) 11/27/2017 10:25 AM    BUN/Creatinine ratio 15 08/21/2017 02:08 PM    GFR est AA >60 08/21/2017 02:08 PM    GFR est non-AA >60 08/21/2017 02:08 PM    Calcium 9.6 11/27/2017 10:25 AM    Bilirubin, total 0.6 11/27/2017 10:25 AM    AST (SGOT) 22 11/27/2017 10:25 AM    Alk.  phosphatase 66 11/27/2017 10:25 AM    Protein, total 7.6 11/27/2017 10:25 AM    Albumin 4.0 11/27/2017 10:25 AM    Globulin 3.6 11/27/2017 10:25 AM    A-G Ratio 1.1 11/27/2017 10:25 AM    ALT (SGPT) 14 11/27/2017 10:25 AM     Lab Results   Component Value Date/Time    Cholesterol, total 147 11/27/2017 10:25 AM    HDL Cholesterol 92 (H) 11/27/2017 10:25 AM    LDL, calculated 45 (L) 11/27/2017 10:25 AM    VLDL, calculated 9 11/27/2017 10:25 AM    Triglyceride 47 11/27/2017 10:25 AM     Lab Results Component Value Date/Time    Hemoglobin A1c 6.1 (H) 11/27/2017 10:25 AM    Hemoglobin A1c (POC) 6.9 02/21/2018 12:10 PM        ASSESSMENT     1. Type 2 diabetes mellitus without complication, without long-term current use of insulin ([facility] [address]    2. Hyperlipidemia, unspecified hyperlipidemia type    3. Essential hypertension    4. Cervicalgia    5. Vertigo        PLAN   Pharmacologic Management: Medications reviewed with the patient. No change. Orders Placed This Encounter    CBC WITH AUTOMATED DIFF    METABOLIC PANEL, COMPREHENSIVE    LIPID PANEL    URINALYSIS W/ RFLX MICROSCOPIC    TSH 3RD GENERATION    HEMOGLOBIN A1C WITH EAG     Gentle neck ROM exercises, moist heat and posture care to avoid further strain. Fall precautions discussed. Discussed DDx, follow-up & work-up. Discussed risk/benefit & side effect of treatment. Low Salt ADA diet & graduated excecise. Follow up as planned, prn sooner. Regular BS check at home and notify MD of results prn. Health risk from non adherence discussed. Patent voiced understanding. Follow-up Disposition:  Return in about 3 months (around 8/31/2018).     Matthew Osler, MD

## 2018-06-01 NOTE — PROGRESS NOTES
Neutrophil count was a little low like in 3 years ago. This will need to be repeated in next visit in 3 months to confirm stability. Urine showed few white cells. If the patient is symptomatic we will need to do a culture. Hemoglobin A1c was much higher; but still below 7.0. The patient is advised to continue diet and exercise as tolerated. We will follow.

## 2018-06-04 ENCOUNTER — TELEPHONE (OUTPATIENT)
Dept: FAMILY MEDICINE CLINIC | Age: 83
End: 2018-06-04

## 2018-06-04 NOTE — TELEPHONE ENCOUNTER
----- Message from Valarie Felix MD sent at 6/1/2018  9:14 AM EDT -----  Neutrophil count was a little low like in 3 years ago. This will need to be repeated in next visit in 3 months to confirm stability. Urine showed few white cells. If the patient is symptomatic we will need to do a culture. Hemoglobin A1c was much higher; but still below 7.0. The patient is advised to continue diet and exercise as tolerated. We will follow.

## 2018-08-07 DIAGNOSIS — E11.9 TYPE 2 DIABETES MELLITUS WITHOUT COMPLICATION, WITHOUT LONG-TERM CURRENT USE OF INSULIN (HCC): ICD-10-CM

## 2018-08-07 RX ORDER — METFORMIN HYDROCHLORIDE 500 MG/1
500 TABLET ORAL 2 TIMES DAILY WITH MEALS
Qty: 180 TAB | Refills: 3 | Status: SHIPPED | OUTPATIENT
Start: 2018-08-07 | End: 2018-10-04 | Stop reason: SDUPTHER

## 2018-08-07 RX ORDER — LOSARTAN POTASSIUM 100 MG/1
100 TABLET ORAL DAILY
Qty: 90 TAB | Refills: 3 | Status: SHIPPED | OUTPATIENT
Start: 2018-08-07 | End: 2019-08-14 | Stop reason: SDUPTHER

## 2018-08-07 NOTE — TELEPHONE ENCOUNTER
Requested Prescriptions     Pending Prescriptions Disp Refills    metFORMIN (GLUCOPHAGE) 500 mg tablet 180 Tab 3     Sig: Take 1 Tab by mouth two (2) times daily (with meals). Indications: type 2 diabetes mellitus    losartan (COZAAR) 100 mg tablet 90 Tab 1     Sig: Take 1 Tab by mouth daily. Patient calling to request refill on:      Remaining pills:  1 week  Last appt: 5/31/18  Next appt: 8/30/18    Pharmacy: [facility]/ in chart      Patient aware of 72 hour time frame.

## 2018-08-30 ENCOUNTER — OFFICE VISIT (OUTPATIENT)
Dept: FAMILY MEDICINE CLINIC | Age: 83
End: 2018-08-30

## 2018-08-30 ENCOUNTER — HOSPITAL ENCOUNTER (OUTPATIENT)
Dept: LAB | Age: 83
Discharge: HOME | End: 2018-08-30
Payer: MEDICARE

## 2018-08-30 ENCOUNTER — TELEPHONE (OUTPATIENT)
Dept: FAMILY MEDICINE CLINIC | Age: 83
End: 2018-08-30

## 2018-08-30 VITALS
WEIGHT: 175 LBS | TEMPERATURE: 97.9 F | BODY MASS INDEX: 27.47 KG/M2 | DIASTOLIC BLOOD PRESSURE: 84 MMHG | OXYGEN SATURATION: 96 % | RESPIRATION RATE: 14 BRPM | HEIGHT: 67 IN | SYSTOLIC BLOOD PRESSURE: 136 MMHG | HEART RATE: 81 BPM

## 2018-08-30 DIAGNOSIS — E78.5 HYPERLIPIDEMIA, UNSPECIFIED HYPERLIPIDEMIA TYPE: ICD-10-CM

## 2018-08-30 DIAGNOSIS — E04.1 THYROID NODULE: ICD-10-CM

## 2018-08-30 DIAGNOSIS — E11.9 TYPE 2 DIABETES MELLITUS WITHOUT COMPLICATION, WITHOUT LONG-TERM CURRENT USE OF INSULIN (HCC): ICD-10-CM

## 2018-08-30 DIAGNOSIS — Z23 ENCOUNTER FOR IMMUNIZATION: ICD-10-CM

## 2018-08-30 DIAGNOSIS — I10 ESSENTIAL HYPERTENSION: ICD-10-CM

## 2018-08-30 DIAGNOSIS — I10 ESSENTIAL HYPERTENSION: Primary | ICD-10-CM

## 2018-08-30 DIAGNOSIS — M54.2 CERVICALGIA: ICD-10-CM

## 2018-08-30 LAB
APPEARANCE UR: CLEAR
BASOPHILS # BLD: 0 K/UL (ref 0–0.1)
BASOPHILS NFR BLD: 1 % (ref 0–2)
BILIRUB UR QL: NEGATIVE
COLOR UR: YELLOW
DIFFERENTIAL METHOD BLD: ABNORMAL
EOSINOPHIL # BLD: 0.1 K/UL (ref 0–0.4)
EOSINOPHIL NFR BLD: 1 % (ref 0–5)
ERYTHROCYTE [DISTWIDTH] IN BLOOD BY AUTOMATED COUNT: 14.9 % (ref 11.6–14.5)
GLUCOSE UR STRIP.AUTO-MCNC: NEGATIVE MG/DL
HCT VFR BLD AUTO: 40.4 % (ref 35–45)
HGB BLD-MCNC: 14.1 G/DL (ref 12–16)
HGB UR QL STRIP: NEGATIVE
KETONES UR QL STRIP.AUTO: NEGATIVE MG/DL
LEUKOCYTE ESTERASE UR QL STRIP.AUTO: NEGATIVE
LYMPHOCYTES # BLD: 2.9 K/UL (ref 0.9–3.6)
LYMPHOCYTES NFR BLD: 48 % (ref 21–52)
MCH RBC QN AUTO: 27.2 PG (ref 24–34)
MCHC RBC AUTO-ENTMCNC: 34.9 G/DL (ref 31–37)
MCV RBC AUTO: 78 FL (ref 74–97)
MONOCYTES # BLD: 0.7 K/UL (ref 0.05–1.2)
MONOCYTES NFR BLD: 12 % (ref 3–10)
NEUTS SEG # BLD: 2.3 K/UL (ref 1.8–8)
NEUTS SEG NFR BLD: 38 % (ref 40–73)
NITRITE UR QL STRIP.AUTO: NEGATIVE
PH UR STRIP: 7.5 [PH] (ref 5–8)
PLATELET # BLD AUTO: 238 K/UL (ref 135–420)
PMV BLD AUTO: 10.9 FL (ref 9.2–11.8)
PROT UR STRIP-MCNC: NEGATIVE MG/DL
RBC # BLD AUTO: 5.18 M/UL (ref 4.2–5.3)
SP GR UR REFRACTOMETRY: 1.01 (ref 1–1.03)
UROBILINOGEN UR QL STRIP.AUTO: 0.2 EU/DL (ref 0.2–1)
WBC # BLD AUTO: 6 K/UL (ref 4.6–13.2)

## 2018-08-30 PROCEDURE — 36415 COLL VENOUS BLD VENIPUNCTURE: CPT | Performed by: FAMILY MEDICINE

## 2018-08-30 PROCEDURE — 81003 URINALYSIS AUTO W/O SCOPE: CPT | Performed by: FAMILY MEDICINE

## 2018-08-30 PROCEDURE — 83036 HEMOGLOBIN GLYCOSYLATED A1C: CPT | Performed by: FAMILY MEDICINE

## 2018-08-30 PROCEDURE — 85025 COMPLETE CBC W/AUTO DIFF WBC: CPT | Performed by: FAMILY MEDICINE

## 2018-08-30 RX ORDER — ATORVASTATIN CALCIUM 10 MG/1
10 TABLET, FILM COATED ORAL DAILY
Qty: 90 TAB | Refills: 3 | Status: SHIPPED | OUTPATIENT
Start: 2018-08-30 | End: 2018-12-03 | Stop reason: SINTOL

## 2018-08-30 RX ORDER — ATORVASTATIN CALCIUM 10 MG/1
10 TABLET, FILM COATED ORAL DAILY
Qty: 90 TAB | Refills: 3 | Status: SHIPPED | OUTPATIENT
Start: 2018-08-30 | End: 2018-08-30 | Stop reason: SDUPTHER

## 2018-08-30 NOTE — MR AVS SNAPSHOT
[facility] 
 
 
 [address] [facility] [address] [place] [address] [address] [facility] [address] 
[phone] Patient: Alissa Moseley MRN: [id number] :1935 Visit Information Date & Time Provider Department Dept. Phone Encounter #  
 2018 10:30 AM Jens Jones MD [address] [facility] [phone] [id number] Follow-up Instructions Return in about 3 months (around 2018). Your Appointments 12/3/2018  8:00 AM  
Follow Up with Jens Jones MD  
[address] [facility] (Kyler Castillo) Appt Note: 3 month f/u  
 [address] [facility] [address] [place] [address]  
[phone]  
  
   
 [facility] [address] [place] [address] Upcoming Health Maintenance Date Due Pneumococcal 65+ Low/Medium Risk (2 of 2 - PPSV23) 3/19/2016 Influenza Age 5 to Adult 2018 EYE EXAM RETINAL OR DILATED Q1 2018 FOOT EXAM Q1 2018 MEDICARE YEARLY EXAM 2018 MICROALBUMIN Q1 2018 HEMOGLOBIN A1C Q6M 2018 LIPID PANEL Q1 2019 GLAUCOMA SCREENING Q2Y 2019 DTaP/Tdap/Td series (2 - Td) 2024 Allergies as of 2018  Review Complete On: 2018 By: Jens Jones MD  
  
 Severity Noted Reaction Type Reactions Latex, Natural Rubber Medium 2015    Other (comments) Adhesive  2015    Contact Dermatitis LEAVES DA ON SKIN 
USE PAPER TAPE Current Immunizations  Reviewed on 2018 Name Date Influenza High Dose Vaccine PF 2017 Pneumococcal Conjugate (PCV-13) 3/19/2015 Pneumococcal Polysaccharide (PPSV-23) 2013 Tdap 2014 Zoster Vaccine, Live 2014 Not reviewed this visit You Were Diagnosed With   
  
 Codes Comments Essential hypertension    -  Primary ICD-10-CM: I10 
ICD-9-CM: 401.9 Hyperlipidemia, unspecified hyperlipidemia type     ICD-10-CM: E78.5 ICD-9-CM: 272.4 Type 2 diabetes mellitus without complication, without long-term current use of insulin (HCC)     ICD-10-CM: E11.9 ICD-9-CM: 250.00 Cervicalgia     ICD-10-CM: M54.2 ICD-9-CM: 723.1 Thyroid nodule     ICD-10-CM: E04.1 ICD-9-CM: 241.0 Vitals BP Pulse Temp Resp Height(growth percentile) Weight(growth percentile) 136/84 (BP 1 Location: Left arm, BP Patient Position: Sitting) 81 97.9 °F (36.6 °C) (Oral) 14 5' 7\" (1.702 m) 175 lb (79.4 kg) LMP SpO2 BMI OB Status Smoking Status (LMP Unknown) 96% 27.41 kg/m2 Hysterectomy Former Smoker BMI and BSA Data Body Mass Index Body Surface Area  
 27.41 kg/m 2 1.94 m 2 Preferred Pharmacy Pharmacy Name Phone [address] [facility] [phone] Your Updated Medication List  
  
   
This list is accurate as of 8/30/18 11:57 AM.  Always use your most recent med list. amLODIPine 2.5 mg tablet Commonly known as:  Ryan Ross Take 1 Tab by mouth daily. atorvastatin 10 mg tablet Commonly known as:  LIPITOR Take 1 Tab by mouth daily. Blood-Glucose Meter monitoring kit Commonly known as:  FREESTYLE LITE METER Use as directed to check blood sugar once a day  
  
 glucose blood VI test strips strip Commonly known as:  FREESTYLE LITE STRIPS Use as directed to check blood sugar once a day  
  
 hydroCHLOROthiazide 12.5 mg tablet Commonly known as:  HYDRODIURIL Take 1 Tab by mouth daily. Lancets Misc Commonly known as:  FREESTYLE LANCETS Use as directed to check blood sugar once a day. losartan 100 mg tablet Commonly known as:  COZAAR Take 1 Tab by mouth daily. meclizine 25 mg tablet Commonly known as:  ANTIVERT Take 1 Tab by mouth three (3) times daily as needed. metFORMIN 500 mg tablet Commonly known as:  GLUCOPHAGE Take 1 Tab by mouth two (2) times daily (with meals). Indications: type 2 diabetes mellitus  
  
 triamcinolone acetonide 0.1 % topical cream  
Commonly known as:  KENALOG Use as directed. VOLTAREN 1 % Gel Generic drug:  diclofenac XALATAN 0.005 % ophthalmic solution Generic drug:  latanoprost  
Administer 1 Drop to both eyes nightly. Prescriptions Printed Refills  
 atorvastatin (LIPITOR) 10 mg tablet 3 Sig: Take 1 Tab by mouth daily. Class: Print Route: Oral  
  
Follow-up Instructions Return in about 3 months (around 11/30/2018). To-Do List   
 08/30/2018 Imaging:  XR SPINE CERV 4 OR 5 V   
  
 09/06/2018 Imaging:  US THYROID/PARATHYROID/SOFT TISS Introducing [facility] & HEALTH SERVICES! Dear Milagro Tapia: 
Thank you for requesting a [employer] account. Our records indicate that you already have an active [employer]iacor account. You can access your account anytime at [email or web address] Did you know that you can access your hospital and ER discharge instructions at any time in [employer]iacor? You can also review all of your test results from your hospital stay or ER visit. Additional Information If you have questions, please visit the Frequently Asked Questions section of the [employer]or website at [email or web address] Remember, [employer]or is NOT to be used for urgent needs. For medical emergencies, dial 911. Now available from your iPhone and Android! Please provide this summary of care documentation to your next provider. Your primary care clinician is listed as Theresa Oviedo. If you have any questions after today's visit, please call [phone].

## 2018-08-30 NOTE — TELEPHONE ENCOUNTER
Patient returned call and has been notified of results. Verbal understanding given. Closing encounter.

## 2018-08-30 NOTE — TELEPHONE ENCOUNTER
----- Message from Sheryl Ureña MD sent at 8/30/2018  2:03 PM EDT -----  Neck x-ray shows moderate multilevel degenerative  disc changes. We will continue with the range of motion exercises as advised.

## 2018-08-30 NOTE — PROGRESS NOTES
SUBJECTIVE     Chief Complaint   Patient presents with    Hypertension    Diabetes    Cholesterol Problem       HPI:     Her BP is being treated with amlodipine, losartan and hydrochlorothiazide    Her glucose is running good with life style and Glucophage. No hypoglycemia or symptoms. Her lipids are being treated. She is following diet for lipids and DM. She has history of right thyroid nodule which has been biopsied and was found to be benign. She feels that it may be getting a little little larger in recent years. She has recurrent dysbalance / dizziness. She has seen ENT. Her problem with balance or vertigo is now rare. She uses as needed Antivert with good results. She also has recurrent pain in her right trapezius area. She has not had any recent injury. No radicular pain or focal weakness. It is improving with neck range of motion exercises and posterior care. No other Systemic, Cardiovascular or Respiratory symptoms. PMH: Hypertension 2006, DM II 2005, Hyperlipidemia, BRIAN, Chronic Allergic Rhinitis, Iron and Folate Deficiency Anemia, Glaucoma, Thyroid Nodules (neg FNA), Seborrheic Karatosis, Cataract Surgery 2012, Hysterectomy 1993 (Fibroids), Bunionectomy 1995, Herniorrhapy 2002, Colonic Polyps (due 2017). Current Outpatient Prescriptions   Medication Sig    metFORMIN (GLUCOPHAGE) 500 mg tablet Take 1 Tab by mouth two (2) times daily (with meals). Indications: type 2 diabetes mellitus    losartan (COZAAR) 100 mg tablet Take 1 Tab by mouth daily.  glucose blood VI test strips (FREESTYLE LITE STRIPS) strip Use as directed to check blood sugar once a day    amLODIPine (NORVASC) 2.5 mg tablet Take 1 Tab by mouth daily.  hydroCHLOROthiazide (HYDRODIURIL) 12.5 mg tablet Take 1 Tab by mouth daily. (Patient taking differently: Take 12.5 mg by mouth every other day.)    atorvastatin (LIPITOR) 10 mg tablet Take 1 Tab by mouth daily.     VOLTAREN 1 % gel     meclizine (ANTIVERT) 25 mg tablet Take 1 Tab by mouth three (3) times daily as needed.  triamcinolone acetonide (KENALOG) 0.1 % topical cream Use as directed.  Blood-Glucose Meter (FREESTYLE LITE METER) monitoring kit Use as directed to check blood sugar once a day    Lancets (FREESTYLE LANCETS) misc Use as directed to check blood sugar once a day.  latanoprost (XALATAN) 0.005 % ophthalmic solution Administer 1 Drop to both eyes nightly. No current facility-administered medications for this visit. Allergies: No known allergy to drugs. ROS:   Constitutional: No fever, chills, night sweats, malaise. Ear/Nose/Throat: No ear/ throat pain, lesions, unusual discharge, new speaking or hearing problems, nose bleed. Cardiovascular: No angina, palpitations, PND, orthopnea, lightheadedness, edema, claudication. Respiratory: No dyspnea, wheeze, pleurisy, hemoptysis, unusual cough or sputum. Gastrointestinal: No nausea/ vomiting, bowel habit change, pain, ERA symptoms, melena, hematochezia, anorexia. Psychiatric: No agitation, confusion/disorientation, suicidal or homicidal ideation. Musculoskeletal: No focal weakness. No other complaints. OBJECTIVE   Constitutional: General Appearance: well developed, overweight, nontoxic, in no acute distress. Visit Vitals    /84 (BP 1 Location: Left arm, BP Patient Position: Sitting)    Pulse 81    Temp 97.9 °F (36.6 °C) (Oral)    Resp 14    Ht 5' 7\" (1.702 m)    Wt 175 lb (79.4 kg)    SpO2 96%    BMI 27.41 kg/m2     Neck Area[de-identified] Neck: trachea is midline. Thyroid:  without significant enlargement; small cystic mass on right side. No tenderness, induration, redness or heat. Otoscopic Examination: external auditory canals are clear; tympanic membranes are dull. Nasal Cavity: mildly swollen mucosa & turbinates. Maxillas are not tender w/o redness or heat. Throat: clear tonsils, oropharynx, posterior pharynx.     Pulmonary: Respiratory effort: normal; no dyspnea, no retractions, no accessory muscle use. Auscultation: normal & symmetrical air exchange; no rales, no rhonchi, no wheeze; no rubs     Cardiovascular: Palpation: PMI not displaced or enlarged, no thrills or heaves. Auscultation: RRR; no murmur, rubs or gallops. Extremities: no edema, no active varicosity. GI[de-identified] Normal bowel sounds. No masses; upper RUQ tenderness lateral to xyphoid; no rebound/rigidity; no CVA tenderness. Psychiatric[de-identified] Oriented to time, place and person. Normal mood, no agitation or anxiety. Normal affect. Pleasant and cooperative. Neurological[de-identified] CN I to XII: intact. DTR: symmetrical & normal.    Musculoskeletal[de-identified]   NC/AT,neck is supple. Neck: Tenderness to palpation of right intermediate trapezius. Range of motion with pain on right. There is mild spasm of the intermediate trapezius. No redness heat or induration of the skin. Upper extremities: No focal weakness. 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 .      Lab Results   Component Value Date/Time    WBC 4.0 (L) 05/31/2018 08:36 AM    HGB 13.5 05/31/2018 08:36 AM    HCT 38.4 05/31/2018 08:36 AM    PLATELET 531 55/75/1477 08:36 AM    MCV 76.8 05/31/2018 08:36 AM     Lab Results   Component Value Date/Time    Sodium 141 05/31/2018 08:36 AM    Potassium 4.2 05/31/2018 08:36 AM    Chloride 102 05/31/2018 08:36 AM    CO2 31 05/31/2018 08:36 AM    Anion gap 8 05/31/2018 08:36 AM    Glucose 123 (H) 05/31/2018 08:36 AM    BUN 15 05/31/2018 08:36 AM    Creatinine 0.76 05/31/2018 08:36 AM    BUN/Creatinine ratio 20 05/31/2018 08:36 AM    GFR est AA >60 05/31/2018 08:36 AM    GFR est non-AA >60 05/31/2018 08:36 AM    Calcium 9.6 05/31/2018 08:36 AM    Bilirubin, total 0.5 05/31/2018 08:36 AM    AST (SGOT) 18 05/31/2018 08:36 AM    Alk.  phosphatase 86 05/31/2018 08:36 AM    Protein, total 7.3 05/31/2018 08:36 AM    Albumin 3.7 05/31/2018 08:36 AM    Globulin 3.6 05/31/2018 08:36 AM    A-G Ratio 1.0 05/31/2018 08:36 AM    ALT (SGPT) 21 05/31/2018 08:36 AM     Lab Results   Component Value Date/Time    Cholesterol, total 141 05/31/2018 08:36 AM    HDL Cholesterol 77 (H) 05/31/2018 08:36 AM    LDL, calculated 53 05/31/2018 08:36 AM    VLDL, calculated 11 05/31/2018 08:36 AM    Triglyceride 55 05/31/2018 08:36 AM    CHOL/HDL Ratio 1.8 05/31/2018 08:36 AM     Lab Results   Component Value Date/Time    Hemoglobin A1c 6.9 (H) 05/31/2018 08:36 AM    Hemoglobin A1c (POC) 6.9 02/21/2018 12:10 PM        ASSESSMENT     1. Essential hypertension    2. Hyperlipidemia, unspecified hyperlipidemia type    3. Type 2 diabetes mellitus without complication, without long-term current use of insulin (HCC)    4. Cervicalgia    5. Thyroid nodule        PLAN   Pharmacologic Management: Medications reviewed with the patient. No change. Orders Placed This Encounter    XR SPINE CERV 4 OR 5 V    US THYROID/PARATHYROID/SOFT TISS    CBC WITH AUTOMATED DIFF    HEMOGLOBIN A1C WITH EAG    URINALYSIS W/ RFLX MICROSCOPIC    atorvastatin (LIPITOR) 10 mg tablet     Gentle neck ROM exercises, moist heat and posture care to avoid further strain. Fall precautions discussed. She declined referral to orthopedic or physical therapy or ENT. Discussed DDx, follow-up & work-up. Discussed risk/benefit & side effect of treatment. Low Salt ADA diet & graduated excecise. Follow up as planned, prn sooner. Regular BS check at home and notify MD of results prn. Health risk from non adherence discussed. Patent voiced understanding. Follow-up Disposition:  Return in about 3 months (around 11/30/2018).     Sheryl Ureña MD

## 2018-08-31 LAB
EST. AVERAGE GLUCOSE BLD GHB EST-MCNC: 134 MG/DL
HBA1C MFR BLD: 6.3 % (ref 4.2–5.6)

## 2018-09-04 ENCOUNTER — TELEPHONE (OUTPATIENT)
Dept: FAMILY MEDICINE CLINIC | Age: 83
End: 2018-09-04

## 2018-09-04 NOTE — LETTER
9/4/2018 1:18 PM 
 
Ms. Rodas [address]y [address] [facility] [place] [address] Dear Adrianna Booth I have reviewed your recent lab tests and have found the results to be within normal ranges. Your HgbA1c was much better and your CBC is stable. Please call if you have any questions [phone] . Sincerely, 
 
 
Kwame Martin MD

## 2018-09-04 NOTE — TELEPHONE ENCOUNTER
----- Message from Grant Ang MD sent at 9/3/2018 10:17 PM EDT -----  HgbA1c was much better. CBC is stable.

## 2018-09-12 ENCOUNTER — TELEPHONE (OUTPATIENT)
Dept: FAMILY MEDICINE CLINIC | Age: 83
End: 2018-09-12

## 2018-09-12 DIAGNOSIS — E04.1 THYROID NODULE: Primary | ICD-10-CM

## 2018-09-12 NOTE — TELEPHONE ENCOUNTER
. Alfonzo Valentin called wondering about the results from her recent Ultrasound. I informed her that her results will be reviewed by Dr. Rosa Acuna, and his nurse or himself will go over the results with her as soon as they can.

## 2018-09-12 NOTE — PROGRESS NOTES
Thank you for calling for the result. Looked up 9/6/18 thyroid scan in care everywhere. It shows:  \"Interval increase in size of right thyroid nodule (2.4 cm). Although this nodule was apparently sampled in 2009 with benign histology, suggest repeat ultrasound guided fine-needle aspiration due to its increase in size and appearance\". Therefore, will refer to Dr. Letty Clement, who did your last biopsy. Referral written.

## 2018-09-16 NOTE — TELEPHONE ENCOUNTER
9/6/18 US of thyroid result shows: Interval increase in size of right thyroid nodule- Moderately Suspicious. Repeat ultrasound guided fine-needle aspiration is advised. Referral is already written for Dr. Arcelia Richter who did your last biopsy on the same nodule few years ago.

## 2018-09-18 ENCOUNTER — TELEPHONE (OUTPATIENT)
Dept: FAMILY MEDICINE CLINIC | Age: 83
End: 2018-09-18

## 2018-09-18 NOTE — TELEPHONE ENCOUNTER
I spoke to Prakash Martinez and relayed Dr. Jared Stephens message regarding a F/u US with fine needle biopsy. P.O.C. And phone number provided to Mrs. Tejeda for her to contact. She was advised that any further questions that she had regarding her recent lab work would be answered during her next [address] scheduled for 03 Dec 2018.

## 2018-10-03 NOTE — TELEPHONE ENCOUNTER
Pt called because she took the wrong dosage of her metFormin and she's completely out but it's too early for a refill. Pt stated that she has always taken 3 per day and the prescription says 1 pill twice a day with her meals. She wasn't aware of the change. Please advise.

## 2018-10-04 DIAGNOSIS — E11.9 TYPE 2 DIABETES MELLITUS WITHOUT COMPLICATION, WITHOUT LONG-TERM CURRENT USE OF INSULIN (HCC): ICD-10-CM

## 2018-10-04 RX ORDER — METFORMIN HYDROCHLORIDE 500 MG/1
500 TABLET ORAL
Qty: 270 TAB | Refills: 0 | Status: SHIPPED | OUTPATIENT
Start: 2018-10-04 | End: 2018-10-05 | Stop reason: SDUPTHER

## 2018-10-04 NOTE — TELEPHONE ENCOUNTER
It has been ordered twice daily since a year ago. I do not see in record where it was TID! However, since she is taking TID and not having any problems, will continue TID for now. Rx redone.

## 2018-10-05 DIAGNOSIS — E11.9 TYPE 2 DIABETES MELLITUS WITHOUT COMPLICATION, WITHOUT LONG-TERM CURRENT USE OF INSULIN (HCC): ICD-10-CM

## 2018-10-05 RX ORDER — METFORMIN HYDROCHLORIDE 500 MG/1
500 TABLET ORAL
Qty: 270 TAB | Refills: 0 | Status: SHIPPED | OUTPATIENT
Start: 2018-10-05 | End: 2018-12-03 | Stop reason: SDUPTHER

## 2018-10-05 NOTE — TELEPHONE ENCOUNTER
Patient will need a hardcopy for the prescription for her metformin due to the network with the DOD being down. Forwarding to [employer].

## 2018-10-05 NOTE — TELEPHONE ENCOUNTER
Requested Prescriptions     Pending Prescriptions Disp Refills    metFORMIN (GLUCOPHAGE) 500 mg tablet 270 Tab 0     Sig: Take 1 Tab by mouth three (3) times daily (with meals).  Indications: type 2 diabetes mellitus

## 2018-11-19 DIAGNOSIS — E04.1 THYROID NODULE: ICD-10-CM

## 2018-12-03 ENCOUNTER — HOSPITAL ENCOUNTER (OUTPATIENT)
Dept: LAB | Age: 83
Discharge: HOME | End: 2018-12-03
Payer: MEDICARE

## 2018-12-03 ENCOUNTER — OFFICE VISIT (OUTPATIENT)
Dept: FAMILY MEDICINE CLINIC | Age: 83
End: 2018-12-03

## 2018-12-03 VITALS
TEMPERATURE: 98.5 F | BODY MASS INDEX: 28.56 KG/M2 | DIASTOLIC BLOOD PRESSURE: 78 MMHG | OXYGEN SATURATION: 98 % | WEIGHT: 182 LBS | HEART RATE: 89 BPM | SYSTOLIC BLOOD PRESSURE: 128 MMHG | RESPIRATION RATE: 17 BRPM | HEIGHT: 67 IN

## 2018-12-03 DIAGNOSIS — Z71.89 ACP (ADVANCE CARE PLANNING): ICD-10-CM

## 2018-12-03 DIAGNOSIS — Z00.00 ENCOUNTER FOR MEDICARE ANNUAL WELLNESS EXAM: Primary | ICD-10-CM

## 2018-12-03 DIAGNOSIS — E11.9 TYPE 2 DIABETES MELLITUS WITHOUT COMPLICATION, WITHOUT LONG-TERM CURRENT USE OF INSULIN (HCC): ICD-10-CM

## 2018-12-03 DIAGNOSIS — I10 ESSENTIAL HYPERTENSION: ICD-10-CM

## 2018-12-03 DIAGNOSIS — E78.5 HYPERLIPIDEMIA, UNSPECIFIED HYPERLIPIDEMIA TYPE: ICD-10-CM

## 2018-12-03 LAB
ALT SERPL-CCNC: 21 U/L (ref 13–56)
ANION GAP SERPL CALC-SCNC: 8 MMOL/L (ref 3–18)
AST SERPL-CCNC: 15 U/L (ref 15–37)
BUN SERPL-MCNC: 14 MG/DL (ref 7–18)
BUN/CREAT SERPL: 17 (ref 12–20)
CALCIUM SERPL-MCNC: 9.6 MG/DL (ref 8.5–10.1)
CHLORIDE SERPL-SCNC: 104 MMOL/L (ref 100–108)
CO2 SERPL-SCNC: 30 MMOL/L (ref 21–32)
CREAT SERPL-MCNC: 0.84 MG/DL (ref 0.6–1.3)
GLUCOSE SERPL-MCNC: 117 MG/DL (ref 74–99)
POTASSIUM SERPL-SCNC: 4.4 MMOL/L (ref 3.5–5.5)
SODIUM SERPL-SCNC: 142 MMOL/L (ref 136–145)

## 2018-12-03 PROCEDURE — 84460 ALANINE AMINO (ALT) (SGPT): CPT

## 2018-12-03 PROCEDURE — 84450 TRANSFERASE (AST) (SGOT): CPT

## 2018-12-03 PROCEDURE — 82043 UR ALBUMIN QUANTITATIVE: CPT

## 2018-12-03 PROCEDURE — 36415 COLL VENOUS BLD VENIPUNCTURE: CPT

## 2018-12-03 PROCEDURE — 80048 BASIC METABOLIC PNL TOTAL CA: CPT

## 2018-12-03 RX ORDER — METFORMIN HYDROCHLORIDE 500 MG/1
500 TABLET ORAL 2 TIMES DAILY WITH MEALS
Qty: 180 TAB | Refills: 3
Start: 2018-12-03 | End: 2019-01-18 | Stop reason: SDUPTHER

## 2018-12-03 NOTE — ACP (ADVANCE CARE PLANNING)
Advance Care Planning (ACP) Provider Note - Comprehensive     Date of ACP Conversation: 12/03/18  Persons included in Conversation:  patient  Length of ACP Conversation in minutes:  20 minutes    Authorized Decision Maker (If the patient is incapable of making informed decisions): N/A          The following General ACP was discussed with:patient     - Importance of advance care planning, including choosing a healthcare agent to  communicate patient's healthcare decisions if patient lost the ability to make decisions, such as after a sudden illness or accident. - Understanding of the healthcare agent role was assessed and information provided. - Exploration of values, goals, and preferences if recovery is not expected, even with continued medical treatment in the event of: Imminent death and/or Severe permanent brain injury. \"In these circumstances, what matters most to you? \":  Care focused more on comfort or quality of life. \"What, if any, treatments would you want to avoid? \": Life Prolonging Measures    - Opportunity offered to explore how cultural, Nondenominational, spiritual, or personal beliefs would affect decisions for future care. Review of Existing Advance Directive:    \"What information were you given about medical decisions to consider before completing your advance directive? \": Do not remember; done by an  several years ago. \"What is your understanding of your agent's willingness to honor your wishes, even if he/she may not agree with them? \": will honor  \"Does this advance directive still reflect your preferences? \": Yes    New form in updating given to patient. For Serious or Chronic Illness: The following items were discussed with the patient who verbalized understanding:    - Understanding of medical condition.      - Understanding of CPR, goals and expected outcomes, benefits and burdens discussed.     - Information on CPR success rates provided (e.g. for CPR in hospital, survival to d/c at two weeks is 22%, for chronically ill or elderly/frail survival is less than 3%); the patient was asked to communicate understanding of information in his/her own words. - Explored fears and concerns regarding CPR or possible outcomes    Interventions Provided:  Recommended completion of Advance Directive form after review of ACP materials and conversation with prospective healthcare agent. Recommended communicating the plan and making copies for the healthcare agent, personal physician, and others as appropriate. Recommended review of completed ACP document annually or upon change in health status. Reviewed existing Advance Directive. Summary:  The patient was advised that:  She may like to appoint a person as her medical decision maker in the event that she can no longer do so. She may appoint a secondary person also. She is encouraged to make decision at this time re: if she wants life prolonging measures in the event-     A) her death is imminent and medical treatment will not help her recover. B) her condition makes her unaware of herself or her surroundings and she cannot interact with others. Patient would like to appoint her Sharon Nick as her medical decision maker in the event that she can no longer do so. Phone number is [phone]. Her secondary person is her Melanie Face. Phone number is [phone]. If her death is imminent and medical treatment will not help her recover, the patient does not want life prolonging measures. If her condition makes her unaware of self or surroundings and she cannot interact with others, the patient does not want life prolonging measures. She is encouraged bring her advance directive to the next visit.

## 2018-12-03 NOTE — PROGRESS NOTES
Leidy White is a 81 y.o. female presents to office for dm, cholesterol and htn Medication list has been reviewed with Leidy White and updated as of today's date Health Maintenance items with a due date reviewed with patient: 
Health Maintenance Due Topic Date Due  Shingrix Vaccine Age 50> (1 of 2) 10/10/1986  Influenza Age 5 to Adult  08/01/2018  
 FOOT EXAM Q1  11/24/2018  MEDICARE YEARLY EXAM  11/25/2018  MICROALBUMIN Q1  11/27/2018

## 2018-12-03 NOTE — PATIENT INSTRUCTIONS
Schedule of Personalized Health Plan for [facility] (Provide Copy to Patient) 12/03/18 The best way to stay healthy is to live a healthy lifestyle. A healthy lifestyle includes regular exercise, eating a well-balanced diet, keeping a healthy weight and not smoking. Regular physical exams and screening tests are another important way to take care of yourself. Preventive exams provided by health care providers can find health problems early when treatment works best and can keep you from getting certain diseases or illnesses. Preventive services include exams, lab tests, screenings, shots, monitoring and information to help you take care of your own health. All people over 65 should have a pneumonia shot. Pneumonia shots are usually only needed once in a lifetime unless your doctor decides differently. All people over 65 should have a yearly flu shot. People over 65 are at medium to high risk for Hepatitis B. Three shots are needed for complete protection. In addition to your physical exam, some screening tests are recommended: 
 
 
Screening for Breast Cancer is recommended yearly with a mammogram. 
 
Screening for Cervical Cancer is recommended every two years (annually for certain risk factors, such as previous history of STD or abnormal PAP in past 7 years), with a Pelvic Exam with PAP 
 
 Here is a list of your current Health Maintenance items with a due date: 
Health Maintenance Topic Date Due  Shingrix Vaccine Age 50> (1 of 2) 10/10/1986  Influenza Age 5 to Adult  08/01/2018  MEDICARE YEARLY EXAM  11/25/2018  MICROALBUMIN Q1  11/27/2018  HEMOGLOBIN A1C Q6M  02/28/2019  LIPID PANEL Q1  05/31/2019  
 EYE EXAM RETINAL OR DILATED Q1  11/06/2019  
 FOOT EXAM Q1  12/03/2019  GLAUCOMA SCREENING Q2Y  11/06/2020  
 DTaP/Tdap/Td series (2 - Td) 09/25/2024  Bone Densitometry (Dexa) Screening  Completed  Pneumococcal 65+ Low/Medium Risk  Completed Preventing Falls: Care Instructions Your Care Instructions Getting around your home safely can be a challenge if you have injuries or health problems that make it easy for you to fall. Loose rugs and furniture in walkways are among the dangers for many older people who have problems walking or who have poor eyesight. People who have conditions such as arthritis, osteoporosis, or dementia also have to be careful not to fall. You can make your home safer with a few simple measures. Follow-up care is a key part of your treatment and safety. Be sure to make and go to all appointments, and call your doctor if you are having problems. It's also a good idea to know your test results and keep a list of the medicines you take. How can you care for yourself at home? Taking care of yourself · You may get dizzy if you do not drink enough water. To prevent dehydration, drink plenty of fluids, enough so that your urine is light yellow or clear like water. Choose water and other caffeine-free clear liquids. If you have kidney, heart, or liver disease and have to limit fluids, talk with your doctor before you increase the amount of fluids you drink. · Exercise regularly to improve your strength, muscle tone, and balance. Walk if you can. Swimming may be a good choice if you cannot walk easily. · Have your vision and hearing checked each year or any time you notice a change. If you have trouble seeing and hearing, you might not be able to avoid objects and could lose your balance. · Know the side effects of the medicines you take. Ask your doctor or pharmacist whether the medicines you take can affect your balance. Sleeping pills or sedatives can affect your balance. · Limit the amount of alcohol you drink. Alcohol can impair your balance and other senses. · Ask your doctor whether calluses or corns on your feet need to be removed. If you wear loose-fitting shoes because of calluses or corns, you can lose your balance and fall. · Talk to your doctor if you have numbness in your feet. Preventing falls at home · Remove raised doorway thresholds, throw rugs, and clutter. Repair loose carpet or raised areas in the floor. · Move furniture and electrical cords to keep them out of walking paths. · Use nonskid floor wax, and wipe up spills right away, especially on ceramic tile floors. · If you use a walker or cane, put rubber tips on it. If you use crutches, clean the bottoms of them regularly with an abrasive pad, such as steel wool. · Keep your house well lit, especially Edmundo Jerry, and outside walkways. Use night-lights in areas such as hallways and bathrooms. Add extra light switches or use remote switches (such as switches that go on or off when you clap your hands) to make it easier to turn lights on if you have to get up during the night. · Install sturdy handrails on stairways. · Move items in your cabinets so that the things you use a lot are on the lower shelves (about waist level). · Keep a cordless phone and a flashlight with new batteries by your bed. If possible, put a phone in each of the main rooms of your house, or carry a cell phone in case you fall and cannot reach a phone. Or, you can wear a device around your neck or wrist. You push a button that sends a signal for help. · Wear low-heeled shoes that fit well and give your feet good support. Use footwear with nonskid soles. Check the heels and soles of your shoes for wear. Repair or replace worn heels or soles. · Do not wear socks without shoes on wood floors. · Walk on the grass when the sidewalks are slippery. If you live in an area that gets snow and ice in the winter, sprinkle salt on slippery steps and sidewalks. Preventing falls in the bath · Install grab bars and nonskid mats inside and outside your shower or tub and near the toilet and sinks. · Use shower chairs and bath benches. · Use a hand-held shower head that will allow you to sit while showering. · Get into a tub or shower by putting the weaker leg in first. Get out of a tub or shower with your strong side first. 
· Repair loose toilet seats and consider installing a raised toilet seat to make getting on and off the toilet easier. · Keep your bathroom door unlocked while you are in the shower. Where can you learn more? Go to http://yusraActicut Internationalmaria l.info/. Enter [id number] [id number] in the search box to learn more about \"Preventing Falls: Care Instructions. \" Current as of: May 12, 2017 Content Version: 11.4 © 5582-6148 [employer]. Care instructions adapted under license by [employer] (which disclaims liability or warranty for this information). If you have questions about a medical condition or this instruction, always ask your healthcare professional. [place] [address] any warranty or liability for your use of this information. Preventing Outdoor Falls: Care Instructions Your Care Instructions Worries about falls don't need to keep you indoors. Outdoor activities like walking have big benefits for your health. You will need to watch your step and learn a few safety measures.  
If you are worried about having a fall outdoors, ask your doctor about exercises, classes, or physical therapy that may help. You can learn ways to gain strength, flexibility, and balance. Ask if it might help to use a cane or walker. You can make your time outdoors safer with a few simple measures. Follow-up care is a key part of your treatment and safety. Be sure to make and go to all appointments, and call your doctor if you are having problems. It's also a good idea to know your test results and keep a list of the medicines you take. How can you prevent falls outdoors? · Wear shoes with firm soles and low heels. If you have to walk on an icy surface, use grippers that can be worn over your shoes in bad weather. · Be extra careful if weather is bad. Walk on the grass when the sidewalks are slick. If you live in a place that gets snow and ice in the winter, sprinkle salt on slippery stairs and sidewalks. · Be careful getting on or off buses and trains or getting in and out of cars. If handrails are available, use them. · Be careful when you cross the street. Look for crosswalks or places where curb cuts or ramps are present. · Try not to hurry, especially if you are carrying something. · Be cautious in parking lots or garages. There may be curbs or changes in pavement, or the height of the pavement may vary. · Make sure to wear the correct eyeglasses, if you need them. Reading glasses or bifocals can make it harder to see hazards that might be in your way. · If you are walking outdoors for exercise, try to: 
¨ Walk in well-lighted, well-maintained areas. These include high school or college tracks, shopping malls, and public spaces. ¨ Walk with a partner. ¨ Watch out for cracked sidewalks, curbs, changes in the height of the pavement, exposed tree roots, and debris such as fallen leaves or branches. Where can you learn more? Go to http://yusra-maria l.info/.  
Enter Y536 in the search box to learn more about \"Preventing Outdoor Falls: Care Instructions. \" Current as of: May 12, 2017 Content Version: 11.4 © 7639-2269 [employer]. Care instructions adapted under license by [employer] (which disclaims liability or warranty for this information). If you have questions about a medical condition or this instruction, always ask your healthcare professional. [address] any warranty or liability for your use of this information. How to Get Up Safely After a Fall: Care Instructions Your Care Instructions If you have injuries, health problems, or other reasons that may make it easy for you to fall at home, it is a good idea to learn how to get up safely after a fall. Learning how to get up correctly can help you avoid making an injury worse. Also, knowing what to do if you cannot get up can help you stay safe until help arrives. Follow-up care is a key part of your treatment and safety. Be sure to make and go to all appointments, and call your doctor if you are having problems. It's also a good idea to know your test results and keep a list of the medicines you take. How can you care for yourself after a fall? If you think you can get up First lie still for a few minutes and think about how you feel. If your body feels okay and you think you can get up safely, follow the rest of the steps below: 1. Look for a chair or other piece of furniture that is close to you. 2. Roll onto your side and rest. Roll by turning your head in the direction you want to roll, move your shoulder and arm, then hip and leg in the same direction. 3. Lie still for a moment to let your blood pressure adjust. 
4. Slowly push your upper body up, lift your head, and take a moment to rest. 
5. Slowly get up on your hands and knees, and crawl to the chair or other stable piece of furniture. 6. Put your hands on the chair. 7. Move one foot forward, and place it flat on the floor.  Your other leg should be bent with the knee on the floor. 8. Rise slowly, turn your body, and sit in the chair. Stay seated for a bit and think about how you feel. Call for help. Even if you feel okay, let someone know what happened to you. You might not know that you have a serious injury. If you cannot get up 1. If you think you are injured after a fall or you cannot get up, try not to panic. 2. Call out for help. 3. If you have a phone within reach or you have an emergency call device, use it to call for help. 4. If you do not have a phone within reach, try to slide yourself toward it. If you cannot get to the phone, try to slide toward a door or window or a place where you think you can be heard. 5. [place] or use an object to make noise so someone might hear you. 6. If you can reach something that you can use for a pillow, place it under your head. Try to stay warm by covering yourself with a blanket or clothing while you wait for help. When should you call for help? Call 911 anytime you think you may need emergency care. For example, call if: 
? · You passed out (lost consciousness). ? · You cannot get up after a fall. ? · You have severe pain. ?Call your doctor now or seek immediate medical care if: 
? · You have new or worse pain. ? · You are dizzy or lightheaded. ? · You hit your head. ? Watch closely for changes in your health, and be sure to contact your doctor if: 
? · You do not get better as expected. Where can you learn more? Go to http://yusra-maria l.info/. Enter C390 in the search box to learn more about \"How to Get Up Safely After a Fall: Care Instructions. \" Current as of: May 12, 2017 Content Version: 11.4 © 6531-2860 [employer]. Care instructions adapted under license by [employer] (which disclaims liability or warranty for this information).  If you have questions about a medical condition or this instruction, always ask your healthcare professional. [address] any warranty or liability for your use of this information. Advance Care Planning: Care Instructions Your Care Instructions It can be hard to live with an illness that cannot be cured. But if your health is getting worse, you may want to make decisions about end-of-life care. Planning for the end of your life does not mean that you are giving up. It is a way to make sure that your wishes are met. Clearly stating your wishes can make it easier for your loved ones. Making plans while you are still able may also ease your mind and make your final days less stressful and more meaningful. Follow-up care is a key part of your treatment and safety. Be sure to make and go to all appointments, and call your doctor if you are having problems. It's also a good idea to know your test results and keep a list of the medicines you take. What can you do to plan for the end of life? · You can bring these issues up with your doctor. You do not need to wait until your doctor starts the conversation. You might start with \"I would not be willing to live with . Junius El Junius El Junius El \" When you complete this sentence it helps your doctor understand your wishes. · Talk openly and honestly with your doctor. This is the best way to understand the decisions you will need to make as your health changes. Know that you can always change your mind. · Ask your doctor about commonly used life-support measures. These include tube feedings, breathing machines, and fluids given through a vein (IV). Understanding these treatments will help you decide whether you want them. · You may choose to have these life-supporting treatments for a limited time. This allows a trial period to see whether they will help you. You may also decide that you want your doctor to take only certain measures to keep you alive.  It is important to spell out these conditions so that your doctor and family understand them. · Talk to your doctor about how long you are likely to live. He or she may be able to give you an idea of what usually happens with your specific illness. · Think about preparing papers that state your wishes. This way there will not be any confusion about what you want. You can change your instructions at any time. Which papers should you prepare? Advance directives are legal papers that tell doctors how you want to be cared for at the end of your life. You do not need a  to write these papers. Ask your doctor or your [facility] department for information on how to write your advance directives. They may have the forms for each of these types of papers. Make sure your doctor has a copy of these on file, and give a copy to a family member or close friend. · Consider a do-not-resuscitate order (DNR). This order asks that no extra treatments be done if your heart stops or you stop breathing. Extra treatments may include cardiopulmonary resuscitation (CPR), electrical shock to restart your heart, or a machine to breathe for you. If you decide to have a DNR order, ask your doctor to explain and write it. Place the order in your home where everyone can easily see it. · Consider a living will. A living will explains your wishes about life support and other treatments at the end of your life if you become unable to speak for yourself. Living reyes tell doctors to use or not use treatments that would keep you alive. You must have one or two witnesses or a notary present when you sign this form. · Consider a durable power of  for health care. This allows you to name a person to make decisions about your care if you are not able to. Most people ask a close friend or family member. Talk to this person about the kinds of treatments you want and those that you do not want. Make sure this person understands your wishes. These legal papers are simple to change. Tell your doctor what you want to change, and ask him or her to make a note in your medical file. Give your family updated copies of the papers. Where can you learn more? Go to http://yusra-maria l.info/. Enter P184 in the search box to learn more about \"Advance Care Planning: Care Instructions. \" Current as of: September 24, 2016 Content Version: 11.4 © 5021-2334 [employer]. Care instructions adapted under license by [employer] (which disclaims liability or warranty for this information). If you have questions about a medical condition or this instruction, always ask your healthcare professional. [address] any warranty or liability for your use of this information. Learning About Durable Power of  for Health Care What is a durable power of  for health care? A durable power of  for health care is one type of the legal forms called advance directives. It lets you decide who you want to make treatment decisions for you if you cannot speak or decide for yourself. The person you choose is called your health care agent. Another type of advance directive is a living will. It lets you write down what kinds of treatment or life support you want or do not want. What should you think about when choosing a health care agent? Choose your health care agent carefully. This person may or may not be a family member. Talk to the person before you make your final decision. Make sure he or she is comfortable with this responsibility. It's a good idea to choose someone who: · Is at least 25years old. · Knows you well and understands what makes life meaningful for you. · Understands your Muslim and moral values. · Will do what you want, not what he or she wants. · Will be able to make difficult choices at a stressful time. · Will be able to refuse or stop treatment, if that is what you would want, even if you could die. · Will be firm and confident with health professionals if needed. · Will ask questions to get necessary information. · Lives near you or agrees to travel to you if needed. Your family may help you make medical decisions while you can still be part of that process. But it is important to choose one person to be your health care agent in case you are not able to make decisions for yourself. If you don't fill out the legal form and name a health care agent, the decisions your family can make may be limited. Who will make decisions for you if you do not have a health care agent? If you don't have a health care agent or a living will, your family members may disagree about your medical care. And then some medical professionals who may not know you as well might have to make decisions for you. In some cases, a  makes the decisions. When you name a health care agent, it is very clear who has the power to make health decisions for you. How do you name a health care agent? You name your health care agent on a legal form. It is usually called a durable power of  for health care. Ask your hospital, state bar association, or office on aging where to find these forms. You must sign the form to make it legal. Some states require you to get the form notarized. This means that a person called a  watches you sign the form and then he or she signs the form. Some states also require that two or more witnesses sign the form. Be sure to tell your family members and doctors who your health care agent is. Keep your forms in a safe place. But make sure that your loved ones know where the forms are. This could be in your desk where you keep other important papers. Make sure your doctor has a copy of your forms. Where can you learn more? Go to http://yusra-maria l.info/. Enter [id number] in the search box to learn more about \"Learning About Durable Power of  for [facility]. \" Current as of: September 24, 2016 Content Version: 11.4 © 4248-9768 [employer] Care instructions adapted under license by [employer] (which disclaims liability or warranty for this information). If you have questions about a medical condition or this instruction, always ask your healthcare professional. [facility] [address] any warranty or liability for your use of this information. Deciding About Life-Prolonging Treatment Deciding About Life-Prolonging Treatment What is life-prolonging treatment? There are many kinds of treatment that can help you live longer. These may be needed for only a short time until your illness improves. Or you may use them over the long term to help keep you alive. Some treatments include the use of: · Medicines to slow the progress of certain diseases, such as heart disease, diabetes, cancer, AIDS, or Alzheimer's disease. · Antibiotics to treat serious infections, such as pneumonia. · Dialysis to clean your blood if your kidneys stop working. · A breathing machine to help you breathe if you can't breathe on your own. This machine pumps air into your lungs through a tube put into your throat. · A feeding tube or an intravenous (IV) line to give you food and fluids if you can't eat or drink. · Cardiopulmonary resuscitation (CPR) to try to restart your heart. The decision to receive treatments that may help you live longer is a personal one. You may want your doctor to do everything possible to keep you alive, even when your chance for recovery is small. Or you may choose to only have care to manage your pain and other symptoms. What are key points about this decision? · If there is a good chance that your illness can be cured or managed, your doctor may advise you to first try available treatments.  If these don't work, then you might think about stopping treatment. · If you stop treatment, you will still receive care that focuses on pain relief and comfort. · A decision to stop treatment that keeps you alive does not have to be permanent. You can always change your mind if your health starts to improve. · Even though treatment focuses on helping you live longer, it may cause side effects that can greatly affect your quality of life. And it could affect how you spend time with your family and friends. · If you still have personal goals that you want to pursue, you may want treatment that keeps you alive long enough to reach them. Why might you choose life-prolonging treatment? · There is a good chance that your illness can be cured or managed. · You think you can manage the possible side effects of treatment. · You don't think treatment will get in the way of your quality of life. · You have personal goals that you still want to pursue and achieve. Why might you choose to stop life-prolonging treatment? · Your chance of surviving your illness is very low. · You have tried all possible treatments for your illness, but they have not helped. · You can no longer deal with the side effects of treatment. · You have already met the goals you set out to achieve in your life. Your decision Thinking about the facts and your feelings can help you make a decision that is right for you. Be sure you understand the benefits and risks of your options, and think about what else you need to do before you make the decision. Where can you learn more? Go to http://yusra-maria l.info/. Enter D061 in the search box to learn more about \"Deciding About Life-Prolonging Treatment. \" Current as of: September 24, 2016 Content Version: 11.4 © 0749-1606 [employer].  Care instructions adapted under license by [employer] (which disclaims liability or warranty for this information). If you have questions about a medical condition or this instruction, always ask your healthcare professional. [place] [id number] any warranty or liability for your use of this information. Vickey Bennett 1721 What is a living will? A living will is a legal form you use to write down the kind of care you want at the end of your life. It is used by the health professionals who will treat you if you aren't able to decide for yourself. If you put your wishes in writing, your loved ones and others will know what kind of care you want. They won't need to guess. This can ease your mind and be helpful to others. A living will is not the same as an estate or property will. An estate will explains what you want to happen with your money and property after you die. Is a living will a legal document? A living will is a legal document. Each state has its own laws about living reyes. If you move to another state, make sure that your living will is legal in the state where you now live. Or you might use a universal form that has been approved by many states. This kind of form can sometimes be completed and stored online. Your electronic copy will then be available wherever you have a connection to the Internet. In most cases, doctors will respect your wishes even if you have a form from a different state. · You don't need an  to complete a living will. But legal advice can be helpful if your state's laws are unclear, your health history is complicated, or your family can't agree on what should be in your living will. · You can change your living will at any time. Some people find that their wishes about end-of-life care change as their health changes. · In addition to making a living will, think about completing a medical power of  form.  This form lets you name the person you want to make end-of-life treatment decisions for you (your \"health care agent\") if you're not able to. Many hospitals and nursing homes will give you the forms you need to complete a living will and a medical power of . · Your living will is used only if you can't make or communicate decisions for yourself anymore. If you become able to make decisions again, you can accept or refuse any treatment, no matter what you wrote in your living will. · Your state may offer an online registry. This is a place where you can store your living will online so the doctors and nurses who need to treat you can find it right away. What should you think about when creating a living will? Talk about your end-of-life wishes with your family members and your doctor. Let them know what you want. That way the people making decisions for you won't be surprised by your choices. Think about these questions as you make your living will: · Do you know enough about life support methods that might be used? If not, talk to your doctor so you know what might be done if you can't breathe on your own, your heart stops, or you're unable to swallow. · What things would you still want to be able to do after you receive life-support methods? Would you want to be able to walk? To speak? To eat on your own? To live without the help of machines? · If you have a choice, where do you want to be cared for? In your home? At a hospital or nursing home? · Do you want certain Sabianism practices performed if you become very ill? · If you have a choice at the end of your life, where would you prefer to die? At home? In a hospital or nursing home? Somewhere else? · Would you prefer to be buried or cremated? · Do you want your organs to be donated after you die? What should you do with your living will? · Make sure that your family members and your health care agent have copies of your living will. · Give your doctor a copy of your living will to keep in your medical record. If you have more than one doctor, make sure that each one has a copy. · You may want to put a copy of your living will where it can be easily found. Where can you learn more? Go to http://yusra-maria l.info/. Enter A089 in the search box to learn more about \"Learning About Living [place]. \" Current as of: September 24, 2016 Content Version: 11.4 © 1752-7934 [employer]. Care instructions adapted under license by [employer] (which disclaims liability or warranty for this information). If you have questions about a medical condition or this instruction, always ask your healthcare professional. [address] any warranty or liability for your use of this information. Advance Directives: Care Instructions Your Care Instructions An advance directive is a legal way to state your wishes at the end of your life. It tells your family and your doctor what to do if you can no longer say what you want. There are two main types of advance directives. You can change them any time that your wishes change. · A living will tells your family and your doctor your wishes about life support and other treatment. · A durable power of  for health care lets you name a person to make treatment decisions for you when you can't speak for yourself. This person is called a health care agent. If you do not have an advance directive, decisions about your medical care may be made by a doctor or a  who doesn't know you. It may help to think of an advance directive as a gift to the people who care for you. If you have one, they won't have to make tough decisions by themselves. Follow-up care is a key part of your treatment and safety.  Be sure to make and go to all appointments, and call your doctor if you are having problems. It's also a good idea to know your test results and keep a list of the medicines you take. How can you care for yourself at home? · Discuss your wishes with your loved ones and your doctor. This way, there are no surprises. · Many states have a unique form. Or you might use a universal form that has been approved by many states. This kind of form can sometimes be completed and stored online. Your electronic copy will then be available wherever you have a connection to the Internet. In most cases, doctors will respect your wishes even if you have a form from a different state. · You don't need a  to do an advance directive. But you may want to get legal advice. · Think about these questions when you prepare an advance directive: ¨ Who do you want to make decisions about your medical care if you are not able to? Many people choose a family member or close friend. ¨ Do you know enough about life support methods that might be used? If not, talk to your doctor so you understand. ¨ What are you most afraid of that might happen? You might be afraid of having pain, losing your independence, or being kept alive by machines. ¨ Where would you prefer to die? Choices include your home, a hospital, or a nursing home. ¨ Would you like to have information about hospice care to support you and your family? ¨ Do you want to donate organs when you die? ¨ Do you want certain Oriental orthodox practices performed before you die? If so, put your wishes in the advance directive. · Read your advance directive every year, and make changes as needed. When should you call for help? Be sure to contact your doctor if you have any questions. Where can you learn more? Go to http://yusra-maria l.info/. Enter R264 in the search box to learn more about \"Advance Directives: Care Instructions. \" Current as of: September 24, 2016 Content Version: 11.4 © 3499-7322 [employer]. Care instructions adapted under license by [employer] (which disclaims liability or warranty for this information). If you have questions about a medical condition or this instruction, always ask your healthcare professional. [address] any warranty or liability for your use of this information.

## 2018-12-03 NOTE — PROGRESS NOTES
SUBJECTIVE Chief Complaint Patient presents with  Hypertension  Diabetes  Cholesterol Problem HPI:  
 
Her BP is being treated with amlodipine, losartan and hydrochlorothiazide Her glucose is running good with life style and Glucophage. No hypoglycemia symptoms. Her lipids are being treated. She wants to stop Lipitor because it is causing muscle ache and stiffness. She feels better when she does not take. Today she feels fine because she did not take it for last few days. She is following diet for lipids and DM. She has history of right thyroid nodule which has been biopsied and was found to be benign. She feels that it may be getting a little little larger in recent years. She has recurrent dysbalance / dizziness. She has seen ENT. Her problem with balance or vertigo is now rare. She uses as needed Antivert with good results. Her recurrent pain in right trapezius area is better with ROM. It is improving with neck range of motion exercises and posterior care. No other Systemic, Cardiovascular or Respiratory symptoms. PMH: Hypertension 2006, DM II 2005, Hyperlipidemia, BRIAN, Chronic Allergic Rhinitis, Iron and Folate Deficiency Anemia, Glaucoma, Thyroid Nodules (neg FNA), Seborrheic Karatosis, Cataract Surgery 2012, Hysterectomy 1993 (Fibroids), Bunionectomy 1995, Herniorrhapy 2002, Colonic Polyps (due 2017). Current Outpatient Medications Medication Sig  
 metFORMIN (GLUCOPHAGE) 500 mg tablet Take 1 Tab by mouth three (3) times daily (with meals). Indications: type 2 diabetes mellitus  atorvastatin (LIPITOR) 10 mg tablet Take 1 Tab by mouth daily.  losartan (COZAAR) 100 mg tablet Take 1 Tab by mouth daily.  glucose blood VI test strips (FREESTYLE LITE STRIPS) strip Use as directed to check blood sugar once a day  amLODIPine (NORVASC) 2.5 mg tablet Take 1 Tab by mouth daily.   
 hydroCHLOROthiazide (HYDRODIURIL) 12.5 mg tablet Take 1 Tab by mouth daily. (Patient taking differently: Take 12.5 mg by mouth every other day.)  VOLTAREN 1 % gel  meclizine (ANTIVERT) 25 mg tablet Take 1 Tab by mouth three (3) times daily as needed.  triamcinolone acetonide (KENALOG) 0.1 % topical cream Use as directed.  Blood-Glucose Meter (FREESTYLE LITE METER) monitoring kit Use as directed to check blood sugar once a day  Lancets (FREESTYLE LANCETS) misc Use as directed to check blood sugar once a day.  latanoprost (XALATAN) 0.005 % ophthalmic solution Administer 1 Drop to both eyes nightly. No current facility-administered medications for this visit. Allergies: No known allergy to drugs. ROS:  
Constitutional: No fever, chills, night sweats, malaise. Ear/Nose/Throat: No ear/ throat pain, lesions, unusual discharge, new speaking or hearing problems, nose bleed. Cardiovascular: No angina, palpitations, PND, orthopnea, lightheadedness, edema, claudication. Respiratory: No dyspnea, wheeze, pleurisy, hemoptysis, unusual cough or sputum. Gastrointestinal: No nausea/ vomiting, bowel habit change, pain, ERA symptoms, melena, hematochezia, anorexia. Psychiatric: No agitation, confusion/disorientation, suicidal or homicidal ideation. Musculoskeletal: No focal weakness. No other complaints. OBJECTIVE Constitutional: General Appearance: well developed, overweight, nontoxic, in no acute distress. Visit Vitals /78 Pulse 89 Temp 98.5 °F (36.9 °C) (Oral) Resp 17 Ht 5' 7\" (1.702 m) Wt 182 lb (82.6 kg) SpO2 98% BMI 28.51 kg/m² Pulmonary: Respiratory effort: normal; no dyspnea, no retractions, no accessory muscle use. Auscultation: normal & symmetrical air exchange; no rales, no rhonchi, no wheeze; no rubs Cardiovascular: Palpation: PMI not displaced or enlarged, no thrills or heaves. Auscultation: RRR; no murmur, rubs or gallops. Extremities: no edema, no active varicosity. GI[de-identified] Normal bowel sounds. No masses; upper RUQ tenderness lateral to xyphoid; no rebound/rigidity; no CVA tenderness. Psychiatric[de-identified] Oriented to time, place and person. Normal mood, no agitation or anxiety. Normal affect. Pleasant and cooperative. Neurological[de-identified] CN I to XII: intact. DTR: symmetrical & normal. 
 
Musculoskeletal[de-identified]  
NC/AT,neck is supple. Neck: Tenderness to palpation of right intermediate trapezius is better. Range of motion pain on right is better. Muscles: non tender. No focal weakness. Diabetic foot exam:  
 
Left Foot: 
Visual Exam: normal  S/P bunionectomy Pulse DP: 2+ (normal) Filament test: normal sensation Vibratory sensation: normal 
   
Right Foot: 
Visual Exam: normal  S/P bunionectomy Pulse DP: 2+ (normal) Filament test: normal sensation Vibratory sensation: normal 
 
 
Lab Results Component Value Date/Time WBC 6.0 08/30/2018 11:25 AM  
 HGB 14.1 08/30/2018 11:25 AM  
 HCT 40.4 08/30/2018 11:25 AM  
 PLATELET 312 24/47/7480 11:25 AM  
 MCV 78.0 08/30/2018 11:25 AM  
 
Lab Results Component Value Date/Time Sodium 141 05/31/2018 08:36 AM  
 Potassium 4.2 05/31/2018 08:36 AM  
 Chloride 102 05/31/2018 08:36 AM  
 CO2 31 05/31/2018 08:36 AM  
 Anion gap 8 05/31/2018 08:36 AM  
 Glucose 123 (H) 05/31/2018 08:36 AM  
 BUN 15 05/31/2018 08:36 AM  
 Creatinine 0.76 05/31/2018 08:36 AM  
 BUN/Creatinine ratio 20 05/31/2018 08:36 AM  
 GFR est AA >60 05/31/2018 08:36 AM  
 GFR est non-AA >60 05/31/2018 08:36 AM  
 Calcium 9.6 05/31/2018 08:36 AM  
 Bilirubin, total 0.5 05/31/2018 08:36 AM  
 AST (SGOT) 18 05/31/2018 08:36 AM  
 Alk. phosphatase 86 05/31/2018 08:36 AM  
 Protein, total 7.3 05/31/2018 08:36 AM  
 Albumin 3.7 05/31/2018 08:36 AM  
 Globulin 3.6 05/31/2018 08:36 AM  
 A-G Ratio 1.0 05/31/2018 08:36 AM  
 ALT (SGPT) 21 05/31/2018 08:36 AM  
 
Lab Results Component Value Date/Time  Cholesterol, total 141 05/31/2018 08:36 AM  
 HDL Cholesterol 77 (H) 05/31/2018 08:36 AM  
 LDL, calculated 53 05/31/2018 08:36 AM  
 VLDL, calculated 11 05/31/2018 08:36 AM  
 Triglyceride 55 05/31/2018 08:36 AM  
 CHOL/HDL Ratio 1.8 05/31/2018 08:36 AM  
 
Lab Results Component Value Date/Time Hemoglobin A1c 6.3 (H) 08/30/2018 11:25 AM  
 Hemoglobin A1c (POC) 6.9 02/21/2018 12:10 PM  
  
Lab Results Component Value Date/Time TSH 0.84 05/31/2018 08:36 AM  
 
 
ASSESSMENT 1. Type 2 diabetes mellitus without complication, without long-term current use of insulin ([facility] [address] 2. Hyperlipidemia, unspecified hyperlipidemia type 3. Essential hypertension PLAN Pharmacologic Management: Medications reviewed with the patient. Keep holding Lipitor. Decrease metformin to 500 mg twice daily. Orders Placed This Encounter  METABOLIC PANEL, BASIC  ALT  AST  MICROALBUMIN, UR, RAND W/ MICROALB/CREAT RATIO  
 HM DIABETES FOOT EXAM  
 metFORMIN (GLUCOPHAGE) 500 mg tablet  varicella-zoster recombinant, PF, (SHINGRIX, PF,) 50 mcg/0.5 mL susr injection Continue with neck range of motion exercises, moist heat and posture care. Discussed DDx, follow-up & work-up. Discussed risk/benefit & side effect of treatment. Low Salt ADA diet & graduated excecise. Follow up as planned, prn sooner. Regular BS check at home and notify MD of results prn. Health risk from non adherence discussed. Patent voiced understanding. Follow-up Disposition: 
Return in about 3 months (around 3/3/2019).  
 
Myla Jackson MD

## 2018-12-03 NOTE — PROGRESS NOTES
Ni Martinez is a 81 y.o. female and presents for annual Medicare Wellness Visit. Problem List: Reviewed with patient and discussed risk factors. Patient Active Problem List  
Diagnosis Code  Hypertension I10  
 Diabetes mellitus ([facility] [address] E11.9  
 Encounter to establish care Z76.89  
 Tennis elbow M77.10  Trigger finger M65.30  Tendonitis of elbow, right M77.8  Hyperlipemia E78.5  Hot flashes, menopausal N95.1  Vertigo R42  Chronic allergic rhinitis J30.9  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, [id number] Current medical providers:  Patient Care Team: 
Roc Johnson MD as PCP - General (Family Practice) Charles Huang MD (Cardiology) Aung Woods MD (Ophthalmology) Boris Lamb MD (Dermatology) PMH: Reviewed with patient Past Medical History:  
Diagnosis Date  Anemia  Chronic allergic rhinitis  Diabetes ([facility] [address]  Glaucoma  Hypercholesterolemia  Hypertension  Iron deficiency  Multiple thyroid nodules   
 neg (FNA)  BRIAN (obstructive sleep apnea)  Sleep apnea  Thyroid disease   
 lumps on thyroid  Trigger finger PSH: Reviewed with patient Past Surgical History:  
Procedure Laterality Date  [address]  HX CATARACT REMOVAL    
 correction  HX HERNIA REPAIR  2002  HX HYSTERECTOMY SH: Reviewed with patient Social History Tobacco Use  Smoking status: Former Smoker Packs/day: 0.25 Last attempt to quit: 1970 Years since quittin.9  Smokeless tobacco: Never Used  Tobacco comment: Quit smoking  Substance Use Topics  Alcohol use: No  
 Drug use: No  
 
 
FH: Reviewed with patient History reviewed. No pertinent family history. Medications/Allergies: Reviewed with patient Current Outpatient Medications on File Prior to Visit Medication Sig Dispense Refill  losartan (COZAAR) 100 mg tablet Take 1 Tab by mouth daily. 90 Tab 3  
 glucose blood VI test strips (FREESTYLE LITE STRIPS) strip Use as directed to check blood sugar once a day 100 Strip 3  
 amLODIPine (NORVASC) 2.5 mg tablet Take 1 Tab by mouth daily. 90 Tab 3  
 hydroCHLOROthiazide (HYDRODIURIL) 12.5 mg tablet Take 1 Tab by mouth daily. (Patient taking differently: Take 12.5 mg by mouth every other day.) 90 Tab 3  
 VOLTAREN 1 % gel  meclizine (ANTIVERT) 25 mg tablet Take 1 Tab by mouth three (3) times daily as needed. 30 Tab 0  
 triamcinolone acetonide (KENALOG) 0.1 % topical cream Use as directed.  Blood-Glucose Meter (FREESTYLE LITE METER) monitoring kit Use as directed to check blood sugar once a day 1 Kit 0  
 Lancets (FREESTYLE LANCETS) misc Use as directed to check blood sugar once a day. 100 Each 3  
 latanoprost (XALATAN) 0.005 % ophthalmic solution Administer 1 Drop to both eyes nightly. No current facility-administered medications on file prior to visit. Allergies Allergen Reactions  Latex, Natural Rubber Other (comments)  Adhesive Contact Dermatitis LEAVES DA ON SKIN 
 
USE PAPER TAPE Objective: 
Visit Vitals /78 Pulse 89 Temp 98.5 °F (36.9 °C) (Oral) Resp 17 Ht 5' 7\" (1.702 m) Wt 182 lb (82.6 kg) LMP  (LMP Unknown) SpO2 98% BMI 28.51 kg/m² Body mass index is 28.51 kg/m². Assessment of cognitive impairment:  
Alert and oriented x 3 Oriented to situation? yes Memory Problem? no 
Cognition Problem? no 
 
Depression Screen: PHQ over the last two weeks 5/31/2018 Little interest or pleasure in doing things Not at all Feeling down, depressed, irritable, or hopeless Not at all Total Score PHQ 2 0 Abuse Screen:  
Abuse Screening Questionnaire 2/21/2018 Do you ever feel afraid of your partner? Lupillo Kimbrough Are you in a relationship with someone who physically or mentally threatens you?  Lupillo Kimbrough  
 Is it safe for you to go home? Becca Sim Fall Risk Assessment:   
Fall Risk Assessment, last 12 mths 5/31/2018 Able to walk? Yes Fall in past 12 months? No  
 
 
Functional Ability:  
Does the patient exhibit a steady gait? yes How long did it take the patient to get up and walk from a sitting position? 1 sec Is the patient self reliant?  (ie can do own laundry, meals, household chores)  yes Does the patient handle his/her own medications? yes Does the patient handle his/her own money? yes Is the patients home safe (ie good lighting, handrails on stairs and bath, etc.)? yes Did you notice or did patient express any hearing difficulties? no 
  
Did you notice of did patient express any vision difficulties?   no 
  
Were distance and reading eye charts used? yes Advance Care Planning:  
Patient was offered the opportunity to discuss advance care planning:  yes Does patient have an Advance Directive:  yes If no, did you provide information on Caring Connections? yes Plan:   
 
Orders Placed This Encounter  METABOLIC PANEL, BASIC  ALT  AST  MICROALBUMIN, UR, RAND W/ MICROALB/CREAT RATIO  
  DIABETES FOOT EXAM  
 metFORMIN (GLUCOPHAGE) 500 mg tablet  varicella-zoster recombinant, PF, (SHINGRIX, PF,) 50 mcg/0.5 mL susr injection Health Maintenance Topic Date Due  Shingrix Vaccine Age 50> (1 of 2) 10/10/1986  Influenza Age 5 to Adult  08/01/2018  MEDICARE YEARLY EXAM  11/25/2018  MICROALBUMIN Q1  11/27/2018  HEMOGLOBIN A1C Q6M  02/28/2019  LIPID PANEL Q1  05/31/2019  
 EYE EXAM RETINAL OR DILATED Q1  11/06/2019  
 FOOT EXAM Q1  12/03/2019  GLAUCOMA SCREENING Q2Y  11/06/2020  
 DTaP/Tdap/Td series (2 - Td) 09/25/2024  Bone Densitometry (Dexa) Screening  Completed  Pneumococcal 65+ Low/Medium Risk  Completed She says she has influenza vaccine. *Patient verbalized understanding and agreement with the plan. A copy of the After Visit Summary with personalized health plan was given to the patient today.

## 2018-12-04 LAB
CREAT UR-MCNC: 109 MG/DL (ref 30–125)
MICROALBUMIN UR-MCNC: 0.72 MG/DL (ref 0–3)
MICROALBUMIN/CREAT UR-RTO: 7 MG/G (ref 0–30)

## 2019-01-03 NOTE — TELEPHONE ENCOUNTER
Pt calling to request medication refill of:  Requested Prescriptions     Pending Prescriptions Disp Refills    hydroCHLOROthiazide (HYDRODIURIL) 12.5 mg tablet 90 Tab 3     Sig: Take 1 Tab by mouth daily. be sent to print  Pt has about 7 tabs remaining. Pts last appt was 12/3, next appt sched for 3/4. Advised pt of 72 hour time frame for refill requests. Please advise.

## 2019-01-04 NOTE — TELEPHONE ENCOUNTER
Attempted to call patient to find out if patient is taking one tablet daily or once every other day.

## 2019-01-05 RX ORDER — HYDROCHLOROTHIAZIDE 12.5 MG/1
12.5 TABLET ORAL EVERY OTHER DAY
Qty: 45 TAB | Refills: 0 | Status: SHIPPED | OUTPATIENT
Start: 2019-01-05 | End: 2019-03-26 | Stop reason: SDUPTHER

## 2019-01-18 DIAGNOSIS — E11.9 TYPE 2 DIABETES MELLITUS WITHOUT COMPLICATION, WITHOUT LONG-TERM CURRENT USE OF INSULIN (HCC): ICD-10-CM

## 2019-01-18 NOTE — TELEPHONE ENCOUNTER
Pt did not rcv/d rx dated 12/3/18 - \"no print\". Last appt: 12/3/18  Next appt: 3/4/19  Aware of 72 hr policy    Pt requesting printed rx    Requested Prescriptions     Pending Prescriptions Disp Refills    metFORMIN (GLUCOPHAGE) 500 mg tablet 180 Tab 3     Sig: Take 1 Tab by mouth two (2) times daily (with meals).

## 2019-01-24 RX ORDER — METFORMIN HYDROCHLORIDE 500 MG/1
500 TABLET ORAL 2 TIMES DAILY WITH MEALS
Qty: 180 TAB | Refills: 3 | Status: SHIPPED | OUTPATIENT
Start: 2019-01-24 | End: 2020-01-02 | Stop reason: SDUPTHER

## 2019-03-04 ENCOUNTER — OFFICE VISIT (OUTPATIENT)
Dept: FAMILY MEDICINE CLINIC | Age: 84
End: 2019-03-04

## 2019-03-04 ENCOUNTER — HOSPITAL ENCOUNTER (OUTPATIENT)
Dept: LAB | Age: 84
Discharge: HOME | End: 2019-03-04
Payer: MEDICARE

## 2019-03-04 VITALS
RESPIRATION RATE: 15 BRPM | BODY MASS INDEX: 29.03 KG/M2 | OXYGEN SATURATION: 99 % | DIASTOLIC BLOOD PRESSURE: 82 MMHG | HEART RATE: 85 BPM | SYSTOLIC BLOOD PRESSURE: 142 MMHG | HEIGHT: 67 IN | TEMPERATURE: 97.3 F | WEIGHT: 185 LBS

## 2019-03-04 DIAGNOSIS — E11.9 TYPE 2 DIABETES MELLITUS WITHOUT COMPLICATION, WITHOUT LONG-TERM CURRENT USE OF INSULIN (HCC): ICD-10-CM

## 2019-03-04 DIAGNOSIS — I10 ESSENTIAL HYPERTENSION: ICD-10-CM

## 2019-03-04 DIAGNOSIS — R22.41 MASS OF RIGHT LOWER LEG: ICD-10-CM

## 2019-03-04 DIAGNOSIS — E04.1 THYROID NODULE: ICD-10-CM

## 2019-03-04 DIAGNOSIS — J30.9 CHRONIC ALLERGIC RHINITIS: ICD-10-CM

## 2019-03-04 DIAGNOSIS — E11.9 TYPE 2 DIABETES MELLITUS WITHOUT COMPLICATION, WITHOUT LONG-TERM CURRENT USE OF INSULIN (HCC): Primary | ICD-10-CM

## 2019-03-04 DIAGNOSIS — H81.312 VERTIGO, AURAL, LEFT: ICD-10-CM

## 2019-03-04 DIAGNOSIS — E78.5 HYPERLIPIDEMIA, UNSPECIFIED HYPERLIPIDEMIA TYPE: ICD-10-CM

## 2019-03-04 LAB
EST. AVERAGE GLUCOSE BLD GHB EST-MCNC: 140 MG/DL
HBA1C MFR BLD: 6.5 % (ref 4.2–5.6)

## 2019-03-04 PROCEDURE — 36415 COLL VENOUS BLD VENIPUNCTURE: CPT

## 2019-03-04 PROCEDURE — 83036 HEMOGLOBIN GLYCOSYLATED A1C: CPT

## 2019-03-04 RX ORDER — AMLODIPINE BESYLATE 2.5 MG/1
2.5 TABLET ORAL DAILY
Qty: 90 TAB | Refills: 3 | Status: SHIPPED | OUTPATIENT
Start: 2019-03-04 | End: 2019-09-09 | Stop reason: SDUPTHER

## 2019-03-04 NOTE — PROGRESS NOTES
SUBJECTIVE Chief Complaint Patient presents with  Diabetes  
  follow up  Hypertension  
  follow up HPI:  
 
Her BP is being treated with amlodipine, losartan and hydrochlorothiazide. She says that her BP is high today because she just coming from [facility] for an hour. Her glucose is running good with life style and Glucophage. No hypoglycemia symptoms. She has stopped Lipitor because it is causing muscle ache and stiffness. She feels better now. She is following diet for lipids and DM. She has history of right thyroid nodule which has been biopsied and was found to be benign. She feels that it may be getting a little little larger in recent years. She already has seen surgeon and biopsy was normal. 
 
She has recurrent dysbalance / dizziness. She has seen ENT. Her problem with balance or vertigo is now rare. She uses as needed Antivert with good results. Her rhinitis is controlled. For the last 3 months, she feels a tender small lump on her Rt shin area, which is sometimes painful. No other Systemic, Cardiovascular or Respiratory symptoms. PMH: Hypertension 2006, DM II 2005, Hyperlipidemia, BRIAN, Chronic Allergic Rhinitis, Iron and Folate Deficiency Anemia, Glaucoma, Thyroid Nodules (neg FNA), Seborrheic Karatosis, Cataract Surgery 2012, Hysterectomy 1993 (Fibroids), Bunionectomy 1995, Herniorrhapy 2002, Colonic Polyps (due 2017). Current Outpatient Medications Medication Sig  
 metFORMIN (GLUCOPHAGE) 500 mg tablet Take 1 Tab by mouth two (2) times daily (with meals).  hydroCHLOROthiazide (HYDRODIURIL) 12.5 mg tablet Take 1 Tab by mouth every other day for 90 days.  losartan (COZAAR) 100 mg tablet Take 1 Tab by mouth daily.  glucose blood VI test strips (FREESTYLE LITE STRIPS) strip Use as directed to check blood sugar once a day  amLODIPine (NORVASC) 2.5 mg tablet Take 1 Tab by mouth daily.  meclizine (ANTIVERT) 25 mg tablet Take 1 Tab by mouth three (3) times daily as needed.  Blood-Glucose Meter (FREESTYLE LITE METER) monitoring kit Use as directed to check blood sugar once a day  Lancets (FREESTYLE LANCETS) misc Use as directed to check blood sugar once a day.  latanoprost (XALATAN) 0.005 % ophthalmic solution Administer 1 Drop to both eyes nightly. No current facility-administered medications for this visit. Allergies: No known allergy to drugs. ROS:  
Constitutional: No fever, chills, night sweats, malaise. Ear/Nose/Throat: No ear/ throat pain, lesions, unusual discharge, new speaking or hearing problems, nose bleed. Cardiovascular: No angina, palpitations, PND, orthopnea, lightheadedness, edema, claudication. Respiratory: No dyspnea, wheeze, pleurisy, hemoptysis, unusual cough or sputum. Gastrointestinal: No nausea/ vomiting, bowel habit change, pain, ERA symptoms, melena, hematochezia, anorexia. Psychiatric: No agitation, confusion/disorientation, suicidal or homicidal ideation. Musculoskeletal: No focal weakness. No other complaints. OBJECTIVE Constitutional: General Appearance: well developed, overweight, nontoxic, in no acute distress. Visit Vitals /82 (BP 1 Location: Right arm, BP Patient Position: Sitting) Pulse 85 Temp 97.3 °F (36.3 °C) (Temporal) Resp 15 Ht 5' 7\" (1.702 m) Wt 185 lb (83.9 kg) SpO2 99% BMI 28.98 kg/m² Pulmonary: Respiratory effort: normal; no dyspnea, no retractions, no accessory muscle use. Auscultation: normal & symmetrical air exchange; no rales, no rhonchi, no wheeze; no rubs Cardiovascular: Palpation: PMI not displaced or enlarged, no thrills or heaves. Auscultation: RRR; no murmur, rubs or gallops. Extremities: no edema, no active varicosity. GI[de-identified] Normal bowel sounds. No masses; upper RUQ tenderness lateral to xyphoid; no rebound/rigidity; no CVA tenderness. Psychiatric[de-identified] Oriented to time, place and person. Normal mood, no agitation or anxiety. Normal affect. Pleasant and cooperative. Neurological[de-identified] CN I to XII: intact. DTR: symmetrical & normal. 
 
Musculoskeletal[de-identified]  
NC/AT,neck is supple. Rt leg: small, mildly tender ?mass on Rt anterior mid leg lateral to tibia. No overlying induration, heat, redness. Lab Results Component Value Date/Time WBC 6.0 08/30/2018 11:25 AM  
 HGB 14.1 08/30/2018 11:25 AM  
 HCT 40.4 08/30/2018 11:25 AM  
 PLATELET 512 92/48/9280 11:25 AM  
 MCV 78.0 08/30/2018 11:25 AM  
 
Lab Results Component Value Date/Time Sodium 142 12/03/2018 08:26 AM  
 Potassium 4.4 12/03/2018 08:26 AM  
 Chloride 104 12/03/2018 08:26 AM  
 CO2 30 12/03/2018 08:26 AM  
 Anion gap 8 12/03/2018 08:26 AM  
 Glucose 117 (H) 12/03/2018 08:26 AM  
 BUN 14 12/03/2018 08:26 AM  
 Creatinine 0.84 12/03/2018 08:26 AM  
 BUN/Creatinine ratio 17 12/03/2018 08:26 AM  
 GFR est AA >60 12/03/2018 08:26 AM  
 GFR est non-AA >60 12/03/2018 08:26 AM  
 Calcium 9.6 12/03/2018 08:26 AM  
 Bilirubin, total 0.5 05/31/2018 08:36 AM  
 AST (SGOT) 15 12/03/2018 08:26 AM  
 Alk. phosphatase 86 05/31/2018 08:36 AM  
 Protein, total 7.3 05/31/2018 08:36 AM  
 Albumin 3.7 05/31/2018 08:36 AM  
 Globulin 3.6 05/31/2018 08:36 AM  
 A-G Ratio 1.0 05/31/2018 08:36 AM  
 ALT (SGPT) 21 12/03/2018 08:26 AM  
 
Lab Results Component Value Date/Time Cholesterol, total 141 05/31/2018 08:36 AM  
 HDL Cholesterol 77 (H) 05/31/2018 08:36 AM  
 LDL, calculated 53 05/31/2018 08:36 AM  
 VLDL, calculated 11 05/31/2018 08:36 AM  
 Triglyceride 55 05/31/2018 08:36 AM  
 CHOL/HDL Ratio 1.8 05/31/2018 08:36 AM  
 
Lab Results Component Value Date/Time Hemoglobin A1c 6.3 (H) 08/30/2018 11:25 AM  
 Hemoglobin A1c (POC) 6.9 02/21/2018 12:10 PM  
  
Lab Results Component Value Date/Time TSH 0.84 05/31/2018 08:36 AM  
 
 
ASSESSMENT 1. Type 2 diabetes mellitus without complication, without long-term current use of insulin ([address] 2. Essential hypertension 3. Mass of right lower leg 4. Hyperlipidemia, unspecified hyperlipidemia type 5. Vertigo, aural, left 6. Chronic allergic rhinitis 7. Thyroid nodule PLAN Pharmacologic Management: Medications reviewed with the patient. No change for now. Orders Placed This Encounter  HEMOGLOBIN A1C WITH EAG  
 REFERRAL TO SURGERY  
 amLODIPine (NORVASC) 2.5 mg tablet FU with surgeon for leg mass. Discussed DDx, follow-up & work-up. Discussed risk/benefit & side effect of treatment. Low Salt ADA diet & graduated excecise. Follow up as planned, prn sooner. Regular BS check at home and notify MD of results prn. Health risk from non adherence discussed. Patent voiced understanding. Follow-up Disposition: 
Return in about 3 months (around 6/4/2019) for fasting.  
 
Donaldo Rodriguez MD

## 2019-03-26 RX ORDER — HYDROCHLOROTHIAZIDE 12.5 MG/1
12.5 TABLET ORAL EVERY OTHER DAY
Qty: 45 TAB | Refills: 0 | Status: SHIPPED | OUTPATIENT
Start: 2019-03-26 | End: 2019-07-11 | Stop reason: SDUPTHER

## 2019-03-26 NOTE — TELEPHONE ENCOUNTER
Pt calling to request med refill of:  Requested Prescriptions     Pending Prescriptions Disp Refills    glucose blood VI test strips (FREESTYLE LITE STRIPS) strip 100 Strip 3     Sig: Use as directed to check blood sugar once a day    hydroCHLOROthiazide (HYDRODIURIL) 12.5 mg tablet 45 Tab 0     Sig: Take 1 Tab by mouth every other day for 90 days. Be sent to [facility]    Pt has 5 days tabs remaining     Pts last appt was 3/4/19  & next appt anshul for 6/4/19    Pt advised of 72 hour time frame. Please advise.

## 2019-06-03 ENCOUNTER — OFFICE VISIT (OUTPATIENT)
Dept: FAMILY MEDICINE CLINIC | Age: 84
End: 2019-06-03

## 2019-06-03 VITALS
HEART RATE: 56 BPM | WEIGHT: 186.2 LBS | TEMPERATURE: 96.9 F | HEIGHT: 67 IN | DIASTOLIC BLOOD PRESSURE: 78 MMHG | OXYGEN SATURATION: 97 % | BODY MASS INDEX: 29.22 KG/M2 | SYSTOLIC BLOOD PRESSURE: 152 MMHG | RESPIRATION RATE: 16 BRPM

## 2019-06-03 DIAGNOSIS — E11.9 TYPE 2 DIABETES MELLITUS WITHOUT COMPLICATION, WITHOUT LONG-TERM CURRENT USE OF INSULIN (HCC): Primary | ICD-10-CM

## 2019-06-03 DIAGNOSIS — E78.5 HYPERLIPIDEMIA, UNSPECIFIED HYPERLIPIDEMIA TYPE: ICD-10-CM

## 2019-06-03 DIAGNOSIS — E04.1 THYROID NODULE: ICD-10-CM

## 2019-06-03 DIAGNOSIS — I10 ESSENTIAL HYPERTENSION: ICD-10-CM

## 2019-06-03 PROBLEM — R10.11 RIGHT UPPER QUADRANT ABDOMINAL PAIN: Status: ACTIVE | Noted: 2019-06-03

## 2019-06-03 NOTE — PROGRESS NOTES
SUBJECTIVE     Chief Complaint   Patient presents with    Diabetes     f/u    Hypertension     f/u    Cholesterol Problem     f/u       HPI:     Her BP is being treated with amlodipine, losartan and hydrolosatchlorothiazide. She says that her BP is high today because she has been taking losartan every other day for the last several weeks. Her glucose is running good with life style and Glucophage. No hypoglycemia   symptoms. She has stopped Lipitor because it is causing muscle ache and stiffness. She feels better now. She is following diet for lipids and DM. She has history of right thyroid nodule which has been biopsied and was found to be benign. She already has seen surgeon and biopsy was normal and is following. She has recurrent dysbalance / dizziness. She has seen ENT. Her problem with balance or vertigo is now rare. She uses as needed Antivert with good results. Her rhinitis is controlled. The tender small lump on her Rt shin area has \"disappeared\". She is on CPAP for BRIAN. No other Systemic, Cardiovascular or Respiratory symptoms. PMH: Hypertension 2006, DM II 2005, Hyperlipidemia, BRIAN, Chronic Allergic Rhinitis, Iron and Folate Deficiency Anemia, Glaucoma, Thyroid Nodules (neg FNA), Seborrheic Karatosis, Cataract Surgery 2012, Hysterectomy 1993 (Fibroids), Bunionectomy 1995, Herniorrhapy 2002, Colonic Polyps (due 2017). Current Outpatient Medications   Medication Sig    glucose blood VI test strips (FREESTYLE LITE STRIPS) strip Use as directed to check blood sugar once a day    hydroCHLOROthiazide (HYDRODIURIL) 12.5 mg tablet Take 1 Tab by mouth every other day for 90 days.  amLODIPine (NORVASC) 2.5 mg tablet Take 1 Tab by mouth daily.  metFORMIN (GLUCOPHAGE) 500 mg tablet Take 1 Tab by mouth two (2) times daily (with meals).  losartan (COZAAR) 100 mg tablet Take 1 Tab by mouth daily.     meclizine (ANTIVERT) 25 mg tablet Take 1 Tab by mouth three (3) times daily as needed.  Blood-Glucose Meter (FREESTYLE LITE METER) monitoring kit Use as directed to check blood sugar once a day    Lancets (FREESTYLE LANCETS) misc Use as directed to check blood sugar once a day.  latanoprost (XALATAN) 0.005 % ophthalmic solution Administer 1 Drop to both eyes nightly. No current facility-administered medications for this visit. The patient is taking losartan every other day. Allergies   Allergen Reactions    Latex, Natural Rubber Other (comments)    Adhesive Contact Dermatitis     LEAVES DA ON SKIN    USE PAPER TAPE       ROS:   Constitutional: No fever, chills, night sweats, malaise. Ear/Nose/Throat: No ear/ throat pain, lesions, unusual discharge, new speaking or hearing problems, nose bleed. Cardiovascular: No angina, palpitations, PND, orthopnea, lightheadedness, edema, claudication. Respiratory: No dyspnea, wheeze, pleurisy, hemoptysis, unusual cough or sputum. Gastrointestinal: No nausea/ vomiting, bowel habit change, pain, ERA symptoms, melena, hematochezia, anorexia. Psychiatric: No agitation, confusion/disorientation, suicidal or homicidal ideation. Musculoskeletal: No focal weakness. No other complaints. OBJECTIVE   Constitutional: General Appearance: well developed, overweight, nontoxic, in no acute distress. Visit Vitals  /78 (BP 1 Location: Right arm, BP Patient Position: Sitting)   Pulse (!) 56   Temp 96.9 °F (36.1 °C) (Oral)   Resp 16   Ht 5' 7\" (1.702 m)   Wt 186 lb 3.2 oz (84.5 kg)   SpO2 97%   BMI 29.16 kg/m²     Pulmonary: Respiratory effort: normal; no dyspnea, no retractions, no accessory muscle use. Auscultation: normal & symmetrical air exchange; no rales, no rhonchi, no wheeze; no rubs     Cardiovascular: Palpation: PMI not displaced or enlarged, no thrills or heaves. Auscultation: RRR; no murmur, rubs or gallops. Extremities: no edema, no active varicosity. GI[de-identified] Normal bowel sounds.   No masses; upper RUQ tenderness lateral to xyphoid; no rebound/rigidity; no CVA tenderness. Psychiatric[de-identified] Oriented to time, place and person. Normal mood, no agitation or anxiety. Normal affect. Pleasant and cooperative. Neurological[de-identified] CN I to XII: intact. DTR: symmetrical & normal.    Musculoskeletal[de-identified]   NC/AT,neck is supple. Lab Results   Component Value Date/Time    WBC 6.0 08/30/2018 11:25 AM    HGB 14.1 08/30/2018 11:25 AM    HCT 40.4 08/30/2018 11:25 AM    PLATELET 087 72/45/2118 11:25 AM    MCV 78.0 08/30/2018 11:25 AM     Lab Results   Component Value Date/Time    Sodium 142 12/03/2018 08:26 AM    Potassium 4.4 12/03/2018 08:26 AM    Chloride 104 12/03/2018 08:26 AM    CO2 30 12/03/2018 08:26 AM    Anion gap 8 12/03/2018 08:26 AM    Glucose 117 (H) 12/03/2018 08:26 AM    BUN 14 12/03/2018 08:26 AM    Creatinine 0.84 12/03/2018 08:26 AM    BUN/Creatinine ratio 17 12/03/2018 08:26 AM    GFR est AA >60 12/03/2018 08:26 AM    GFR est non-AA >60 12/03/2018 08:26 AM    Calcium 9.6 12/03/2018 08:26 AM    Bilirubin, total 0.5 05/31/2018 08:36 AM    AST (SGOT) 15 12/03/2018 08:26 AM    Alk. phosphatase 86 05/31/2018 08:36 AM    Protein, total 7.3 05/31/2018 08:36 AM    Albumin 3.7 05/31/2018 08:36 AM    Globulin 3.6 05/31/2018 08:36 AM    A-G Ratio 1.0 05/31/2018 08:36 AM    ALT (SGPT) 21 12/03/2018 08:26 AM     Lab Results   Component Value Date/Time    Cholesterol, total 141 05/31/2018 08:36 AM    HDL Cholesterol 77 (H) 05/31/2018 08:36 AM    LDL, calculated 53 05/31/2018 08:36 AM    VLDL, calculated 11 05/31/2018 08:36 AM    Triglyceride 55 05/31/2018 08:36 AM    CHOL/HDL Ratio 1.8 05/31/2018 08:36 AM     Lab Results   Component Value Date/Time    Hemoglobin A1c 6.5 (H) 03/04/2019 08:54 AM    Hemoglobin A1c (POC) 6.9 02/21/2018 12:10 PM      Lab Results   Component Value Date/Time    TSH 0.84 05/31/2018 08:36 AM       ASSESSMENT     1.  Type 2 diabetes mellitus without complication, without long-term current use of insulin ([address]    2. Essential hypertension    3. Hyperlipidemia, unspecified hyperlipidemia type    4. Thyroid nodule        PLAN     Discussed the patient's BMI with her. The BMI follow up plan is as follows:   -dietary management education, guidance, and counseling  -encourage exercise  -monitor weight prescribed dietary intake  -printed instructions in AVS.        Pharmacologic Management: Medications reviewed with the patient. Go back to taking losartan daily regularly. Orders Placed This Encounter    CBC WITH AUTOMATED DIFF    METABOLIC PANEL, COMPREHENSIVE    LIPID PANEL    TSH 3RD GENERATION    T4, FREE    URINALYSIS W/ RFLX MICROSCOPIC    HEMOGLOBIN A1C WITH EAG    URIC ACID   Coming back in 1 week for blood pressure check and fasting blood work. Discussed DDx, follow-up & work-up. Discussed risk/benefit & side effect of treatment. Low Salt ADA diet & graduated excecise. Follow up as planned, prn sooner. Regular BS check at home and notify MD of results prn. Health risk from non adherence discussed. Patent voiced understanding. Follow-up and Dispositions    · Return in about 3 months (around 9/3/2019).        Radha Tate MD

## 2019-06-03 NOTE — PATIENT INSTRUCTIONS
Body Mass Index: Care Instructions  Your Care Instructions    Body mass index (BMI) can help you see if your weight is raising your risk for health problems. It uses a formula to compare how much you weigh with how tall you are. · A BMI lower than 18.5 is considered underweight. · A BMI between 18.5 and 24.9 is considered healthy. · A BMI between 25 and 29.9 is considered overweight. A BMI of 30 or higher is considered obese. If your BMI is in the normal range, it means that you have a lower risk for weight-related health problems. If your BMI is in the overweight or obese range, you may be at increased risk for weight-related health problems, such as high blood pressure, heart disease, stroke, arthritis or joint pain, and diabetes. If your BMI is in the underweight range, you may be at increased risk for health problems such as fatigue, lower protection (immunity) against illness, muscle loss, bone loss, hair loss, and hormone problems. BMI is just one measure of your risk for weight-related health problems. You may be at higher risk for health problems if you are not active, you eat an unhealthy diet, or you drink too much alcohol or use tobacco products. Follow-up care is a key part of your treatment and safety. Be sure to make and go to all appointments, and call your doctor if you are having problems. It's also a good idea to know your test results and keep a list of the medicines you take. How can you care for yourself at home? · Practice healthy eating habits. This includes eating plenty of fruits, vegetables, whole grains, lean protein, and low-fat dairy. · If your doctor recommends it, get more exercise. Walking is a good choice. Bit by bit, increase the amount you walk every day. Try for at least 30 minutes on most days of the week. · Do not smoke. Smoking can increase your risk for health problems. If you need help quitting, talk to your doctor about stop-smoking programs and medicines. These can increase your chances of quitting for good. · Limit alcohol to 2 drinks a day for men and 1 drink a day for women. Too much alcohol can cause health problems. If you have a BMI higher than 25  · Your doctor may do other tests to check your risk for weight-related health problems. This may include measuring the distance around your waist. A waist measurement of more than 40 inches in men or 35 inches in women can increase the risk of weight-related health problems. · Talk with your doctor about steps you can take to stay healthy or improve your health. You may need to make lifestyle changes to lose weight and stay healthy, such as changing your diet and getting regular exercise. If you have a BMI lower than 18.5  · Your doctor may do other tests to check your risk for health problems. · Talk with your doctor about steps you can take to stay healthy or improve your health. You may need to make lifestyle changes to gain or maintain weight and stay healthy, such as getting more healthy foods in your diet and doing exercises to build muscle. Where can you learn more? Go to http://yusra-maria l.info/. Enter S176 in the search box to learn more about \"Body Mass Index: Care Instructions. \"  Current as of: October 13, 2016  Content Version: 11.4  © 9622-0517 [employer]. Care instructions adapted under license by [employer] (which disclaims liability or warranty for this information). If you have questions about a medical condition or this instruction, always ask your healthcare professional. [place] [address] any warranty or liability for your use of this information. Starting a Weight Loss Plan: Care Instructions  Your Care Instructions    If you are thinking about losing weight, it can be hard to know where to start. Your doctor can help you set up a weight loss plan that best meets your needs.  You may want to take a class on nutrition or exercise, or join a weight loss support group. If you have questions about how to make changes to your eating or exercise habits, ask your doctor about seeing a registered dietitian or an exercise specialist.  It can be a big challenge to lose weight. But you do not have to make huge changes at once. Make small changes, and stick with them. When those changes become habit, add a few more changes. If you do not think you are ready to make changes right now, try to pick a date in the future. Make an appointment to see your doctor to discuss whether the time is right for you to start a plan. Follow-up care is a key part of your treatment and safety. Be sure to make and go to all appointments, and call your doctor if you are having problems. It's also a good idea to know your test results and keep a list of the medicines you take. How can you care for yourself at home? · Set realistic goals. Many people expect to lose much more weight than is likely. A weight loss of 5% to 10% of your body weight may be enough to improve your health. · Get family and friends involved to provide support. Talk to them about why you are trying to lose weight, and ask them to help. They can help by participating in exercise and having meals with you, even if they may be eating something different. · Find what works best for you. If you do not have time or do not like to cook, a program that offers meal replacement bars or shakes may be better for you. Or if you like to prepare meals, finding a plan that includes daily menus and recipes may be best.  · Ask your doctor about other health professionals who can help you achieve your weight loss goals. ¨ A dietitian can help you make healthy changes in your diet.   ¨ An exercise specialist or  can help you develop a safe and effective exercise program.  ¨ A counselor or psychiatrist can help you cope with issues such as depression, anxiety, or family problems that can make it hard to focus on weight loss. · Consider joining a support group for people who are trying to lose weight. Your doctor can suggest groups in your area. Where can you learn more? Go to http://yusra-maria l.info/. Enter Y534 in the search box to learn more about \"Starting a Weight Loss Plan: Care Instructions. \"  Current as of: October 13, 2016  Content Version: 11.4  © 5966-6488 [employer]. Care instructions adapted under license by [employer] (which disclaims liability or warranty for this information). If you have questions about a medical condition or this instruction, always ask your healthcare professional. [address] any warranty or liability for your use of this information.

## 2019-06-03 NOTE — PROGRESS NOTES
Lindsey Graham is a 81 y.o. female presents in office for f/u. Health Maintenance Due   Topic Date Due    Shingrix Vaccine Age 49> (1 of 2) 10/10/1986    LIPID PANEL Q1  05/31/2019       1. Have you been to the ER, urgent care clinic since your last visit? Hospitalized since your last visit? No    2. Have you seen or consulted any other health care providers outside of the [address] [place] [address] since your last visit? Include any pap smears or colon screening.  No

## 2019-06-10 ENCOUNTER — HOSPITAL ENCOUNTER (OUTPATIENT)
Dept: LAB | Age: 84
Discharge: HOME | End: 2019-06-10
Payer: MEDICARE

## 2019-06-10 ENCOUNTER — CLINICAL SUPPORT (OUTPATIENT)
Dept: FAMILY MEDICINE CLINIC | Age: 84
End: 2019-06-10

## 2019-06-10 VITALS
RESPIRATION RATE: 15 BRPM | TEMPERATURE: 97.4 F | BODY MASS INDEX: 29.19 KG/M2 | SYSTOLIC BLOOD PRESSURE: 112 MMHG | WEIGHT: 186 LBS | DIASTOLIC BLOOD PRESSURE: 72 MMHG | HEART RATE: 93 BPM | OXYGEN SATURATION: 99 % | HEIGHT: 67 IN

## 2019-06-10 DIAGNOSIS — I10 ESSENTIAL HYPERTENSION: ICD-10-CM

## 2019-06-10 DIAGNOSIS — E11.9 TYPE 2 DIABETES MELLITUS WITHOUT COMPLICATION, WITHOUT LONG-TERM CURRENT USE OF INSULIN (HCC): ICD-10-CM

## 2019-06-10 DIAGNOSIS — Z01.30 BP CHECK: Primary | ICD-10-CM

## 2019-06-10 DIAGNOSIS — E78.5 HYPERLIPIDEMIA, UNSPECIFIED HYPERLIPIDEMIA TYPE: ICD-10-CM

## 2019-06-10 DIAGNOSIS — E04.1 THYROID NODULE: ICD-10-CM

## 2019-06-10 LAB
ALBUMIN SERPL-MCNC: 3.6 G/DL (ref 3.4–5)
ALBUMIN/GLOB SERPL: 1 {RATIO} (ref 0.8–1.7)
ALP SERPL-CCNC: 82 U/L (ref 45–117)
ALT SERPL-CCNC: 24 U/L (ref 13–56)
ANION GAP SERPL CALC-SCNC: 6 MMOL/L (ref 3–18)
APPEARANCE UR: CLEAR
AST SERPL-CCNC: 16 U/L (ref 15–37)
BACTERIA URNS QL MICRO: NEGATIVE /HPF
BASOPHILS # BLD: 0 K/UL (ref 0–0.1)
BASOPHILS NFR BLD: 1 % (ref 0–2)
BILIRUB SERPL-MCNC: 0.4 MG/DL (ref 0.2–1)
BILIRUB UR QL: NEGATIVE
BUN SERPL-MCNC: 18 MG/DL (ref 7–18)
BUN/CREAT SERPL: 20 (ref 12–20)
CALCIUM SERPL-MCNC: 9.2 MG/DL (ref 8.5–10.1)
CHLORIDE SERPL-SCNC: 105 MMOL/L (ref 100–108)
CHOLEST SERPL-MCNC: 169 MG/DL
CO2 SERPL-SCNC: 30 MMOL/L (ref 21–32)
COLOR UR: YELLOW
CREAT SERPL-MCNC: 0.9 MG/DL (ref 0.6–1.3)
DIFFERENTIAL METHOD BLD: ABNORMAL
EOSINOPHIL # BLD: 0.1 K/UL (ref 0–0.4)
EOSINOPHIL NFR BLD: 3 % (ref 0–5)
EPITH CASTS URNS QL MICRO: NORMAL /LPF (ref 0–5)
ERYTHROCYTE [DISTWIDTH] IN BLOOD BY AUTOMATED COUNT: 14.2 % (ref 11.6–14.5)
EST. AVERAGE GLUCOSE BLD GHB EST-MCNC: 148 MG/DL
GLOBULIN SER CALC-MCNC: 3.6 G/DL (ref 2–4)
GLUCOSE SERPL-MCNC: 129 MG/DL (ref 74–99)
GLUCOSE UR STRIP.AUTO-MCNC: NEGATIVE MG/DL
HBA1C MFR BLD: 6.8 % (ref 4.2–5.6)
HCT VFR BLD AUTO: 38.5 % (ref 35–45)
HDLC SERPL-MCNC: 87 MG/DL (ref 40–60)
HDLC SERPL: 1.9 {RATIO} (ref 0–5)
HGB BLD-MCNC: 13.3 G/DL (ref 12–16)
HGB UR QL STRIP: NEGATIVE
KETONES UR QL STRIP.AUTO: NEGATIVE MG/DL
LDLC SERPL CALC-MCNC: 69.6 MG/DL (ref 0–100)
LEUKOCYTE ESTERASE UR QL STRIP.AUTO: ABNORMAL
LIPID PROFILE,FLP: ABNORMAL
LYMPHOCYTES # BLD: 1.9 K/UL (ref 0.9–3.6)
LYMPHOCYTES NFR BLD: 44 % (ref 21–52)
MCH RBC QN AUTO: 27.2 PG (ref 24–34)
MCHC RBC AUTO-ENTMCNC: 34.5 G/DL (ref 31–37)
MCV RBC AUTO: 78.7 FL (ref 74–97)
MONOCYTES # BLD: 0.5 K/UL (ref 0.05–1.2)
MONOCYTES NFR BLD: 12 % (ref 3–10)
NEUTS SEG # BLD: 1.7 K/UL (ref 1.8–8)
NEUTS SEG NFR BLD: 40 % (ref 40–73)
NITRITE UR QL STRIP.AUTO: NEGATIVE
PH UR STRIP: 5 [PH] (ref 5–8)
PLATELET # BLD AUTO: 253 K/UL (ref 135–420)
PMV BLD AUTO: 10.5 FL (ref 9.2–11.8)
POTASSIUM SERPL-SCNC: 4.6 MMOL/L (ref 3.5–5.5)
PROT SERPL-MCNC: 7.2 G/DL (ref 6.4–8.2)
PROT UR STRIP-MCNC: NEGATIVE MG/DL
RBC # BLD AUTO: 4.89 M/UL (ref 4.2–5.3)
RBC #/AREA URNS HPF: 0 /HPF (ref 0–5)
SODIUM SERPL-SCNC: 141 MMOL/L (ref 136–145)
SP GR UR REFRACTOMETRY: 1.02 (ref 1–1.03)
T4 FREE SERPL-MCNC: 0.9 NG/DL (ref 0.7–1.5)
TRIGL SERPL-MCNC: 62 MG/DL (ref ?–150)
TSH SERPL DL<=0.05 MIU/L-ACNC: 1.28 UIU/ML (ref 0.36–3.74)
URATE SERPL-MCNC: 3.8 MG/DL (ref 2.6–7.2)
UROBILINOGEN UR QL STRIP.AUTO: 0.2 EU/DL (ref 0.2–1)
VLDLC SERPL CALC-MCNC: 12.4 MG/DL
WBC # BLD AUTO: 4.4 K/UL (ref 4.6–13.2)
WBC URNS QL MICRO: NORMAL /HPF (ref 0–4)

## 2019-06-10 PROCEDURE — 84439 ASSAY OF FREE THYROXINE: CPT

## 2019-06-10 PROCEDURE — 36415 COLL VENOUS BLD VENIPUNCTURE: CPT

## 2019-06-10 PROCEDURE — 80053 COMPREHEN METABOLIC PANEL: CPT

## 2019-06-10 PROCEDURE — 84443 ASSAY THYROID STIM HORMONE: CPT

## 2019-06-10 PROCEDURE — 84550 ASSAY OF BLOOD/URIC ACID: CPT

## 2019-06-10 PROCEDURE — 83036 HEMOGLOBIN GLYCOSYLATED A1C: CPT

## 2019-06-10 PROCEDURE — 80061 LIPID PANEL: CPT

## 2019-06-10 PROCEDURE — 81001 URINALYSIS AUTO W/SCOPE: CPT

## 2019-06-10 PROCEDURE — 85025 COMPLETE CBC W/AUTO DIFF WBC: CPT

## 2019-06-11 NOTE — PROGRESS NOTES
Urine analysis, complete blood count, metabolic panel, lipid panel, thyroid panel, uric acid all came back good and stable. Hemoglobin A1c came back slightly higher, still in acceptable range of below 7. The patient is advised to continue with diet and exercise as tolerated. Will need follow-up.

## 2019-07-08 ENCOUNTER — OFFICE VISIT (OUTPATIENT)
Dept: FAMILY MEDICINE CLINIC | Age: 84
End: 2019-07-08

## 2019-07-08 VITALS
OXYGEN SATURATION: 99 % | HEIGHT: 67 IN | DIASTOLIC BLOOD PRESSURE: 82 MMHG | SYSTOLIC BLOOD PRESSURE: 136 MMHG | BODY MASS INDEX: 28.56 KG/M2 | HEART RATE: 92 BPM | RESPIRATION RATE: 17 BRPM | WEIGHT: 182 LBS | TEMPERATURE: 97.8 F

## 2019-07-08 DIAGNOSIS — R07.9 CHEST PAIN, UNSPECIFIED TYPE: Primary | ICD-10-CM

## 2019-07-08 DIAGNOSIS — I10 ESSENTIAL HYPERTENSION: ICD-10-CM

## 2019-07-08 DIAGNOSIS — E78.5 HYPERLIPIDEMIA, UNSPECIFIED HYPERLIPIDEMIA TYPE: ICD-10-CM

## 2019-07-08 DIAGNOSIS — E11.9 TYPE 2 DIABETES MELLITUS WITHOUT COMPLICATION, WITHOUT LONG-TERM CURRENT USE OF INSULIN (HCC): ICD-10-CM

## 2019-07-08 NOTE — PROGRESS NOTES
SUBJECTIVE     Chief Complaint   Patient presents with   [facility] Follow Up       HPI:     This is an ED follow-up for chest pain on 6/26/2019. She did water aerobics that morning with rotating dumbbells by her hands. Approximately 2 hours after finishing her aerobic she had some soreness of the left side of her chest, off and on all day. No exertional or pleuritic symptoms. It was worse when she pushes on her chest.  There was no radiation. No shortness of breath, nausea, vomiting, diaphoresis, back or abdominal pain. No history of ASHD. She had reproducible tenderness over the chest wall in the ED. The work-up for ACS was noncontributory. She was advised to have a stress test while in ED; but she declined and wanted to have the stress test as outpatient. Her BP is being treated with amlodipine, losartan and hydrolosatchlorothiazide. She says that her BP is high today because she has been taking losartan every other day for the last several weeks. Her glucose is running good with life style and Glucophage. No hypoglycemia  symptoms. She has stopped Lipitor because it is causing muscle ache and stiffness. She feels better now. She is following diet for lipids and DM. She has recurrent dysbalance / dizziness. She has seen ENT. Her problem with balance or vertigo is now rare. She uses as needed Antivert with good results. Her rhinitis is controlled. She is on CPAP for BRIAN. No other Systemic, Cardiovascular or Respiratory symptoms.     Past Medical History:   Diagnosis Date    Anemia     Chronic allergic rhinitis     Diabetes ([address]     Glaucoma     Hypercholesterolemia     Hypertension     Iron deficiency     Multiple thyroid nodules     neg (FNA)    BRIAN (obstructive sleep apnea)     Sleep apnea     Thyroid disease     lumps on thyroid    Trigger finger        Current Outpatient Medications   Medication Sig    glucose blood VI test strips (FREESTYLE LITE STRIPS) strip Use as directed to check blood sugar once a day    amLODIPine (NORVASC) 2.5 mg tablet Take 1 Tab by mouth daily.  metFORMIN (GLUCOPHAGE) 500 mg tablet Take 1 Tab by mouth two (2) times daily (with meals).  losartan (COZAAR) 100 mg tablet Take 1 Tab by mouth daily.  meclizine (ANTIVERT) 25 mg tablet Take 1 Tab by mouth three (3) times daily as needed.  Blood-Glucose Meter (FREESTYLE LITE METER) monitoring kit Use as directed to check blood sugar once a day    Lancets (FREESTYLE LANCETS) misc Use as directed to check blood sugar once a day.  latanoprost (XALATAN) 0.005 % ophthalmic solution Administer 1 Drop to both eyes nightly.  hydroCHLOROthiazide (HYDRODIURIL) 12.5 mg tablet Take 1 Tab by mouth every other day for 90 days. No current facility-administered medications for this visit. Allergies   Allergen Reactions    Latex, Natural Rubber Other (comments)    Adhesive Contact Dermatitis     LEAVES DA ON SKIN    USE PAPER TAPE       ROS:   Constitutional: No fever, chills, night sweats, malaise. Ear/Nose/Throat: No ear/ throat pain, lesions, unusual discharge, new speaking or hearing problems, nose bleed. Cardiovascular: No angina, palpitations, PND, orthopnea, lightheadedness, edema, claudication. Respiratory: No dyspnea, wheeze, pleurisy, hemoptysis, unusual cough or sputum. Gastrointestinal: No nausea/ vomiting, bowel habit change, pain, ERA symptoms, melena, hematochezia, anorexia. Psychiatric: No agitation, confusion/disorientation, suicidal or homicidal ideation. Musculoskeletal: No focal weakness. No other complaints. OBJECTIVE   Constitutional: General Appearance: well developed, overweight, nontoxic, in no acute distress.      Visit Vitals  /82 (BP 1 Location: Left arm, BP Patient Position: Sitting)   Pulse 92   Temp 97.8 °F (36.6 °C) (Oral)   Resp 17   Ht 5' 7\" (1.702 m)   Wt 182 lb (82.6 kg)   SpO2 99%   BMI 28.51 kg/m²     Pulmonary: Respiratory effort: normal; no dyspnea, no retractions, no accessory muscle use. Auscultation: normal & symmetrical air exchange; no rales, no rhonchi, no wheeze; no rubs     Cardiovascular: Palpation: PMI not displaced or enlarged, no thrills or heaves. Auscultation: RRR; no murmur, rubs or gallops. Extremities: no edema, no active varicosity. GI[de-identified] Normal bowel sounds. No masses; upper RUQ tenderness lateral to xyphoid; no rebound/rigidity; no CVA tenderness. Psychiatric[de-identified] Oriented to time, place and person. Normal mood, no agitation or anxiety. Normal affect. Pleasant and cooperative. Neurological[de-identified] CN I to XII: intact. DTR: symmetrical & normal.    Musculoskeletal[de-identified]   NC/AT,neck is supple. Lab Results   Component Value Date/Time    WBC 4.4 (L) 06/10/2019 08:14 AM    HGB 13.3 06/10/2019 08:14 AM    HCT 38.5 06/10/2019 08:14 AM    PLATELET 203 28/21/5260 08:14 AM    MCV 78.7 06/10/2019 08:14 AM     Lab Results   Component Value Date/Time    Sodium 141 06/10/2019 08:14 AM    Potassium 4.6 06/10/2019 08:14 AM    Chloride 105 06/10/2019 08:14 AM    CO2 30 06/10/2019 08:14 AM    Anion gap 6 06/10/2019 08:14 AM    Glucose 129 (H) 06/10/2019 08:14 AM    BUN 18 06/10/2019 08:14 AM    Creatinine 0.90 06/10/2019 08:14 AM    BUN/Creatinine ratio 20 06/10/2019 08:14 AM    GFR est AA >60 06/10/2019 08:14 AM    GFR est non-AA 60 (L) 06/10/2019 08:14 AM    Calcium 9.2 06/10/2019 08:14 AM    Bilirubin, total 0.4 06/10/2019 08:14 AM    AST (SGOT) 16 06/10/2019 08:14 AM    Alk.  phosphatase 82 06/10/2019 08:14 AM    Protein, total 7.2 06/10/2019 08:14 AM    Albumin 3.6 06/10/2019 08:14 AM    Globulin 3.6 06/10/2019 08:14 AM    A-G Ratio 1.0 06/10/2019 08:14 AM    ALT (SGPT) 24 06/10/2019 08:14 AM     Lab Results   Component Value Date/Time    Cholesterol, total 169 06/10/2019 08:14 AM    HDL Cholesterol 87 (H) 06/10/2019 08:14 AM    LDL, calculated 69.6 06/10/2019 08:14 AM    VLDL, calculated 12.4 06/10/2019 08:14 AM    Triglyceride 62 06/10/2019 08:14 AM    CHOL/HDL Ratio 1.9 06/10/2019 08:14 AM     Lab Results   Component Value Date/Time    Hemoglobin A1c 6.8 (H) 06/10/2019 08:14 AM    Hemoglobin A1c (POC) 6.9 02/21/2018 12:10 PM      Lab Results   Component Value Date/Time    TSH 1.28 06/10/2019 08:14 AM    T4, Free 0.9 06/10/2019 08:14 AM       CBC WITH DIFFERENTIAL AUTO (06/26/2019 5:37 PM EDT)  CBC WITH DIFFERENTIAL AUTO (06/26/2019 5:37 PM EDT)   Component Value Ref Range Performed At Pathologist Signature   WBC x 10*3 6.3 4.0 - 11.0 K/uL [facility]     RBC x 10^6 5.09 3.80 - 5.20 M/uL [facility]     HGB 13.8 11.7 - 16.1 g/dL [facility]     HCT 40.0 35.1 - 48.3 % [facility]     MCV 79 (L) 80 - 95 fL [facility]     MCH 27 26 - 34 pg [facility]     MCHC 35 31 - 36 g/dL [facility]     RDW 13.7 10.0 - 15.5 % [facility]     Platelet 648 376 - 678 K/uL [facility]     MPV 10.0 9.0 - 13.0 fL [facility]     Segmented Neutrophils 34 (L) 40 - 75 % [facility]     Lymphocytes 51 (H) 20 - 45 % [facility]     Monocytes 13 (H) 3 - 12 % [facility]     Eosinophil 2 0 - 6 % [facility]     Basophils 1 0 - 2 % [facility]     Absolute Neutrophils 2.1 1.8 - 7.7 K/uL [facility]     Absolute Lymphocytes 3.2 1.0 - 4.8 K/uL [facility]     Absolute Monocyte Count 0.8 0.1 - 1.0 K/uL [facility]     Absolute Eosinophil 0.1 0.0 - 0.5 K/uL [facility]     Absolute Basophil Count 0.0 0.0 - 0.2 K/uL [facility]      BASIC METABOLIC PANEL (78/75/8182 5:37 PM EDT)  BASIC METABOLIC PANEL (06/26/2019 5:37 PM EDT)   Component Value Ref Range Performed At Pathologist Signature   Potassium 4.1 3.5 - 5.5 mmol/L [facility]     Sodium 138 133 - 145 mmol/L [facility]     Chloride 100 98 - 110 mmol/L [facility]     Glucose 89 70 - 99 mg/dL [facility]     Calcium 10.1 8.4 - 10.5 mg/dL [facility]     BUN 22 6 - 22 mg/dL [facility]     Creatinine 0.8 0.8 - 1.4 mg/dL [facility]     CO2 21 20 - 32 mmol/L [facility]     eGFR  >60.0 >60.0 [facility]     eGFR Non African American >60.0 >60.0 [facility]     Anion Gap 17.5 mmol/L [facility]           EKG 6/26/19  Sinus rhythm-normal P axis  Probable left atrial enlargement  Left anterior fascicular block  Probable anteroseptal infarct.  significant change since prior tracing  1/9/18. CXR 6/26/19  No active cardiopulmonary disease.     ASSESSMENT     1. Chest pain, unspecified type    2. Type 2 diabetes mellitus without complication, without long-term current use of insulin ([facility] [address]    3. Essential hypertension    4. Hyperlipidemia, unspecified hyperlipidemia type        PLAN     Pharmacologic Management: Medications reviewed with the patient. Orders:  Nuclear cardiac stress test.    Discussed DDx, follow-up & work-up. Discussed risk/benefit & side effect of treatment. Low Salt ADA diet & graduated excecise. Follow up as planned, prn sooner. Regular BS check at home and notify MD of results prn. Follow-up for regular visit, as needed sooner. PRN to ED. Health risk from non adherence discussed. Patent voiced understanding.       Maude Navarrete MD

## 2019-07-11 RX ORDER — HYDROCHLOROTHIAZIDE 12.5 MG/1
12.5 TABLET ORAL EVERY OTHER DAY
Qty: 45 TAB | Refills: 3 | Status: SHIPPED | OUTPATIENT
Start: 2019-07-11 | End: 2019-09-09 | Stop reason: SDUPTHER

## 2019-07-11 NOTE — TELEPHONE ENCOUNTER
Pt calling to request medication refill of:    Requested Prescriptions     Pending Prescriptions Disp Refills    hydroCHLOROthiazide (HYDRODIURIL) 12.5 mg tablet 45 Tab 0     Sig: Take 1 Tab by mouth every other day for 90 days. be sent to [facility]  Pt has about a week and a half remaining. Pts last appt was 7/8, next appt sched for 9/3. Advised pt of 72 hour time frame for refill requests. Please advise.

## 2019-07-23 ENCOUNTER — TELEPHONE (OUTPATIENT)
Dept: FAMILY MEDICINE CLINIC | Age: 84
End: 2019-07-23

## 2019-07-23 DIAGNOSIS — R07.9 CHEST PAIN, UNSPECIFIED TYPE: Primary | ICD-10-CM

## 2019-07-23 DIAGNOSIS — R94.39 ABNORMAL NUCLEAR STRESS TEST: ICD-10-CM

## 2019-07-23 DIAGNOSIS — I10 ESSENTIAL HYPERTENSION: ICD-10-CM

## 2019-07-23 DIAGNOSIS — E78.5 HYPERLIPIDEMIA, UNSPECIFIED HYPERLIPIDEMIA TYPE: ICD-10-CM

## 2019-07-23 DIAGNOSIS — E11.9 TYPE 2 DIABETES MELLITUS WITHOUT COMPLICATION, WITHOUT LONG-TERM CURRENT USE OF INSULIN (HCC): ICD-10-CM

## 2019-07-23 NOTE — TELEPHONE ENCOUNTER
Stress test was borderline positive. Therefore we will refer to cardiology for further evaluation if any.

## 2019-07-24 ENCOUNTER — TELEPHONE (OUTPATIENT)
Dept: FAMILY MEDICINE CLINIC | Age: 84
End: 2019-07-24

## 2019-07-24 NOTE — TELEPHONE ENCOUNTER
Patient was informed of stress test results and states she sees Dr. Jennifer Haer for Cardiology. No further questions or concern at this time.

## 2019-07-26 NOTE — TELEPHONE ENCOUNTER
Called, verified 2 patient identifiers, and provided Stress test results and recommendations to patient. Patient verbalized understanding and had no further questions or concerns. Patient has an appt with Dr. Fermin Barone on 07/30/2019. Closing encounter.

## 2019-07-30 DIAGNOSIS — E78.5 HYPERLIPIDEMIA, UNSPECIFIED HYPERLIPIDEMIA TYPE: ICD-10-CM

## 2019-07-30 DIAGNOSIS — I10 ESSENTIAL HYPERTENSION: ICD-10-CM

## 2019-07-30 DIAGNOSIS — E11.9 TYPE 2 DIABETES MELLITUS WITHOUT COMPLICATION, WITHOUT LONG-TERM CURRENT USE OF INSULIN (HCC): ICD-10-CM

## 2019-07-30 DIAGNOSIS — R07.9 CHEST PAIN, UNSPECIFIED TYPE: ICD-10-CM

## 2019-08-14 RX ORDER — LOSARTAN POTASSIUM 100 MG/1
100 TABLET ORAL DAILY
Qty: 90 TAB | Refills: 3 | Status: SHIPPED | OUTPATIENT
Start: 2019-08-14 | End: 2020-08-24 | Stop reason: SDUPTHER

## 2019-08-14 NOTE — TELEPHONE ENCOUNTER
Pt calling to request medication refill of:  Requested Prescriptions     Pending Prescriptions Disp Refills    losartan (COZAAR) 100 mg tablet 90 Tab 3     Sig: Take 1 Tab by mouth daily. be sent to NO PHARMACY PREFERENCE   Pt has about 4 tabs remaining. Pts last appt was 7/8, next appt sched for 9/10. Advised pt of 72 hour time frame for refill requests. Pt advised to contact pharmacy in 72 hours. Please advise.

## 2019-09-09 PROBLEM — R07.9 CHEST PAIN: Status: ACTIVE | Noted: 2019-06-26

## 2019-09-09 PROBLEM — N95.9 MENOPAUSAL AND POSTMENOPAUSAL DISORDER: Status: ACTIVE | Noted: 2019-09-09

## 2019-09-09 PROBLEM — K43.2 INCISIONAL HERNIA: Status: ACTIVE | Noted: 2019-09-09

## 2019-09-09 RX ORDER — CLOBETASOL PROPIONATE 0.5 MG/G
1 CREAM TOPICAL 2 TIMES DAILY
COMMUNITY
Start: 2019-08-10

## 2019-09-09 RX ORDER — HYDROCHLOROTHIAZIDE 12.5 MG/1
12.5 CAPSULE ORAL EVERY OTHER DAY
COMMUNITY
End: 2020-08-24 | Stop reason: SDUPTHER

## 2019-09-09 RX ORDER — ATORVASTATIN CALCIUM 10 MG/1
10 TABLET, FILM COATED ORAL DAILY
COMMUNITY
Start: 2017-12-04 | End: 2019-09-10

## 2019-09-09 RX ORDER — PAROXETINE 10 MG/1
10 TABLET, FILM COATED ORAL
COMMUNITY
End: 2019-09-10

## 2019-09-09 RX ORDER — HYDROCHLOROTHIAZIDE 12.5 MG/1
CAPSULE ORAL
COMMUNITY
Start: 2019-07-19 | End: 2019-09-09 | Stop reason: SDUPTHER

## 2019-09-09 RX ORDER — HYDROCHLOROTHIAZIDE 12.5 MG/1
1 TABLET ORAL
COMMUNITY
Start: 2017-12-04 | End: 2019-09-09 | Stop reason: SDUPTHER

## 2019-09-09 RX ORDER — OXYCODONE AND ACETAMINOPHEN 5; 325 MG/1; MG/1
1-2 TABLET ORAL
COMMUNITY
Start: 2018-01-11 | End: 2019-09-10

## 2019-09-09 RX ORDER — AMLODIPINE BESYLATE 2.5 MG/1
1 TABLET ORAL
COMMUNITY
Start: 2017-12-04 | End: 2020-03-05 | Stop reason: SDUPTHER

## 2019-09-09 RX ORDER — DOCUSATE SODIUM 100 MG/1
100 CAPSULE, LIQUID FILLED ORAL
COMMUNITY
Start: 2018-01-11 | End: 2019-09-10

## 2019-09-09 RX ORDER — ACETAMINOPHEN 500 MG
1 TABLET ORAL
COMMUNITY
End: 2019-09-10

## 2019-09-09 RX ORDER — FLUOCINOLONE ACETONIDE 0.11 MG/ML
OIL TOPICAL
COMMUNITY
Start: 2019-07-15 | End: 2019-09-10

## 2019-09-09 RX ORDER — METFORMIN HYDROCHLORIDE 500 MG/1
500 TABLET ORAL
COMMUNITY
Start: 2017-11-24 | End: 2019-09-09 | Stop reason: SDUPTHER

## 2019-09-09 RX ORDER — LOSARTAN POTASSIUM 100 MG/1
100 TABLET ORAL
COMMUNITY
Start: 2017-12-04 | End: 2019-09-09 | Stop reason: SDUPTHER

## 2019-09-09 RX ORDER — LATANOPROST 50 UG/ML
SOLUTION/ DROPS OPHTHALMIC
COMMUNITY

## 2019-09-09 RX ORDER — CETIRIZINE HCL 10 MG
10 TABLET ORAL DAILY
COMMUNITY
End: 2019-09-10

## 2019-09-09 RX ORDER — FOLIC ACID 1 MG/1
1 TABLET ORAL
COMMUNITY
End: 2019-09-10

## 2019-09-09 RX ORDER — POLYETHYLENE GLYCOL 3350 17 G/17G
POWDER, FOR SOLUTION ORAL
COMMUNITY
Start: 2018-01-11 | End: 2019-09-10

## 2019-09-09 RX ORDER — AMLODIPINE BESYLATE 10 MG/1
2.5 TABLET ORAL DAILY
COMMUNITY
End: 2019-09-10

## 2019-09-10 ENCOUNTER — HOSPITAL ENCOUNTER (OUTPATIENT)
Dept: LAB | Age: 84
Discharge: HOME | End: 2019-09-10
Payer: MEDICARE

## 2019-09-10 ENCOUNTER — OFFICE VISIT (OUTPATIENT)
Dept: FAMILY MEDICINE CLINIC | Age: 84
End: 2019-09-10

## 2019-09-10 VITALS
HEART RATE: 84 BPM | TEMPERATURE: 97.7 F | BODY MASS INDEX: 29.38 KG/M2 | DIASTOLIC BLOOD PRESSURE: 84 MMHG | OXYGEN SATURATION: 98 % | RESPIRATION RATE: 16 BRPM | WEIGHT: 187.2 LBS | SYSTOLIC BLOOD PRESSURE: 136 MMHG | HEIGHT: 67 IN

## 2019-09-10 DIAGNOSIS — E11.9 TYPE 2 DIABETES MELLITUS WITHOUT COMPLICATION, WITHOUT LONG-TERM CURRENT USE OF INSULIN (HCC): Primary | ICD-10-CM

## 2019-09-10 DIAGNOSIS — I10 ESSENTIAL HYPERTENSION: ICD-10-CM

## 2019-09-10 DIAGNOSIS — E11.9 TYPE 2 DIABETES MELLITUS WITHOUT COMPLICATION, WITHOUT LONG-TERM CURRENT USE OF INSULIN (HCC): ICD-10-CM

## 2019-09-10 DIAGNOSIS — R07.9 CHEST PAIN, UNSPECIFIED TYPE: ICD-10-CM

## 2019-09-10 DIAGNOSIS — Z23 ENCOUNTER FOR IMMUNIZATION: ICD-10-CM

## 2019-09-10 DIAGNOSIS — E78.5 HYPERLIPIDEMIA, UNSPECIFIED HYPERLIPIDEMIA TYPE: ICD-10-CM

## 2019-09-10 DIAGNOSIS — E04.1 THYROID NODULE: ICD-10-CM

## 2019-09-10 LAB
CHOLEST SERPL-MCNC: 182 MG/DL
EST. AVERAGE GLUCOSE BLD GHB EST-MCNC: 143 MG/DL
HBA1C MFR BLD: 6.6 % (ref 4.2–5.6)
HDLC SERPL-MCNC: 93 MG/DL (ref 40–60)
HDLC SERPL: 2 {RATIO} (ref 0–5)
LDLC SERPL CALC-MCNC: 79.8 MG/DL (ref 0–100)
LIPID PROFILE,FLP: ABNORMAL
TRIGL SERPL-MCNC: 46 MG/DL (ref ?–150)
VLDLC SERPL CALC-MCNC: 9.2 MG/DL

## 2019-09-10 PROCEDURE — 36415 COLL VENOUS BLD VENIPUNCTURE: CPT

## 2019-09-10 PROCEDURE — 83036 HEMOGLOBIN GLYCOSYLATED A1C: CPT

## 2019-09-10 PROCEDURE — 80061 LIPID PANEL: CPT

## 2019-09-10 RX ORDER — PHENOL/SODIUM PHENOLATE
20 AEROSOL, SPRAY (ML) MUCOUS MEMBRANE DAILY
Qty: 90 TAB | Refills: 1 | Status: SHIPPED | OUTPATIENT
Start: 2019-09-10 | End: 2019-12-09

## 2019-09-10 RX ORDER — PHENOL/SODIUM PHENOLATE
20 AEROSOL, SPRAY (ML) MUCOUS MEMBRANE DAILY
COMMUNITY
End: 2019-09-10 | Stop reason: SDUPTHER

## 2019-09-10 NOTE — PROGRESS NOTES
SUBJECTIVE     Chief Complaint   Patient presents with    Diabetes     f/u    Hypertension     f/u    Cholesterol Problem     f/u    Nodule     thyroid nodule f/u       HPI:     This is an ED follow-up for chest pain on 6/26/2019. She did water aerobics that morning with rotating dumbbells by her hands. Approximately 2 hours after finishing her aerobics she had some soreness of the left side of her chest, off and on all day. No exertional or pleuritic symptoms. There was no radiation. No shortness of breath, nausea, vomiting, diaphoresis, back or abdominal pain. No history of ASHD. She had reproducible tenderness over the chest wall in the ED. The work-up for ACS was noncontributory. Her stress test as outpatient on 7/17/19 was low risk. She has seen cardiology for follow up. Her BP is being treated with amlodipine, losartan and hydrolosatchlorothiazide. Her glucose is running good with life style and Glucophage. No hypoglycemia  symptoms. She has stopped Lipitor because it is causing muscle ache and stiffness for about 6 months. She feels better now. She is following diet for lipids and DM. Her last LDL was good. She had fine-needle aspiration of her thyroid gland last year. The results were benign. She is going to follow-up with the surgeon for further care. In the meantime her thyroid function panel was normal.    She is on CPAP for BRIAN. No other Systemic, Cardiovascular or Respiratory symptoms. Past Medical History:   Diagnosis Date    Anemia     Chronic allergic rhinitis     Diabetes ([address]     Glaucoma     Hypercholesterolemia     Hypertension     Iron deficiency     Multiple thyroid nodules     neg (FNA)    BRIAN (obstructive sleep apnea)     Sleep apnea     Thyroid disease     lumps on thyroid    Trigger finger        Current Outpatient Medications   Medication Sig    psyllium husk (METAMUCIL PO) Take 1 Cap by mouth daily.     Omeprazole delayed release (PRILOSEC D/R) 20 mg tablet Take 20 mg by mouth daily.  clobetasol (TEMOVATE) 0.05 % topical cream Apply 1 g to affected area two (2) times a day.  amLODIPine (NORVASC) 2.5 mg tablet Take 1 Tab by mouth.  hydroCHLOROthiazide (MICROZIDE) 12.5 mg capsule Take 12.5 mg by mouth every other day.  latanoprost (XALATAN) 0.005 % ophthalmic solution Xalatan 0.005 % eye drops    1 drop every day by ophthalmic route.  losartan (COZAAR) 100 mg tablet Take 1 Tab by mouth daily.  glucose blood VI test strips (FREESTYLE LITE STRIPS) strip Use as directed to check blood sugar once a day    metFORMIN (GLUCOPHAGE) 500 mg tablet Take 1 Tab by mouth two (2) times daily (with meals).  meclizine (ANTIVERT) 25 mg tablet Take 1 Tab by mouth three (3) times daily as needed.  Blood-Glucose Meter (FREESTYLE LITE METER) monitoring kit Use as directed to check blood sugar once a day    Lancets (FREESTYLE LANCETS) misc Use as directed to check blood sugar once a day. No current facility-administered medications for this visit. Allergies   Allergen Reactions    Latex, Natural Rubber Other (comments)    Adhesive Contact Dermatitis     LEAVES DA ON SKIN    USE PAPER TAPE       ROS:   Constitutional: No fever, chills, night sweats, malaise. Ear/Nose/Throat: No ear/ throat pain, lesions, unusual discharge, new speaking or hearing problems, nose bleed. Cardiovascular: No angina, palpitations, PND, orthopnea, lightheadedness, edema, claudication. Respiratory: No dyspnea, wheeze, pleurisy, hemoptysis, unusual cough or sputum. Gastrointestinal: No nausea/ vomiting, bowel habit change, pain, ERA symptoms, melena, hematochezia, anorexia. Psychiatric: No agitation, confusion/disorientation, suicidal or homicidal ideation. Musculoskeletal: No focal weakness. No other complaints. OBJECTIVE   Constitutional: General Appearance: well developed, overweight, nontoxic, in no acute distress.      Visit Vitals  BP 136/84 (BP 1 Location: Left arm, BP Patient Position: Sitting)   Pulse 84   Temp 97.7 °F (36.5 °C) (Oral)   Resp 16   Ht 5' 7\" (1.702 m)   Wt 187 lb 3.2 oz (84.9 kg)   SpO2 98%   BMI 29.32 kg/m²     Pulmonary: Respiratory effort: normal; no dyspnea, no retractions, no accessory muscle use. Auscultation: normal & symmetrical air exchange; no rales, no rhonchi, no wheeze; no rubs     Cardiovascular: Palpation: PMI not displaced or enlarged, no thrills or heaves. Auscultation: RRR; no murmur, rubs or gallops. Extremities: no edema, no active varicosity. GI[de-identified] Normal bowel sounds. No masses; upper RUQ tenderness lateral to xyphoid; no rebound/rigidity; no CVA tenderness. Psychiatric[de-identified] Oriented to time, place and person. Normal mood, no agitation or anxiety. Normal affect. Pleasant and cooperative. Neurological[de-identified] CN I to XII: intact. DTR: symmetrical & normal.    Musculoskeletal[de-identified]   NC/AT,neck is supple. Lab Results   Component Value Date/Time    WBC 4.4 (L) 06/10/2019 08:14 AM    HGB 13.3 06/10/2019 08:14 AM    HCT 38.5 06/10/2019 08:14 AM    PLATELET 290 97/73/6781 08:14 AM    MCV 78.7 06/10/2019 08:14 AM     Lab Results   Component Value Date/Time    Sodium 141 06/10/2019 08:14 AM    Potassium 4.6 06/10/2019 08:14 AM    Chloride 105 06/10/2019 08:14 AM    CO2 30 06/10/2019 08:14 AM    Anion gap 6 06/10/2019 08:14 AM    Glucose 129 (H) 06/10/2019 08:14 AM    BUN 18 06/10/2019 08:14 AM    Creatinine 0.90 06/10/2019 08:14 AM    BUN/Creatinine ratio 20 06/10/2019 08:14 AM    GFR est AA >60 06/10/2019 08:14 AM    GFR est non-AA 60 (L) 06/10/2019 08:14 AM    Calcium 9.2 06/10/2019 08:14 AM    Bilirubin, total 0.4 06/10/2019 08:14 AM    AST (SGOT) 16 06/10/2019 08:14 AM    Alk.  phosphatase 82 06/10/2019 08:14 AM    Protein, total 7.2 06/10/2019 08:14 AM    Albumin 3.6 06/10/2019 08:14 AM    Globulin 3.6 06/10/2019 08:14 AM    A-G Ratio 1.0 06/10/2019 08:14 AM    ALT (SGPT) 24 06/10/2019 08:14 AM Lab Results   Component Value Date/Time    Cholesterol, total 169 06/10/2019 08:14 AM    HDL Cholesterol 87 (H) 06/10/2019 08:14 AM    LDL, calculated 69.6 06/10/2019 08:14 AM    VLDL, calculated 12.4 06/10/2019 08:14 AM    Triglyceride 62 06/10/2019 08:14 AM    CHOL/HDL Ratio 1.9 06/10/2019 08:14 AM     Lab Results   Component Value Date/Time    Hemoglobin A1c 6.8 (H) 06/10/2019 08:14 AM    Hemoglobin A1c (POC) 6.9 02/21/2018 12:10 PM      Lab Results   Component Value Date/Time    TSH 1.28 06/10/2019 08:14 AM    T4, Free 0.9 06/10/2019 08:14 AM       CBC WITH DIFFERENTIAL AUTO (06/26/2019 5:37 PM EDT)  CBC WITH DIFFERENTIAL AUTO (06/26/2019 5:37 PM EDT)   Component Value Ref Range Performed At Pathologist Signature   WBC x 10*3 6.3 4.0 - 11.0 K/uL [facility]     RBC x 10^6 5.09 3.80 - 5.20 M/uL [facility]     HGB 13.8 11.7 - 16.1 g/dL [facility]     HCT 40.0 35.1 - 48.3 % [facility]     MCV 79 (L) 80 - 95 fL [facility]     MCH 27 26 - 34 pg [facility]     MCHC 35 31 - 36 g/dL [facility]     RDW 13.7 10.0 - 15.5 % [facility]     Platelet 066 973 - 382 K/uL [facility]     MPV 10.0 9.0 - 13.0 fL [facility]     Segmented Neutrophils 34 (L) 40 - 75 % [facility]     Lymphocytes 51 (H) 20 - 45 % [facility]     Monocytes 13 (H) 3 - 12 % [facility]     Eosinophil 2 0 - 6 % [facility]     Basophils 1 0 - 2 % [facility]     Absolute Neutrophils 2.1 1.8 - 7.7 K/uL [facility]     Absolute Lymphocytes 3.2 1.0 - 4.8 K/uL [facility]     Absolute Monocyte Count 0.8 0.1 - 1.0 K/uL [facility]     Absolute Eosinophil 0.1 0.0 - 0.5 K/uL [facility]     Absolute Basophil Count 0.0 0.0 - 0.2 K/uL [facility]      BASIC METABOLIC PANEL (76/53/4165 5:37 PM EDT)  BASIC METABOLIC PANEL (40/81/8070 5:37 PM EDT)   Component Value Ref Range Performed At Pathologist Signature   Potassium 4.1 3.5 - 5.5 mmol/L [facility]     Sodium 138 133 - 145 mmol/L [facility]     Chloride 100 98 - 110 mmol/L [facility]     Glucose 89 70 - 99 mg/dL [facility]     Calcium 10.1 8.4 - 10.5 mg/dL [facility]     BUN 22 6 - 22 mg/dL [facility]     Creatinine 0.8 0.8 - 1.4 mg/dL [facility]     CO2 21 20 - 32 mmol/L [facility]     eGFR  >60.0 >60.0 [facility]     eGFR Non African American >60.0 >60.0 [facility]     Anion Gap 17.5 mmol/L [facility]           EKG 6/26/19  Sinus rhythm-normal P axis  Probable left atrial enlargement  Left anterior fascicular block  Probable anteroseptal infarct.  significant change since prior tracing  1/9/18. CXR 6/26/19  No active cardiopulmonary disease.     Nuc Stress Test 7/17/19:  THIS MYOCARDIAL PERFUSION STUDY IS ABNORMAL   THIS IS A LOW RISK STUDY   Mildly Abnormal Myocardial Perfusion Study   There is a small size, mild severity, reversible apical defect , possibly ischemic   LVEF 69% with normal wall motion    ASSESSMENT     1. Type 2 diabetes mellitus without complication, without long-term current use of insulin ([address]    2. Essential hypertension    3. Hyperlipidemia, unspecified hyperlipidemia type    4. Chest pain, unspecified type    5. Thyroid nodule    6.  Encounter for immunization        PLAN     Pharmacologic Management: Medications reviewed with the patient. No change. Orders Placed This Encounter    clobetasol (TEMOVATE) 0.05 % topical cream    amLODIPine (NORVASC) 2.5 mg tablet    hydroCHLOROthiazide (MICROZIDE) 12.5 mg capsule    latanoprost (XALATAN) 0.005 % ophthalmic solution    psyllium husk (METAMUCIL PO)    Omeprazole delayed release (PRILOSEC D/R) 20 mg tablet   Hemoglobin A1c, lipid panel and flu Shot also. Discussed DDx, follow-up & work-up. Discussed risk/benefit & side effect of treatment. Low Salt ADA diet & graduated excecise. Follow up as planned, prn sooner. Regular BS check at home and notify MD of results prn. Follow-up for regular visit, as needed sooner. PRN to ED. Health risk from non adherence discussed. Patent voiced understanding. Follow-up and Dispositions    · Return in about 3 months (around 12/10/2019).            Kirstin Campuzano MD

## 2019-09-10 NOTE — PROGRESS NOTES
Teja Curtis is a 81 y.o. female presents in office for   Chief Complaint   Patient presents with    Diabetes     f/u    Hypertension     f/u    Cholesterol Problem     f/u    Nodule     thyroid nodule f/u         Health Maintenance Due   Topic Date Due    Influenza Age 5 to Adult  08/01/2019       1. Have you been to the ER, urgent care clinic since your last visit? Hospitalized since your last visit? No    2. Have you seen or consulted any other health care providers outside of the [address] [place] [address] since your last visit? Include any pap smears or colon screening. No      Patient is interested in receiving the flu vaccine.

## 2019-12-09 ENCOUNTER — HOSPITAL ENCOUNTER (OUTPATIENT)
Dept: LAB | Age: 84
Discharge: HOME | End: 2019-12-09
Payer: MEDICARE

## 2019-12-09 ENCOUNTER — OFFICE VISIT (OUTPATIENT)
Dept: FAMILY MEDICINE CLINIC | Age: 84
End: 2019-12-09

## 2019-12-09 VITALS
HEIGHT: 67 IN | SYSTOLIC BLOOD PRESSURE: 138 MMHG | DIASTOLIC BLOOD PRESSURE: 80 MMHG | WEIGHT: 192 LBS | BODY MASS INDEX: 30.13 KG/M2 | TEMPERATURE: 97.9 F | HEART RATE: 84 BPM | RESPIRATION RATE: 18 BRPM | OXYGEN SATURATION: 98 %

## 2019-12-09 DIAGNOSIS — T46.6X5A MYALGIA DUE TO STATIN: ICD-10-CM

## 2019-12-09 DIAGNOSIS — E78.5 HYPERLIPIDEMIA, UNSPECIFIED HYPERLIPIDEMIA TYPE: ICD-10-CM

## 2019-12-09 DIAGNOSIS — E11.9 TYPE 2 DIABETES MELLITUS WITHOUT COMPLICATION, WITHOUT LONG-TERM CURRENT USE OF INSULIN (HCC): ICD-10-CM

## 2019-12-09 DIAGNOSIS — Z00.00 ENCOUNTER FOR MEDICARE ANNUAL WELLNESS EXAM: Primary | ICD-10-CM

## 2019-12-09 DIAGNOSIS — M79.10 MYALGIA DUE TO STATIN: ICD-10-CM

## 2019-12-09 DIAGNOSIS — K21.9 GASTROESOPHAGEAL REFLUX DISEASE, ESOPHAGITIS PRESENCE NOT SPECIFIED: ICD-10-CM

## 2019-12-09 DIAGNOSIS — I10 ESSENTIAL HYPERTENSION: ICD-10-CM

## 2019-12-09 DIAGNOSIS — Z71.89 ACP (ADVANCE CARE PLANNING): ICD-10-CM

## 2019-12-09 LAB
ANION GAP SERPL CALC-SCNC: 4 MMOL/L (ref 3–18)
BUN SERPL-MCNC: 20 MG/DL (ref 7–18)
BUN/CREAT SERPL: 22 (ref 12–20)
CALCIUM SERPL-MCNC: 9.6 MG/DL (ref 8.5–10.1)
CHLORIDE SERPL-SCNC: 106 MMOL/L (ref 100–111)
CO2 SERPL-SCNC: 30 MMOL/L (ref 21–32)
CREAT SERPL-MCNC: 0.9 MG/DL (ref 0.6–1.3)
EST. AVERAGE GLUCOSE BLD GHB EST-MCNC: 151 MG/DL
GLUCOSE SERPL-MCNC: 178 MG/DL (ref 74–99)
HBA1C MFR BLD: 6.9 % (ref 4.2–5.6)
POTASSIUM SERPL-SCNC: 4.1 MMOL/L (ref 3.5–5.5)
SODIUM SERPL-SCNC: 140 MMOL/L (ref 136–145)

## 2019-12-09 PROCEDURE — 80048 BASIC METABOLIC PNL TOTAL CA: CPT

## 2019-12-09 PROCEDURE — 83036 HEMOGLOBIN GLYCOSYLATED A1C: CPT

## 2019-12-09 PROCEDURE — 36415 COLL VENOUS BLD VENIPUNCTURE: CPT

## 2019-12-09 RX ORDER — PHENOL/SODIUM PHENOLATE
20 AEROSOL, SPRAY (ML) MUCOUS MEMBRANE 2 TIMES DAILY
Qty: 180 TAB | Refills: 1 | Status: SHIPPED | OUTPATIENT
Start: 2019-12-09 | End: 2020-06-22 | Stop reason: SDUPTHER

## 2019-12-09 NOTE — PROGRESS NOTES
SUBJECTIVE     Chief Complaint   Patient presents with    Annual Wellness Visit         Hypertension    Diabetes    Cholesterol Problem       HPI:     She went to ED for chest pain on 6/26/2019. She does not have any further chect pains. No shortness of breath, nausea, vomiting, diaphoresis, back or abdominal pain. She does have GERD and feels Prilosec 20 mg is not enough. Her BP is being treated with amlodipine, losartan and hydrolosatchlorothiazide. Her glucose is running good with life style and Glucophage. No hypoglycemia  symptoms. She has stopped Lipitor because it is causing muscle ache and stiffness. She feels better now. She is following diet for lipids and DM. Her last LDL was good. She had fine-needle aspiration of her thyroid gland last year. The results were benign. She is going to follow-up with the surgeon for further care. In the meantime her thyroid function panel was normal.    She is on CPAP for BRIAN. No other Systemic, Cardiovascular or Respiratory symptoms. Past Medical History:   Diagnosis Date    Anemia     Chronic allergic rhinitis     Diabetes ([facility] [address]     Glaucoma     Hypercholesterolemia     Hypertension     Iron deficiency     Multiple thyroid nodules     neg (FNA)    BRIAN (obstructive sleep apnea)     Sleep apnea     Thyroid disease     lumps on thyroid    Trigger finger        Current Outpatient Medications   Medication Sig    psyllium husk (METAMUCIL PO) Take 1 Cap by mouth daily.  Omeprazole delayed release (PRILOSEC D/R) 20 mg tablet Take 1 Tab by mouth daily.  clobetasol (TEMOVATE) 0.05 % topical cream Apply 1 g to affected area two (2) times a day.  amLODIPine (NORVASC) 2.5 mg tablet Take 1 Tab by mouth.  hydroCHLOROthiazide (MICROZIDE) 12.5 mg capsule Take 12.5 mg by mouth every other day.  latanoprost (XALATAN) 0.005 % ophthalmic solution Xalatan 0.005 % eye drops    1 drop every day by ophthalmic route.     losartan (COZAAR) 100 mg tablet Take 1 Tab by mouth daily.  glucose blood VI test strips (FREESTYLE LITE STRIPS) strip Use as directed to check blood sugar once a day    metFORMIN (GLUCOPHAGE) 500 mg tablet Take 1 Tab by mouth two (2) times daily (with meals).  meclizine (ANTIVERT) 25 mg tablet Take 1 Tab by mouth three (3) times daily as needed.  Blood-Glucose Meter (FREESTYLE LITE METER) monitoring kit Use as directed to check blood sugar once a day    Lancets (FREESTYLE LANCETS) misc Use as directed to check blood sugar once a day. No current facility-administered medications for this visit. Allergies   Allergen Reactions    Latex, Natural Rubber Other (comments)    Adhesive Contact Dermatitis     LEAVES DA ON SKIN    USE PAPER TAPE       ROS:   Constitutional: No fever, chills, night sweats, malaise. Ear/Nose/Throat: No ear/ throat pain, lesions, unusual discharge, new speaking or hearing problems, nose bleed. Cardiovascular: No angina, palpitations, PND, orthopnea, lightheadedness, edema, claudication. Respiratory: No dyspnea, wheeze, pleurisy, hemoptysis, unusual cough or sputum. Gastrointestinal: No nausea/ vomiting, bowel habit change, pain, ERA symptoms, melena, hematochezia, anorexia. Psychiatric: No agitation, confusion/disorientation, suicidal or homicidal ideation. Musculoskeletal: No focal weakness. No other complaints. OBJECTIVE   Constitutional: General Appearance: well developed, overweight, nontoxic, in no acute distress. Visit Vitals  /80   Pulse 84   Temp 97.9 °F (36.6 °C) (Oral)   Resp 18   Ht 5' 7\" (1.702 m)   Wt 192 lb (87.1 kg)   SpO2 98%   BMI 30.07 kg/m²     Pulmonary: Respiratory effort: normal; no dyspnea, no retractions, no accessory muscle use. Auscultation: normal & symmetrical air exchange; no rales, no rhonchi, no wheeze; no rubs     Cardiovascular: Palpation: PMI not displaced or enlarged, no thrills or heaves.  Auscultation: RRR; no murmur, rubs or gallops. Extremities: no edema, no active varicosity. GI[de-identified] Normal bowel sounds. No masses; upper RUQ tenderness lateral to xyphoid; no rebound/rigidity; no CVA tenderness. Psychiatric[de-identified] Oriented to time, place and person. Normal mood, no agitation or anxiety. Normal affect. Pleasant and cooperative. Neurological[de-identified] CN I to XII: intact. DTR: symmetrical & normal.    Musculoskeletal[de-identified]   NC/AT,neck is supple. Diabetic foot exam:     Left Foot:   Visual Exam: normal  S/P bunionectomy          Pulse DP: 2+ (normal)   Filament test: normal sensation    Vibratory sensation: normal      Right Foot:   Visual Exam: normal  S/P bunionectomy          Pulse DP: 2+ (normal)   Filament test: normal sensation    Vibratory sensation: normal      Lab Results   Component Value Date/Time    WBC 4.4 (L) 06/10/2019 08:14 AM    HGB 13.3 06/10/2019 08:14 AM    HCT 38.5 06/10/2019 08:14 AM    PLATELET 864 08/13/4464 08:14 AM    MCV 78.7 06/10/2019 08:14 AM     Lab Results   Component Value Date/Time    Sodium 141 06/10/2019 08:14 AM    Potassium 4.6 06/10/2019 08:14 AM    Chloride 105 06/10/2019 08:14 AM    CO2 30 06/10/2019 08:14 AM    Anion gap 6 06/10/2019 08:14 AM    Glucose 129 (H) 06/10/2019 08:14 AM    BUN 18 06/10/2019 08:14 AM    Creatinine 0.90 06/10/2019 08:14 AM    BUN/Creatinine ratio 20 06/10/2019 08:14 AM    GFR est AA >60 06/10/2019 08:14 AM    GFR est non-AA 60 (L) 06/10/2019 08:14 AM    Calcium 9.2 06/10/2019 08:14 AM    Bilirubin, total 0.4 06/10/2019 08:14 AM    AST (SGOT) 16 06/10/2019 08:14 AM    Alk.  phosphatase 82 06/10/2019 08:14 AM    Protein, total 7.2 06/10/2019 08:14 AM    Albumin 3.6 06/10/2019 08:14 AM    Globulin 3.6 06/10/2019 08:14 AM    A-G Ratio 1.0 06/10/2019 08:14 AM    ALT (SGPT) 24 06/10/2019 08:14 AM     Lab Results   Component Value Date/Time    Cholesterol, total 182 09/10/2019 08:50 AM    HDL Cholesterol 93 (H) 09/10/2019 08:50 AM    LDL, calculated 79.8 09/10/2019 08:50 AM    VLDL, calculated 9.2 09/10/2019 08:50 AM    Triglyceride 46 09/10/2019 08:50 AM    CHOL/HDL Ratio 2.0 09/10/2019 08:50 AM     Lab Results   Component Value Date/Time    Hemoglobin A1c 6.6 (H) 09/10/2019 08:50 AM    Hemoglobin A1c (POC) 6.9 02/21/2018 12:10 PM      Lab Results   Component Value Date/Time    TSH 1.28 06/10/2019 08:14 AM    T4, Free 0.9 06/10/2019 08:14 AM       CBC WITH DIFFERENTIAL AUTO (06/26/2019 5:37 PM EDT)  CBC WITH DIFFERENTIAL AUTO (06/26/2019 5:37 PM EDT)   Component Value Ref Range Performed At [facility]   WBC x 10*3 6.3 4.0 - 11.0 K/uL [facility]     RBC x 10^6 5.09 3.80 - 5.20 M/uL [facility]     HGB 13.8 11.7 - 16.1 g/dL [facility]     HCT 40.0 35.1 - 48.3 % [facility]     MCV 79 (L) 80 - 95 fL [facility]     MCH 27 26 - 34 pg [facility]     MCHC 35 31 - 36 g/dL [facility]     RDW 13.7 10.0 - 15.5 % [facility]     Platelet 534 636 - 618 K/uL [facility]     MPV 10.0 9.0 - 13.0 fL [facility]     Segmented Neutrophils 34 (L) 40 - 75 % [facility]     Lymphocytes 51 (H) 20 - 45 % [facility]     Monocytes 13 (H) 3 - 12 % [facility]     Eosinophil 2 0 - 6 % [facility]     Basophils 1 0 - 2 % [facility]     Absolute Neutrophils 2.1 1.8 - 7.7 K/uL [facility]     Absolute Lymphocytes 3.2 1.0 - 4.8 K/uL [facility]     Absolute Monocyte Count 0.8 0.1 - 1.0 K/uL [facility]     Absolute Eosinophil 0.1 0.0 - 0.5 K/uL [facility]     Absolute Basophil Count 0.0 0.0 - 0.2 K/uL [facility] BASIC METABOLIC PANEL (59/64/2741 5:37 PM EDT)  BASIC METABOLIC PANEL (53/15/1035 5:37 PM EDT)   Component Value Ref Range Performed At [facility]   Potassium 4.1 3.5 - 5.5 mmol/L [facility]     Sodium 138 133 - 145 mmol/L [facility]     Chloride 100 98 - 110 mmol/L [facility]     Glucose 89 70 - 99 mg/dL [facility]     Calcium 10.1 8.4 - 10.5 mg/dL [facility]     BUN 22 6 - 22 mg/dL [facility]     Creatinine 0.8 0.8 - 1.4 mg/dL [facility]     CO2 21 20 - 32 mmol/L [facility]     eGFR  >60.0 >60.0 [facility]     eGFR Non African American >60.0 >60.0 [facility]     Anion Gap 17.5 mmol/L [facility]           EKG 6/26/19  Sinus rhythm-normal P axis  Probable left atrial enlargement  Left anterior fascicular block  Probable anteroseptal infarct.  significant change since prior tracing  1/9/18. CXR 6/26/19  No active cardiopulmonary disease.     Nuc Stress Test 7/17/19:  THIS MYOCARDIAL PERFUSION STUDY IS ABNORMAL   THIS IS A LOW RISK STUDY   Mildly Abnormal Myocardial Perfusion Study   There is a small size, mild severity, reversible apical defect , possibly ischemic   LVEF 69% with normal wall motion    ASSESSMENT     1. Encounter for Medicare annual wellness exam    2. Type 2 diabetes mellitus without complication, without long-term current use of insulin ([facility] [address]    3. Essential hypertension    4. Hyperlipidemia, unspecified hyperlipidemia type    5. Myalgia due to statin    6. Gastroesophageal reflux disease, esophagitis presence not specified    7. ACP (advance care planning)        PLAN     Pharmacologic Management: Medications reviewed with the patient. Increase Prilosec to 20 mg BID.     Orders Placed This Encounter    MICROALBUMIN, UR, RAND W/ MICROALB/CREAT RATIO    METABOLIC PANEL, BASIC    HEMOGLOBIN A1C WITH EAG    REFERRAL TO GASTROENTEROLOGY    HM DIABETES FOOT EXAM    Omeprazole delayed release (PRILOSEC D/R) 20 mg tablet       Discussed DDx, follow-up & work-up. Discussed risk/benefit & side effect of treatment. Reflux regimen. Low Salt ADA diet & graduated excecise. Follow up as planned, prn sooner. Regular BS check at home and notify MD of results prn. Follow-up for regular visit, as needed sooner. PRN to ED. Health risk from non adherence discussed. Patent voiced understanding. Follow-up and Dispositions    · Return in about 3 months (around 3/9/2020).        Emeka Ji MD

## 2019-12-09 NOTE — ACP (ADVANCE CARE PLANNING)
Advance Care Planning (ACP) Provider Note - Comprehensive     Date of ACP Conversation: 12/09/19  Persons included in Conversation:  patient  Length of ACP Conversation in minutes:  16 minutes    Authorized Decision Maker (If the patient is incapable of making informed decisions): N/A          The following General ACP was discussed with:patient     - Importance of advance care planning, including choosing a healthcare agent to  communicate patient's healthcare decisions if patient lost the ability to make decisions, such as after a sudden illness or accident. - Understanding of the healthcare agent role was assessed and information provided. - Exploration of values, goals, and preferences if recovery is not expected, even with continued medical treatment in the event of: Imminent death and/or Severe permanent brain injury. \"In these circumstances, what matters most to you? \":  Care focused more on comfort or quality of life. \"What, if any, treatments would you want to avoid? \": Life Prolonging Measures    - Opportunity offered to explore how cultural, Sikhism, spiritual, or personal beliefs would affect decisions for future care. Review of Existing Advance Directive:    \"What information were you given about medical decisions to consider before completing your advance directive? \": Do not remember; done by an  several years ago. \"What is your understanding of your agent's willingness to honor your wishes, even if he/she may not agree with them? \": will honor  \"Does this advance directive still reflect your preferences? \": Yes    New form in updating given to patient. For Serious or Chronic Illness: The following items were discussed with the patient who verbalized understanding:    - Understanding of medical condition.      - Understanding of CPR, goals and expected outcomes, benefits and burdens discussed.     - Information on CPR success rates provided (e.g. for CPR in hospital, survival to d/c at two weeks is 22%, for chronically ill or elderly/frail survival is less than 3%); the patient was asked to communicate understanding of information in his/her own words. - Explored fears and concerns regarding CPR or possible outcomes    Interventions Provided:  Recommended completion of Advance Directive form after review of ACP materials and conversation with prospective healthcare agent. Recommended communicating the plan and making copies for the healthcare agent, personal physician, and others as appropriate. Recommended review of completed ACP document annually or upon change in health status. Reviewed existing Advance Directive. Summary:  The patient was advised that:  She may like to appoint a person as her medical decision maker in the event that she can no longer do so. She may appoint a secondary person also. She is encouraged to make decision at this time re: if she wants life prolonging measures in the event-     A) her death is imminent and medical treatment will not help her recover. B) her condition makes her unaware of herself or her surroundings and she cannot interact with others. Patient would like to appoint her [place] as her medical decision maker in the event that she can no longer do so. Phone number is [phone]. Her secondary person is her Vee Barrett. Phone number is [phone]. If her death is imminent and medical treatment will not help her recover, the patient does not want life prolonging measures. If her condition makes her unaware of self or surroundings and she cannot interact with others, the patient does not want life prolonging measures. She is encouraged bring her existing or updated advance directive to the next visit.

## 2019-12-09 NOTE — PROGRESS NOTES
This is the Subsequent Medicare Annual Wellness Exam, performed 12 months or more after the Initial AWV or the last Subsequent AWV    I have reviewed the patient's medical history in detail and updated the computerized patient record. History     Past Medical History:   Diagnosis Date    Anemia     Chronic allergic rhinitis     Diabetes ([address]     Glaucoma     Hypercholesterolemia     Hypertension     Iron deficiency     Multiple thyroid nodules     neg (FNA)    BRIAN (obstructive sleep apnea)     Sleep apnea     Thyroid disease     lumps on thyroid    Trigger finger       Past Surgical History:   Procedure Laterality Date    HX BUNIONECTOMY  1995    HX CATARACT REMOVAL  2012    correction    HX HERNIA REPAIR  2002    HX HYSTERECTOMY       Current Outpatient Medications   Medication Sig Dispense Refill    psyllium husk (METAMUCIL PO) Take 1 Cap by mouth daily.  Omeprazole delayed release (PRILOSEC D/R) 20 mg tablet Take 1 Tab by mouth daily. 90 Tab 1    clobetasol (TEMOVATE) 0.05 % topical cream Apply 1 g to affected area two (2) times a day.  amLODIPine (NORVASC) 2.5 mg tablet Take 1 Tab by mouth.  hydroCHLOROthiazide (MICROZIDE) 12.5 mg capsule Take 12.5 mg by mouth every other day.  latanoprost (XALATAN) 0.005 % ophthalmic solution Xalatan 0.005 % eye drops    1 drop every day by ophthalmic route.  losartan (COZAAR) 100 mg tablet Take 1 Tab by mouth daily. 90 Tab 3    glucose blood VI test strips (FREESTYLE LITE STRIPS) strip Use as directed to check blood sugar once a day 100 Strip 3    metFORMIN (GLUCOPHAGE) 500 mg tablet Take 1 Tab by mouth two (2) times daily (with meals). 180 Tab 3    meclizine (ANTIVERT) 25 mg tablet Take 1 Tab by mouth three (3) times daily as needed.  30 Tab 0    Blood-Glucose Meter (FREESTYLE LITE METER) monitoring kit Use as directed to check blood sugar once a day 1 Kit 0    Lancets (FREESTYLE LANCETS) misc Use as directed to check blood sugar once a day. 100 Each 3     Allergies   Allergen Reactions    Latex, Natural Rubber Other (comments)    Adhesive Contact Dermatitis     LEAVES DA ON SKIN    USE PAPER TAPE     History reviewed. No pertinent family history. Social History     Tobacco Use    Smoking status: Former Smoker     Packs/day: 0.25     Last attempt to quit: 1970     Years since quittin.9    Smokeless tobacco: Never Used    Tobacco comment: Quit smoking    Substance Use Topics    Alcohol use: No     Patient Active Problem List   Diagnosis Code    Hypertension I10    Diabetes mellitus ([facility] [address] E11.9    Encounter to establish care Z76.89    Tennis elbow M77.10    Trigger finger M65.30    Tendonitis of elbow, right M77.8    Hyperlipemia E78.5    Hot flashes, menopausal N95.1    Vertigo R42    Chronic allergic rhinitis J30.9    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, 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    Vertigo, aural, left H81.312    Thyroid nodule E04.1    Right upper quadrant abdominal pain R10.11    Body mass index (bmi) 27.0-27.9, adult Z68.27    Chest pain R07.9    Incisional hernia K43.2    Menopausal and postmenopausal disorder N95.9       Depression Risk Factor Screening:     3 most recent PHQ Screens 2019   Little interest or pleasure in doing things Not at all   Feeling down, depressed, irritable, or hopeless Not at all   Total Score PHQ 2 0     Alcohol Risk Factor Screening:     Alcohol Risk Factor Screening:   Do you average 1 drink per night or more than 7 drinks a week:  No    On any one occasion in the past three months have you have had more than 2 drinks containing alcohol:  No    Functional Ability and Level of Safety:   Hearing Loss  Hearing is good. Activities of Daily Living  The home contains: [employer]  Patient does total self care    Fall Risk  Fall Risk Assessment, last 12 mths 2019   Able to walk? Yes   Fall in past 12 months?  No   Fall with injury? -   Number of falls in past 12 months -   Fall Risk Score -       Abuse Screen  Patient is not abused    Cognitive Screening   Evaluation of Cognitive Function:  Has your family/caregiver stated any concerns about your memory: no  Cognition: Normal    Patient Care Team   Patient Care Team:  Joao Preston MD as PCP - General (Family Practice)  Joao Preston MD as PCP - [facility] Empaneled Provider  Afshan Lowe MD (Cardiology)  Stefania Light MD (Ophthalmology)  Freddy Baker MD (Dermatology)    Assessment/Plan   Education and counseling provided:  Are appropriate based on today's review and evaluation    Diagnoses and all orders for this visit:    1. Encounter for Medicare annual wellness exam    2. Type 2 diabetes mellitus without complication, without long-term current use of insulin ([facility])  -      DIABETES FOOT EXAM  -     MICROALBUMIN, UR, RAND W/ MICROALB/CREAT RATIO; Future  -     METABOLIC PANEL, BASIC; Future  -     HEMOGLOBIN A1C WITH EAG; Future    3. Essential hypertension  -     METABOLIC PANEL, BASIC; Future    4. Hyperlipidemia, unspecified hyperlipidemia type    5. Myalgia due to statin    6. Gastroesophageal reflux disease, esophagitis presence not specified  -     REFERRAL TO GASTROENTEROLOGY  -     Omeprazole delayed release (PRILOSEC D/R) 20 mg tablet; Take 1 Tab by mouth two (2) times a day.     7. ACP (advance care planning)        Health Maintenance Due   Topic Date Due    FOOT EXAM Q1  12/03/2019    MICROALBUMIN Q1  12/03/2019    MEDICARE YEARLY EXAM  12/04/2019       Anahy Finn MD

## 2019-12-09 NOTE — PATIENT INSTRUCTIONS
Medicare Wellness Visit, Female The best way to live healthy is to have a lifestyle where you eat a well-balanced diet, exercise regularly, limit alcohol use, and quit all forms of tobacco/nicotine, if applicable. Regular preventive services are another way to keep healthy. Preventive services (vaccines, screening tests, monitoring & exams) can help personalize your care plan, which helps you manage your own care. Screening tests can find health problems at the earliest stages, when they are easiest to treat. [facility] follows the current, evidence-based guidelines published by the [facility] ([facility]) when recommending preventive services for our patients. Because we follow these guidelines, sometimes recommendations change over time as research supports it. (For example, mammograms used to be recommended annually. Even though Medicare will still pay for an annual mammogram, the newer guidelines recommend a mammogram every two years for women of average risk.) Of course, you and your doctor may decide to screen more often for some diseases, based on your risk and your health status. Preventive services for you include: - Medicare offers their members a free annual wellness visit, which is time for you and your primary care provider to discuss and plan for your preventive service needs. Take advantage of this benefit every year! 
-All adults over the age of 72 should receive the recommended pneumonia vaccines. Current USPSTF guidelines recommend a series of two vaccines for the best pneumonia protection.  
-All adults should have a flu vaccine yearly and a tetanus vaccine every 10 years. All adults age 61 and older should receive a shingles vaccine once in their lifetime.   
-A bone mass density test is recommended when a woman turns 65 to screen for osteoporosis. This test is only recommended one time, as a screening. Some providers will use this same test as a disease monitoring tool if you already have osteoporosis. -All adults age 38-68 who are overweight should have a diabetes screening test once every three years.  
-Other screening tests and preventive services for persons with diabetes include: an eye exam to screen for diabetic retinopathy, a kidney function test, a foot exam, and stricter control over your cholesterol.  
-Cardiovascular screening for adults with routine risk involves an electrocardiogram (ECG) at intervals determined by your doctor.  
-Colorectal cancer screenings should be done for adults age 54-65 with no increased risk factors for colorectal cancer. There are a number of acceptable methods of screening for this type of cancer. Each test has its own benefits and drawbacks. Discuss with your doctor what is most appropriate for you during your annual wellness visit. The different tests include: colonoscopy (considered the best screening method), a fecal occult blood test, a fecal DNA test, and sigmoidoscopy. -Breast cancer screenings are recommended every other year for women of normal risk, age 54-69. 
-Cervical cancer screenings for women over age 72 are only recommended with certain risk factors.  
-All adults born between [facility] should be screened once for Hepatitis C. Here is a list of your current Health Maintenance items (your personalized list of preventive services) with a due date: 
Health Maintenance Due Topic Date Due  
 Diabetic Foot Care  12/03/2019  Albumin Urine Test  12/03/2019 [facility] Annual Well Visit  12/04/2019 Preventing Falls: Care Instructions Your Care Instructions Getting around your home safely can be a challenge if you have injuries or health problems that make it easy for you to fall. Loose rugs and furniture in walkways are among the dangers for many older people who have problems walking or who have poor eyesight.  People who have conditions such as arthritis, osteoporosis, or dementia also have to be careful not to fall. You can make your home safer with a few simple measures. Follow-up care is a key part of your treatment and safety. Be sure to make and go to all appointments, and call your doctor if you are having problems. It's also a good idea to know your test results and keep a list of the medicines you take. How can you care for yourself at home? Taking care of yourself · You may get dizzy if you do not drink enough water. To prevent dehydration, drink plenty of fluids, enough so that your urine is light yellow or clear like water. Choose water and other caffeine-free clear liquids. If you have kidney, heart, or liver disease and have to limit fluids, talk with your doctor before you increase the amount of fluids you drink. · Exercise regularly to improve your strength, muscle tone, and balance. Walk if you can. Swimming may be a good choice if you cannot walk easily. · Have your vision and hearing checked each year or any time you notice a change. If you have trouble seeing and hearing, you might not be able to avoid objects and could lose your balance. · Know the side effects of the medicines you take. Ask your doctor or pharmacist whether the medicines you take can affect your balance. Sleeping pills or sedatives can affect your balance. · Limit the amount of alcohol you drink. Alcohol can impair your balance and other senses. · Ask your doctor whether calluses or corns on your feet need to be removed. If you wear loose-fitting shoes because of calluses or corns, you can lose your balance and fall. · Talk to your doctor if you have numbness in your feet. Preventing falls at home · Remove raised doorway thresholds, throw rugs, and clutter. Repair loose carpet or raised areas in the floor. · Move furniture and electrical cords to keep them out of walking paths.  
· Use nonskid floor wax, and wipe up spills right away, especially on ceramic tile floors. · If you use a walker or cane, put rubber tips on it. If you use crutches, clean the bottoms of them regularly with an abrasive pad, such as steel wool. · Keep your house well lit, especially [place], and outside walkways. Use night-lights in areas such as hallways and bathrooms. Add extra light switches or use remote switches (such as switches that go on or off when you clap your hands) to make it easier to turn lights on if you have to get up during the night. · Install sturdy handrails on stairways. · Move items in your cabinets so that the things you use a lot are on the lower shelves (about waist level). · Keep a cordless phone and a flashlight with new batteries by your bed. If possible, put a phone in each of the main rooms of your house, or carry a cell phone in case you fall and cannot reach a phone. Or, you can wear a device around your neck or wrist. You push a button that sends a signal for help. · Wear low-heeled shoes that fit well and give your feet good support. Use footwear with nonskid soles. Check the heels and soles of your shoes for wear. Repair or replace worn heels or soles. · Do not wear socks without shoes on wood floors. · Walk on the grass when the sidewalks are slippery. If you live in an area that gets snow and ice in the winter, sprinkle salt on slippery steps and sidewalks. Preventing falls in the bath · Install grab bars and nonskid mats inside and outside your shower or tub and near the toilet and sinks. · Use shower chairs and bath benches. · Use a hand-held shower head that will allow you to sit while showering. · Get into a tub or shower by putting the weaker leg in first. Get out of a tub or shower with your strong side first. 
· Repair loose toilet seats and consider installing a raised toilet seat to make getting on and off the toilet easier. · Keep your bathroom door unlocked while you are in the shower. Where can you learn more? Go to http://yusra-maria l.info/. Enter [id number] [id number] in the search box to learn more about \"Preventing Falls: Care Instructions. \" Current as of: May 12, 2017 Content Version: 11.4 © 9046-3613 [employer]+. Care instructions adapted under license by [employer] (which disclaims liability or warranty for this information). If you have questions about a medical condition or this instruction, always ask your healthcare professional. [address] any warranty or liability for your use of this information. Preventing Outdoor Falls: Care Instructions Your Care Instructions Worries about falls don't need to keep you indoors. Outdoor activities like walking have big benefits for your health. You will need to watch your step and learn a few safety measures. If you are worried about having a fall outdoors, ask your doctor about exercises, classes, or physical therapy that may help. You can learn ways to gain strength, flexibility, and balance. Ask if it might help to use a cane or walker. You can make your time outdoors safer with a few simple measures. Follow-up care is a key part of your treatment and safety. Be sure to make and go to all appointments, and call your doctor if you are having problems. It's also a good idea to know your test results and keep a list of the medicines you take. How can you prevent falls outdoors? · Wear shoes with firm soles and low heels. If you have to walk on an icy surface, use grippers that can be worn over your shoes in bad weather. · Be extra careful if weather is bad. Walk on the grass when the sidewalks are slick. If you live in a place that gets snow and ice in the winter, sprinkle salt on slippery stairs and sidewalks. · Be careful getting on or off buses and trains or getting in and out of cars. If handrails are available, use them. · Be careful when you cross the street.  Look for crosswalks or places where curb cuts or ramps are present. · Try not to hurry, especially if you are carrying something. · Be cautious in parking lots or garages. There may be curbs or changes in pavement, or the height of the pavement may vary. · Make sure to wear the correct eyeglasses, if you need them. Reading glasses or bifocals can make it harder to see hazards that might be in your way. · If you are walking outdoors for exercise, try to: 
¨ Walk in well-lighted, well-maintained areas. These include high school or college tracks, shopping malls, and public spaces. ¨ Walk with a partner. ¨ Watch out for cracked sidewalks, curbs, changes in the height of the pavement, exposed tree roots, and debris such as fallen leaves or branches. Where can you learn more? Go to http://yusraIci Montreuilmaria l.info/. Enter R991 in the search box to learn more about \"Preventing Outdoor Falls: Care Instructions. \" Current as of: May 12, 2017 Content Version: 11.4 © 5467-0884 [employer]. Care instructions adapted under license by [employer] (which disclaims liability or warranty for this information). If you have questions about a medical condition or this instruction, always ask your healthcare professional. [address] any warranty or liability for your use of this information. How to Get Up Safely After a Fall: Care Instructions Your Care Instructions If you have injuries, health problems, or other reasons that may make it easy for you to fall at home, it is a good idea to learn how to get up safely after a fall. Learning how to get up correctly can help you avoid making an injury worse. Also, knowing what to do if you cannot get up can help you stay safe until help arrives. Follow-up care is a key part of your treatment and safety.  Be sure to make and go to all appointments, and call your doctor if you are having problems. It's also a good idea to know your test results and keep a list of the medicines you take. How can you care for yourself after a fall? If you think you can get up First lie still for a few minutes and think about how you feel. If your body feels okay and you think you can get up safely, follow the rest of the steps below: 1. Look for a chair or other piece of furniture that is close to you. 2. Roll onto your side and rest. Roll by turning your head in the direction you want to roll, move your shoulder and arm, then hip and leg in the same direction. 3. Lie still for a moment to let your blood pressure adjust. 
4. Slowly push your upper body up, lift your head, and take a moment to rest. 
5. Slowly get up on your hands and knees, and crawl to the chair or other stable piece of furniture. 6. Put your hands on the chair. 7. Move one foot forward, and place it flat on the floor. Your other leg should be bent with the knee on the floor. 8. Rise slowly, turn your body, and sit in the chair. Stay seated for a bit and think about how you feel. Call for help. Even if you feel okay, let someone know what happened to you. You might not know that you have a serious injury. If you cannot get up 1. If you think you are injured after a fall or you cannot get up, try not to panic. 2. Call out for help. 3. If you have a phone within reach or you have an emergency call device, use it to call for help. 4. If you do not have a phone within reach, try to slide yourself toward it. If you cannot get to the phone, try to slide toward a door or window or a place where you think you can be heard. 5. [place] or use an object to make noise so someone might hear you. 6. If you can reach something that you can use for a pillow, place it under your head. Try to stay warm by covering yourself with a blanket or clothing while you wait for help. When should you call for help? Call 911 anytime you think you may need emergency care. For example, call if: 
 · You passed out (lost consciousness). · You cannot get up after a fall. · You have severe pain. Call your doctor now or seek immediate medical care if: 
 · You have new or worse pain. · You are dizzy or lightheaded. · You hit your head. Watch closely for changes in your health, and be sure to contact your doctor if: 
 · You do not get better as expected. Where can you learn more? Go to http://yusra-maria l.info/. Enter P288 in the search box to learn more about \"How to Get Up Safely After a Fall: Care Instructions. \" Current as of: May 12, 2017 Content Version: 11.4 © 4834-0111 [employer]. Care instructions adapted under license by [employer] (which disclaims liability or warranty for this information). If you have questions about a medical condition or this instruction, always ask your healthcare professional. [place] [address] any warranty or liability for your use of this information. Advance Care Planning: Care Instructions Your Care Instructions It can be hard to live with an illness that cannot be cured. But if your health is getting worse, you may want to make decisions about end-of-life care. Planning for the end of your life does not mean that you are giving up. It is a way to make sure that your wishes are met. Clearly stating your wishes can make it easier for your loved ones. Making plans while you are still able may also ease your mind and make your final days less stressful and more meaningful. Follow-up care is a key part of your treatment and safety. Be sure to make and go to all appointments, and call your doctor if you are having problems. It's also a good idea to know your test results and keep a list of the medicines you take. What can you do to plan for the end of life? · You can bring these issues up with your doctor. You do not need to wait until your doctor starts the conversation. You might start with \"I would not be willing to live with . Evette Sow Evette Sow Evette Sow \" When you complete this sentence it helps your doctor understand your wishes. · Talk openly and honestly with your doctor. This is the best way to understand the decisions you will need to make as your health changes. Know that you can always change your mind. · Ask your doctor about commonly used life-support measures. These include tube feedings, breathing machines, and fluids given through a vein (IV). Understanding these treatments will help you decide whether you want them. · You may choose to have these life-supporting treatments for a limited time. This allows a trial period to see whether they will help you. You may also decide that you want your doctor to take only certain measures to keep you alive. It is important to spell out these conditions so that your doctor and family understand them. · Talk to your doctor about how long you are likely to live. He or she may be able to give you an idea of what usually happens with your specific illness. · Think about preparing papers that state your wishes. This way there will not be any confusion about what you want. You can change your instructions at any time. Which papers should you prepare? Advance directives are legal papers that tell doctors how you want to be cared for at the end of your life. You do not need a  to write these papers. Ask your doctor or your state health department for information on how to write your advance directives. They may have the forms for each of these types of papers. Make sure your doctor has a copy of these on file, and give a copy to a family member or close friend. · Consider a do-not-resuscitate order (DNR). This order asks that no extra treatments be done if your heart stops or you stop breathing.  Extra treatments may include cardiopulmonary resuscitation (CPR), electrical shock to restart your heart, or a machine to breathe for you. If you decide to have a DNR order, ask your doctor to explain and write it. Place the order in your home where everyone can easily see it. · Consider a living will. A living will explains your wishes about life support and other treatments at the end of your life if you become unable to speak for yourself. Living reyes tell doctors to use or not use treatments that would keep you alive. You must have one or two witnesses or a notary present when you sign this form. · Consider a durable power of  for health care. This allows you to name a person to make decisions about your care if you are not able to. Most people ask a close friend or family member. Talk to this person about the kinds of treatments you want and those that you do not want. Make sure this person understands your wishes. These legal papers are simple to change. Tell your doctor what you want to change, and ask him or her to make a note in your medical file. Give your family updated copies of the papers. Where can you learn more? Go to http://yusra-maria l.info/. Enter P184 in the search box to learn more about \"Advance Care Planning: Care Instructions. \" Current as of: September 24, 2016 Content Version: 11.4 © 6483-9867 [employer]. Care instructions adapted under license by [employer] (which disclaims liability or warranty for this information). If you have questions about a medical condition or this instruction, always ask your healthcare professional. [place] [address] any warranty or liability for your use of this information. Learning About Durable Power of  for Health Care What is a durable power of  for health care?  
 
A durable power of  for health care is one type of the legal forms called advance directives. It lets you decide who you want to make treatment decisions for you if you cannot speak or decide for yourself. The person you choose is called your health care agent. Another type of advance directive is a living will. It lets you write down what kinds of treatment or life support you want or do not want. What should you think about when choosing a health care agent? Choose your health care agent carefully. This person may or may not be a family member. Talk to the person before you make your final decision. Make sure he or she is comfortable with this responsibility. It's a good idea to choose someone who: · Is at least 25years old. · Knows you well and understands what makes life meaningful for you. · Understands your Shinto and moral values. · Will do what you want, not what he or she wants. · Will be able to make difficult choices at a stressful time. · Will be able to refuse or stop treatment, if that is what you would want, even if you could die. · Will be firm and confident with health professionals if needed. · Will ask questions to get necessary information. · Lives near you or agrees to travel to you if needed. Your family may help you make medical decisions while you can still be part of that process. But it is important to choose one person to be your health care agent in case you are not able to make decisions for yourself. If you don't fill out the legal form and name a health care agent, the decisions your family can make may be limited. Who will make decisions for you if you do not have a health care agent? If you don't have a health care agent or a living will, your family members may disagree about your medical care. And then some medical professionals who may not know you as well might have to make decisions for you. In some cases, a  makes the decisions.  
When you name a health care agent, it is very clear who has the power to make health decisions for you. How do you name a health care agent? You name your health care agent on a legal form. It is usually called a durable power of  for health care. Ask your hospital, state bar association, or office on aging where to find these forms. You must sign the form to make it legal. Some states require you to get the form notarized. This means that a person called a  watches you sign the form and then he or she signs the form. Some states also require that two or more witnesses sign the form. Be sure to tell your family members and doctors who your health care agent is. Keep your forms in a safe place. But make sure that your loved ones know where the forms are. This could be in your desk where you keep other important papers. Make sure your doctor has a copy of your forms. Where can you learn more? Go to http://yusra-maria l.info/. Enter [id number] in the search box to learn more about \"Learning About Durable Power of  for [facility]. \" Current as of: September 24, 2016 Content Version: 11.4 © 2107-9655 [employer]. Care instructions adapted under license by [employer] (which disclaims liability or warranty for this information). If you have questions about a medical condition or this instruction, always ask your healthcare professional. [address] any warranty or liability for your use of this information. Deciding About Life-Prolonging Treatment Deciding About Life-Prolonging Treatment What is life-prolonging treatment? There are many kinds of treatment that can help you live longer. These may be needed for only a short time until your illness improves. Or you may use them over the long term to help keep you alive. Some treatments include the use of: · Medicines to slow the progress of certain diseases, such as heart disease, diabetes, cancer, AIDS, or Alzheimer's disease. · Antibiotics to treat serious infections, such as pneumonia. · Dialysis to clean your blood if your kidneys stop working. · A breathing machine to help you breathe if you can't breathe on your own. This machine pumps air into your lungs through a tube put into your throat. · A feeding tube or an intravenous (IV) line to give you food and fluids if you can't eat or drink. · Cardiopulmonary resuscitation (CPR) to try to restart your heart. The decision to receive treatments that may help you live longer is a personal one. You may want your doctor to do everything possible to keep you alive, even when your chance for recovery is small. Or you may choose to only have care to manage your pain and other symptoms. What are key points about this decision? · If there is a good chance that your illness can be cured or managed, your doctor may advise you to first try available treatments. If these don't work, then you might think about stopping treatment. · If you stop treatment, you will still receive care that focuses on pain relief and comfort. · A decision to stop treatment that keeps you alive does not have to be permanent. You can always change your mind if your health starts to improve. · Even though treatment focuses on helping you live longer, it may cause side effects that can greatly affect your quality of life. And it could affect how you spend time with your family and friends. · If you still have personal goals that you want to pursue, you may want treatment that keeps you alive long enough to reach them. Why might you choose life-prolonging treatment? · There is a good chance that your illness can be cured or managed. · You think you can manage the possible side effects of treatment. · You don't think treatment will get in the way of your quality of life. · You have personal goals that you still want to pursue and achieve. Why might you choose to stop life-prolonging treatment? · Your chance of surviving your illness is very low. · You have tried all possible treatments for your illness, but they have not helped. · You can no longer deal with the side effects of treatment. · You have already met the goals you set out to achieve in your life. Your decision Thinking about the facts and your feelings can help you make a decision that is right for you. Be sure you understand the benefits and risks of your options, and think about what else you need to do before you make the decision. Where can you learn more? Go to http://yusra-maria l.info/. Enter P289 in the search box to learn more about \"Deciding About Life-Prolonging Treatment. \" Current as of: September 24, 2016 Content Version: 11.4 © 0774-8495 Healthwise, Incorporated. Care instructions adapted under license by [employer] (which disclaims liability or warranty for this information). If you have questions about a medical condition or this instruction, always ask your healthcare professional. [place] [address] any warranty or liability for your use of this information. Vickey Bennett 50[address] What is a living will? A living will is a legal form you use to write down the kind of care you want at the end of your life. It is used by the health professionals who will treat you if you aren't able to decide for yourself. If you put your wishes in writing, your loved ones and others will know what kind of care you want. They won't need to guess. This can ease your mind and be helpful to others. A living will is not the same as an estate or property will. An estate will explains what you want to happen with your money and property after you die. Is a living will a legal document? A living will is a legal document. Each state has its own laws about living reyes.  If you move to another state, make sure that your living will is legal in the state where you now live. Or you might use a universal form that has been approved by many states. This kind of form can sometimes be completed and stored online. Your electronic copy will then be available wherever you have a connection to the Internet. In most cases, doctors will respect your wishes even if you have a form from a different state. · You don't need an  to complete a living will. But legal advice can be helpful if your state's laws are unclear, your health history is complicated, or your family can't agree on what should be in your living will. · You can change your living will at any time. Some people find that their wishes about end-of-life care change as their health changes. · In addition to making a living will, think about completing a medical power of  form. This form lets you name the person you want to make end-of-life treatment decisions for you (your \"health care agent\") if you're not able to. Many hospitals and nursing homes will give you the forms you need to complete a living will and a medical power of . · Your living will is used only if you can't make or communicate decisions for yourself anymore. If you become able to make decisions again, you can accept or refuse any treatment, no matter what you wrote in your living will. · Your state may offer an online registry. This is a place where you can store your living will online so the doctors and nurses who need to treat you can find it right away. What should you think about when creating a living will? Talk about your end-of-life wishes with your family members and your doctor. Let them know what you want. That way the people making decisions for you won't be surprised by your choices. Think about these questions as you make your living will: · Do you know enough about life support methods that might be used?  If not, talk to your doctor so you know what might be done if you can't breathe on your own, your heart stops, or you're unable to swallow. · What things would you still want to be able to do after you receive life-support methods? Would you want to be able to walk? To speak? To eat on your own? To live without the help of machines? · If you have a choice, where do you want to be cared for? In your home? At a hospital or nursing home? · Do you want certain Roman Catholic practices performed if you become very ill? · If you have a choice at the end of your life, where would you prefer to die? At home? In a hospital or nursing home? Somewhere else? · Would you prefer to be buried or cremated? · Do you want your organs to be donated after you die? What should you do with your living will? · Make sure that your family members and your health care agent have copies of your living will. · Give your doctor a copy of your living will to keep in your medical record. If you have more than one doctor, make sure that each one has a copy. · You may want to put a copy of your living will where it can be easily found. Where can you learn more? Go to http://yusra-maria l.info/. Enter S518 in the search box to learn more about \"Learning About Living [place]. \" Current as of: September 24, 2016 Content Version: 11.4 © 1756-5846 [facility]. Care instructions adapted under license by [employer] (which disclaims liability or warranty for this information). If you have questions about a medical condition or this instruction, always ask your healthcare professional. [place] [address] any warranty or liability for your use of this information. Advance Directives: Care Instructions Your Care Instructions An advance directive is a legal way to state your wishes at the end of your life. It tells your family and your doctor what to do if you can no longer say what you want. There are two main types of advance directives. You can change them any time that your wishes change. · A living will tells your family and your doctor your wishes about life support and other treatment. · A durable power of  for health care lets you name a person to make treatment decisions for you when you can't speak for yourself. This person is called a health care agent. If you do not have an advance directive, decisions about your medical care may be made by a doctor or a  who doesn't know you. It may help to think of an advance directive as a gift to the people who care for you. If you have one, they won't have to make tough decisions by themselves. Follow-up care is a key part of your treatment and safety. Be sure to make and go to all appointments, and call your doctor if you are having problems. It's also a good idea to know your test results and keep a list of the medicines you take. How can you care for yourself at home? · Discuss your wishes with your loved ones and your doctor. This way, there are no surprises. · Many states have a unique form. Or you might use a universal form that has been approved by many states. This kind of form can sometimes be completed and stored online. Your electronic copy will then be available wherever you have a connection to the Internet. In most cases, doctors will respect your wishes even if you have a form from a different state. · You don't need a  to do an advance directive. But you may want to get legal advice. · Think about these questions when you prepare an advance directive: ¨ Who do you want to make decisions about your medical care if you are not able to? Many people choose a family member or close friend. ¨ Do you know enough about life support methods that might be used? If not, talk to your doctor so you understand. ¨ What are you most afraid of that might happen?  You might be afraid of having pain, losing your independence, or being kept alive by machines. ¨ Where would you prefer to die? Choices include your home, a hospital, or a nursing home. ¨ Would you like to have information about hospice care to support you and your family? ¨ Do you want to donate organs when you die? ¨ Do you want certain Adventist practices performed before you die? If so, put your wishes in the advance directive. · Read your advance directive every year, and make changes as needed. When should you call for help? Be sure to contact your doctor if you have any questions. Where can you learn more? Go to http://yusra-maria l.info/. Enter R264 in the search box to learn more about \"Advance Directives: Care Instructions. \" Current as of: September 24, 2016 Content Version: 11.4 © 3127-2214 Healthwise, Incorporated. Care instructions adapted under license by [employer] (which disclaims liability or warranty for this information). If you have questions about a medical condition or this instruction, always ask your healthcare professional. [address] any warranty or liability for your use of this information.

## 2019-12-10 NOTE — PROGRESS NOTES
Hemoglobin A1c is a little higher and fasting glucose was high. Otherwise blood work is stable. Please follow diet and exercise as tolerated; otherwise we need to increase your medication.

## 2019-12-11 ENCOUNTER — TELEPHONE (OUTPATIENT)
Dept: FAMILY MEDICINE CLINIC | Age: 84
End: 2019-12-11

## 2019-12-11 NOTE — TELEPHONE ENCOUNTER
----- Message from Natasha Curtis MD sent at 12/10/2019  3:20 PM EST -----  Hemoglobin A1c is a little higher and fasting glucose was high. Otherwise blood work is stable. Please follow diet and exercise as tolerated; otherwise we need to increase your medication.

## 2020-01-02 DIAGNOSIS — E11.9 TYPE 2 DIABETES MELLITUS WITHOUT COMPLICATION, WITHOUT LONG-TERM CURRENT USE OF INSULIN (HCC): ICD-10-CM

## 2020-01-02 RX ORDER — METFORMIN HYDROCHLORIDE 500 MG/1
500 TABLET ORAL 2 TIMES DAILY WITH MEALS
Qty: 180 TAB | Refills: 3 | Status: SHIPPED | OUTPATIENT
Start: 2020-01-02 | End: 2020-01-02 | Stop reason: SDUPTHER

## 2020-01-02 RX ORDER — METFORMIN HYDROCHLORIDE 500 MG/1
500 TABLET ORAL 2 TIMES DAILY WITH MEALS
Qty: 180 TAB | Refills: 3 | Status: SHIPPED | OUTPATIENT
Start: 2020-01-02 | End: 2020-11-23 | Stop reason: SDUPTHER

## 2020-01-02 NOTE — TELEPHONE ENCOUNTER
Pt calling to request medication refill of:  Requested Prescriptions     Pending Prescriptions Disp Refills    metFORMIN (GLUCOPHAGE) 500 mg tablet 180 Tab 3     Sig: Take 1 Tab by mouth two (2) times daily (with meals). Indications: type 2 diabetes mellitus          be sent to NO PHARMACY PREFERENCE  Pt has about BLANK tabs remaining. Pts last appt was 12/9  next appt sched for 3/9. Advised pt of 72 hour time frame for refill requests. Pt advised to contact pharmacy in 72 hours. Please advise.

## 2020-04-07 ENCOUNTER — VIRTUAL VISIT (OUTPATIENT)
Dept: FAMILY MEDICINE CLINIC | Facility: CLINIC | Age: 85
End: 2020-04-07

## 2020-04-07 DIAGNOSIS — E11.9 TYPE 2 DIABETES MELLITUS WITHOUT COMPLICATION, WITHOUT LONG-TERM CURRENT USE OF INSULIN (HCC): Primary | ICD-10-CM

## 2020-04-07 DIAGNOSIS — I10 ESSENTIAL HYPERTENSION: ICD-10-CM

## 2020-04-07 RX ORDER — AMLODIPINE BESYLATE 2.5 MG/1
2.5 TABLET ORAL DAILY
Qty: 90 TAB | Refills: 3 | Status: SHIPPED | OUTPATIENT
Start: 2020-04-07 | End: 2020-04-08 | Stop reason: SDUPTHER

## 2020-04-07 NOTE — PROGRESS NOTES
Consent:  She and/or health care decision maker is aware that that she may receive a bill for this telephone service, depending on her insurance coverage, and has provided verbal consent to proceed: Yes    SUBJECTIVE     Chief Complaint   Patient presents with    Hypertension    Diabetes    Cholesterol Problem    GERD       HPI:     Her BP is being treated with amlodipine, losartan and hydrolosatchlorothiazide. BP today-126/75 [id number] #    Her glucose is running good with life style and Glucophage. No hypoglycemia symptoms. Glucose running 110-120. Her GERD symptoms are better controlled with increased Prilosec. No more chest pains. She has stopped Lipitor because it is causing muscle ache and stiffness. She feels better now. She is following diet for lipids and DM. Her last LDL was good. She is on CPAP for BRIAN. No other Systemic, Cardiovascular or Respiratory symptoms. Past Medical History:   Diagnosis Date    Anemia     Chronic allergic rhinitis     Diabetes ([address]     Glaucoma     Hypercholesterolemia     Hypertension     Iron deficiency     Multiple thyroid nodules     neg (FNA)    BRIAN (obstructive sleep apnea)     Sleep apnea     Thyroid disease     lumps on thyroid    Trigger finger        Current Outpatient Medications   Medication Sig    amLODIPine (NORVASC) 2.5 mg tablet Take 1 Tab by mouth daily.  metFORMIN (GLUCOPHAGE) 500 mg tablet Take 1 Tab by mouth two (2) times daily (with meals). Indications: type 2 diabetes mellitus    Omeprazole delayed release (PRILOSEC D/R) 20 mg tablet Take 1 Tab by mouth two (2) times a day.  psyllium husk (METAMUCIL PO) Take 1 Cap by mouth daily.  clobetasol (TEMOVATE) 0.05 % topical cream Apply 1 g to affected area two (2) times a day.  hydroCHLOROthiazide (MICROZIDE) 12.5 mg capsule Take 12.5 mg by mouth every other day.     latanoprost (XALATAN) 0.005 % ophthalmic solution Xalatan 0.005 % eye drops    1 drop every day by ophthalmic route.  losartan (COZAAR) 100 mg tablet Take 1 Tab by mouth daily.  glucose blood VI test strips (FREESTYLE LITE STRIPS) strip Use as directed to check blood sugar once a day    meclizine (ANTIVERT) 25 mg tablet Take 1 Tab by mouth three (3) times daily as needed.  Blood-Glucose Meter (FREESTYLE LITE METER) monitoring kit Use as directed to check blood sugar once a day    Lancets (FREESTYLE LANCETS) misc Use as directed to check blood sugar once a day. No current facility-administered medications for this visit. Allergies   Allergen Reactions    Latex, Natural Rubber Other (comments)    Adhesive Contact Dermatitis     LEAVES DA ON SKIN    USE PAPER TAPE       ROS:   Constitutional: No fever, chills, night sweats, malaise. Ear/Nose/Throat: No ear/ throat pain, lesions, unusual discharge, new speaking or hearing problems, nose bleed. Cardiovascular: No angina, palpitations, PND, orthopnea, lightheadedness, edema, claudication. Respiratory: No dyspnea, wheeze, pleurisy, hemoptysis, unusual cough or sputum. Gastrointestinal: No nausea/ vomiting, bowel habit change, pain, ERA symptoms, melena, hematochezia, anorexia. Psychiatric: No agitation, confusion/disorientation, suicidal or homicidal ideation. Musculoskeletal: No focal weakness. No other complaints. OBJECTIVE:    Constitutional: in no acute distress. Pulmonary: Respiratory effort: normal; no dyspnea. Psychiatric[de-identified] Pleasant and cooperative. Oriented to time, place and person.     Neurological[de-identified] Speech normal.      Lab Results   Component Value Date/Time    WBC 4.4 (L) 06/10/2019 08:14 AM    HGB 13.3 06/10/2019 08:14 AM    HCT 38.5 06/10/2019 08:14 AM    PLATELET 622 97/98/7149 08:14 AM    MCV 78.7 06/10/2019 08:14 AM     Lab Results   Component Value Date/Time    Sodium 140 12/09/2019 09:48 AM    Potassium 4.1 12/09/2019 09:48 AM    Chloride 106 12/09/2019 09:48 AM    CO2 30 12/09/2019 09:48 AM    Anion gap 4 12/09/2019 09:48 AM    Glucose 178 (H) 12/09/2019 09:48 AM    BUN 20 (H) 12/09/2019 09:48 AM    Creatinine 0.90 12/09/2019 09:48 AM    BUN/Creatinine ratio 22 (H) 12/09/2019 09:48 AM    GFR est AA >60 12/09/2019 09:48 AM    GFR est non-AA 60 (L) 12/09/2019 09:48 AM    Calcium 9.6 12/09/2019 09:48 AM    Bilirubin, total 0.4 06/10/2019 08:14 AM    AST (SGOT) 16 06/10/2019 08:14 AM    Alk. phosphatase 82 06/10/2019 08:14 AM    Protein, total 7.2 06/10/2019 08:14 AM    Albumin 3.6 06/10/2019 08:14 AM    Globulin 3.6 06/10/2019 08:14 AM    A-G Ratio 1.0 06/10/2019 08:14 AM    ALT (SGPT) 24 06/10/2019 08:14 AM     Lab Results   Component Value Date/Time    Cholesterol, total 182 09/10/2019 08:50 AM    HDL Cholesterol 93 (H) 09/10/2019 08:50 AM    LDL, calculated 79.8 09/10/2019 08:50 AM    VLDL, calculated 9.2 09/10/2019 08:50 AM    Triglyceride 46 09/10/2019 08:50 AM    CHOL/HDL Ratio 2.0 09/10/2019 08:50 AM     Lab Results   Component Value Date/Time    Hemoglobin A1c 6.9 (H) 12/09/2019 09:48 AM    Hemoglobin A1c (POC) 6.9 02/21/2018 12:10 PM      Lab Results   Component Value Date/Time    TSH 1.28 06/10/2019 08:14 AM    T4, Free 0.9 06/10/2019 08:14 AM       ASSESSMENT     1. Type 2 diabetes mellitus without complication, without long-term current use of insulin ([address]    2. Essential hypertension        PLAN     Pharmacologic Management: Medications reviewed with the patient. No change. Orders Placed This Encounter    amLODIPine (NORVASC) 2.5 mg tablet       Regular BS and BPcheck at home and notify MD of results prn. Follow-up discussed. I affirm this is a Patient Initiated Episode with an Established Patient who has not had a related appointment within my department in the past 7 days or scheduled within the next 24 hours. Total Time: minutes: 5-10 minutes     Follow-up and Dispositions    · Return in about 1 month (around 5/7/2020).            Andra Guerin MD

## 2020-04-10 RX ORDER — AMLODIPINE BESYLATE 2.5 MG/1
2.5 TABLET ORAL DAILY
Qty: 90 TAB | Refills: 1 | Status: SHIPPED | OUTPATIENT
Start: 2020-04-10 | End: 2021-03-18

## 2020-05-07 ENCOUNTER — VIRTUAL VISIT (OUTPATIENT)
Dept: FAMILY MEDICINE CLINIC | Facility: CLINIC | Age: 85
End: 2020-05-07

## 2020-05-07 DIAGNOSIS — Z99.89 OSA ON CPAP: ICD-10-CM

## 2020-05-07 DIAGNOSIS — E11.9 TYPE 2 DIABETES MELLITUS WITHOUT COMPLICATION, WITHOUT LONG-TERM CURRENT USE OF INSULIN (HCC): Primary | ICD-10-CM

## 2020-05-07 DIAGNOSIS — K21.9 GASTROESOPHAGEAL REFLUX DISEASE, ESOPHAGITIS PRESENCE NOT SPECIFIED: ICD-10-CM

## 2020-05-07 DIAGNOSIS — I10 ESSENTIAL HYPERTENSION: ICD-10-CM

## 2020-05-07 DIAGNOSIS — G47.33 OSA ON CPAP: ICD-10-CM

## 2020-05-07 DIAGNOSIS — E78.5 HYPERLIPIDEMIA, UNSPECIFIED HYPERLIPIDEMIA TYPE: ICD-10-CM

## 2020-05-07 NOTE — PROGRESS NOTES
Consent:   Henrietta Delatorre, who was seen by synchronous (real-time) audio-video technology, and/or her healthcare decision maker, is aware that this patient-initiated, Telehealth encounter on 5/7/2020 is a billable service, with coverage as determined by her insurance carrier. She is aware that she may receive a bill and has provided verbal consent to proceed: Yes. SUBJECTIVE     Chief Complaint   Patient presents with    Hypertension    Diabetes    Cholesterol Problem    GERD       HPI:     Her BP is being treated with amlodipine, losartan and hydrolosatchlorothiazide. BP is running good at home. Her glucose is running good with life style and Glucophage. No hypoglycemia symptoms. Glucose running 100-120. Her GERD symptoms are better controlled. No chest pains since Prilosec was increased. She has stopped Lipitor because it is causing muscle ache and stiffness. She feels better now. She is following diet for lipids and DM. Her last LDL was good. She is on CPAP for BRIAN. She had some tongue irritation about two weeks ago; but it is much better now. No problem eating or pain/ soreness. No other Systemic, Cardiovascular or Respiratory symptoms. Past Medical History:   Diagnosis Date    Anemia     Chronic allergic rhinitis     Diabetes ([address]     Glaucoma     Hypercholesterolemia     Hypertension     Iron deficiency     Multiple thyroid nodules     neg (FNA)    BRIAN (obstructive sleep apnea)     Sleep apnea     Thyroid disease     lumps on thyroid    Trigger finger        Current Outpatient Medications   Medication Sig    glucose blood VI test strips (FreeStyle Lite Strips) strip Use as directed to check blood sugar once a day    amLODIPine (NORVASC) 2.5 mg tablet Take 1 Tab by mouth daily.  metFORMIN (GLUCOPHAGE) 500 mg tablet Take 1 Tab by mouth two (2) times daily (with meals).  Indications: type 2 diabetes mellitus    Omeprazole delayed release (PRILOSEC D/R) 20 mg tablet Take 1 Tab by mouth two (2) times a day.  psyllium husk (METAMUCIL PO) Take 1 Cap by mouth daily.  clobetasol (TEMOVATE) 0.05 % topical cream Apply 1 g to affected area two (2) times a day.  hydroCHLOROthiazide (MICROZIDE) 12.5 mg capsule Take 12.5 mg by mouth every other day.  latanoprost (XALATAN) 0.005 % ophthalmic solution Xalatan 0.005 % eye drops    1 drop every day by ophthalmic route.  losartan (COZAAR) 100 mg tablet Take 1 Tab by mouth daily.  meclizine (ANTIVERT) 25 mg tablet Take 1 Tab by mouth three (3) times daily as needed.  Blood-Glucose Meter (FREESTYLE LITE METER) monitoring kit Use as directed to check blood sugar once a day    Lancets (FREESTYLE LANCETS) misc Use as directed to check blood sugar once a day. No current facility-administered medications for this visit. Allergies   Allergen Reactions    Latex, Natural Rubber Other (comments)    Adhesive Contact Dermatitis     LEAVES DA ON SKIN    USE PAPER TAPE       ROS:   Constitutional: No fever, chills, night sweats, malaise. Ear/Nose/Throat: No ear/sinus/ throat pain, lesions, unusual discharge, new speaking or hearing problems, nose bleed. Cardiovascular: No angina, palpitations, PND, orthopnea, lightheadedness, edema, claudication. Respiratory: No dyspnea, wheeze, pleurisy, hemoptysis, unusual cough or sputum. Gastrointestinal: No nausea/ vomiting, bowel habit change, pain, ERA symptoms, melena, hematochezia, anorexia. Psychiatric: No agitation, confusion/disorientation, suicidal or homicidal ideation. Musculoskeletal: No focal weakness. No other complaints. OBJECTIVE:    Constitutional: General Appearance:  Appears well-developed and well nourished. Nontoxic, in no acute distress. Mental status:  Alert and awake. Oriented to person/place/time. Able to follow commands. Eyes:   Sclera  Normal.    HENT:  Normocephalic, atraumatic. No Facial Asymmetry.   No gaze palsy    Pulmonary: Respiratory effort: normal; no dyspnea. Neurological:   Speech normal.      Psychiatric:  Pleasant and cooperative. Normal Affect. No Hallucinations. Musculoskeletal[de-identified] neck is supple. Lab Results   Component Value Date/Time    WBC 4.4 (L) 06/10/2019 08:14 AM    HGB 13.3 06/10/2019 08:14 AM    HCT 38.5 06/10/2019 08:14 AM    PLATELET 133 66/53/4081 08:14 AM    MCV 78.7 06/10/2019 08:14 AM     Lab Results   Component Value Date/Time    Sodium 140 12/09/2019 09:48 AM    Potassium 4.1 12/09/2019 09:48 AM    Chloride 106 12/09/2019 09:48 AM    CO2 30 12/09/2019 09:48 AM    Anion gap 4 12/09/2019 09:48 AM    Glucose 178 (H) 12/09/2019 09:48 AM    BUN 20 (H) 12/09/2019 09:48 AM    Creatinine 0.90 12/09/2019 09:48 AM    BUN/Creatinine ratio 22 (H) 12/09/2019 09:48 AM    GFR est AA >60 12/09/2019 09:48 AM    GFR est non-AA 60 (L) 12/09/2019 09:48 AM    Calcium 9.6 12/09/2019 09:48 AM    Bilirubin, total 0.4 06/10/2019 08:14 AM    AST (SGOT) 16 06/10/2019 08:14 AM    Alk. phosphatase 82 06/10/2019 08:14 AM    Protein, total 7.2 06/10/2019 08:14 AM    Albumin 3.6 06/10/2019 08:14 AM    Globulin 3.6 06/10/2019 08:14 AM    A-G Ratio 1.0 06/10/2019 08:14 AM    ALT (SGPT) 24 06/10/2019 08:14 AM     Lab Results   Component Value Date/Time    Cholesterol, total 182 09/10/2019 08:50 AM    HDL Cholesterol 93 (H) 09/10/2019 08:50 AM    LDL, calculated 79.8 09/10/2019 08:50 AM    VLDL, calculated 9.2 09/10/2019 08:50 AM    Triglyceride 46 09/10/2019 08:50 AM    CHOL/HDL Ratio 2.0 09/10/2019 08:50 AM     Lab Results   Component Value Date/Time    Hemoglobin A1c 6.9 (H) 12/09/2019 09:48 AM    Hemoglobin A1c (POC) 6.9 02/21/2018 12:10 PM      Lab Results   Component Value Date/Time    TSH 1.28 06/10/2019 08:14 AM    T4, Free 0.9 06/10/2019 08:14 AM       ASSESSMENT     1. Type 2 diabetes mellitus without complication, without long-term current use of insulin ([facility] [address]    2. Essential hypertension    3.  Hyperlipidemia, unspecified hyperlipidemia type    4. Gastroesophageal reflux disease, esophagitis presence not specified    5. BRIAN on CPAP        PLAN     Pharmacologic Management: Medications reviewed with the patient. No change. Reviewed the due lab testing with patient: will get U/A, urine microalb, CBC, CMP, LP, TSH, U/A, HgbA1c at next OV in 2 months. Regular BS and BPcheck at home and notify MD of results prn. Follow-up discussed. Sooner if tongue symptoms return. Follow-up and Dispositions    · Return in about 2 months (around 7/7/2020) for fasting. Noe Novoa is a 81 y.o. female who was evaluated by a video visit encounter for concerns as above. A caregiver was present when appropriate. Due to this being a TeleHealth encounter ([facility] [place] [place]), evaluation of the following organ systems was limited: Vitals/Constitutional/EENT/Resp/CV/GI//MS/Neuro/Skin/Heme-Lymph-Imm. Pursuant to the emergency declaration under the [facility], [facility] waiver authority and the [employer] and [employer] Act, this Virtual  Visit was conducted, with patient's (and/or legal guardian's) consent, to reduce the patient's risk of exposure to COVID-19 and provide necessary medical care. Services were provided through a video synchronous discussion virtually to substitute for in-person clinic visit. Patient and provider were located at their individual homes.       Perez Forrester MD

## 2020-06-22 DIAGNOSIS — K21.9 GASTROESOPHAGEAL REFLUX DISEASE, ESOPHAGITIS PRESENCE NOT SPECIFIED: ICD-10-CM

## 2020-06-22 RX ORDER — PHENOL/SODIUM PHENOLATE
20 AEROSOL, SPRAY (ML) MUCOUS MEMBRANE 2 TIMES DAILY
Qty: 180 TAB | Refills: 1 | Status: SHIPPED | OUTPATIENT
Start: 2020-06-22 | End: 2020-12-28 | Stop reason: SDUPTHER

## 2020-07-07 ENCOUNTER — VIRTUAL VISIT (OUTPATIENT)
Dept: FAMILY MEDICINE CLINIC | Facility: CLINIC | Age: 85
End: 2020-07-07

## 2020-07-07 DIAGNOSIS — E04.1 THYROID NODULE: ICD-10-CM

## 2020-07-07 DIAGNOSIS — I10 ESSENTIAL HYPERTENSION: ICD-10-CM

## 2020-07-07 DIAGNOSIS — K21.9 GASTROESOPHAGEAL REFLUX DISEASE, ESOPHAGITIS PRESENCE NOT SPECIFIED: ICD-10-CM

## 2020-07-07 DIAGNOSIS — E78.5 HYPERLIPIDEMIA, UNSPECIFIED HYPERLIPIDEMIA TYPE: ICD-10-CM

## 2020-07-07 DIAGNOSIS — E11.9 TYPE 2 DIABETES MELLITUS WITHOUT COMPLICATION, WITHOUT LONG-TERM CURRENT USE OF INSULIN (HCC): Primary | ICD-10-CM

## 2020-07-07 NOTE — PROGRESS NOTES
Consent:   Xochitl Aldana, who was seen by synchronous (real-time) audio-video technology, and/or her healthcare decision maker, is aware that this patient-initiated, Telehealth encounter on 7/7/2020 is a billable service, with coverage as determined by her insurance carrier. She is aware that she may receive a bill and has provided verbal consent to proceed: NA - Consent obtained within past 12 months. SUBJECTIVE     Chief Complaint   Patient presents with    Hypertension    Diabetes    GERD    Cholesterol Problem       HPI:     Her BP is being treated with amlodipine, losartan and hydrolosatchlorothiazide. BP is running good at home. Her glucose is running good with life style and Glucophage. No hypoglycemia symptoms. Glucose running 100-115. Her GERD symptoms are better controlled. No chest pains even after she decreased  Prilosec to 20 daily. She has stopped Lipitor because it is causing muscle ache and stiffness. She feels better now. Her last LDL was good. She is following diet for lipids and DM. She is on CPAP for BRIAN. Her tongue irritation is better. No problem eating or pain/ soreness. No other Systemic, Cardiovascular or Respiratory symptoms. Past Medical History:   Diagnosis Date    Anemia     Chronic allergic rhinitis     Diabetes ([address]     Glaucoma     Hypercholesterolemia     Hypertension     Iron deficiency     Multiple thyroid nodules     neg (FNA)    BRIAN (obstructive sleep apnea)     Sleep apnea     Thyroid disease     lumps on thyroid    Trigger finger        Current Outpatient Medications   Medication Sig    Omeprazole delayed release (PRILOSEC D/R) 20 mg tablet Take 1 Tab by mouth two (2) times a day. (Patient taking differently: Take 20 mg by mouth daily.)    glucose blood VI test strips (FreeStyle Lite Strips) strip Use as directed to check blood sugar once a day    amLODIPine (NORVASC) 2.5 mg tablet Take 1 Tab by mouth daily.     metFORMIN (GLUCOPHAGE) 500 mg tablet Take 1 Tab by mouth two (2) times daily (with meals). Indications: type 2 diabetes mellitus    psyllium husk (METAMUCIL PO) Take 1 Cap by mouth daily.  clobetasol (TEMOVATE) 0.05 % topical cream Apply 1 g to affected area two (2) times a day.  hydroCHLOROthiazide (MICROZIDE) 12.5 mg capsule Take 12.5 mg by mouth every other day.  latanoprost (XALATAN) 0.005 % ophthalmic solution Xalatan 0.005 % eye drops    1 drop every day by ophthalmic route.  losartan (COZAAR) 100 mg tablet Take 1 Tab by mouth daily.  meclizine (ANTIVERT) 25 mg tablet Take 1 Tab by mouth three (3) times daily as needed.  Blood-Glucose Meter (FREESTYLE LITE METER) monitoring kit Use as directed to check blood sugar once a day    Lancets (FREESTYLE LANCETS) misc Use as directed to check blood sugar once a day. No current facility-administered medications for this visit. Allergies   Allergen Reactions    Latex, Natural Rubber Other (comments)    Adhesive Contact Dermatitis     LEAVES DA ON SKIN    USE PAPER TAPE       ROS:   Constitutional: No fever, chills, night sweats, malaise. Ear/Nose/Throat: No ear/sinus/ throat pain, lesions, unusual discharge, new speaking or hearing problems, nose bleed. Cardiovascular: No angina, palpitations, PND, orthopnea, lightheadedness, edema, claudication. Respiratory: No dyspnea, wheeze, pleurisy, hemoptysis, unusual cough or sputum. Gastrointestinal: No nausea/ vomiting, bowel habit change, pain, ERA symptoms, melena, hematochezia, anorexia. Psychiatric: No agitation, confusion/disorientation, suicidal or homicidal ideation. Musculoskeletal: No focal weakness. No other complaints. OBJECTIVE:    Constitutional: General Appearance:  Appears well-developed and well nourished. Nontoxic, in no acute distress. Mental status:  Alert and awake. Oriented to person/place/time. Able to follow commands. Vitals reported by Pt:  /82, IN 84;  Lonia Genera 97F; Weight-185#; Ht-5'8\"    Eyes:   Sclera  Normal.    HENT:  Normocephalic, atraumatic. No Facial Asymmetry. No gaze palsy    Pulmonary: Respiratory effort: normal; no dyspnea. Neurological:   Speech normal.      Psychiatric:  Pleasant and cooperative. Normal Affect. No Hallucinations. Musculoskeletal[de-identified] neck is supple. Lab Results   Component Value Date/Time    WBC 4.4 (L) 06/10/2019 08:14 AM    HGB 13.3 06/10/2019 08:14 AM    HCT 38.5 06/10/2019 08:14 AM    PLATELET 567 75/90/7192 08:14 AM    MCV 78.7 06/10/2019 08:14 AM     Lab Results   Component Value Date/Time    Sodium 140 12/09/2019 09:48 AM    Potassium 4.1 12/09/2019 09:48 AM    Chloride 106 12/09/2019 09:48 AM    CO2 30 12/09/2019 09:48 AM    Anion gap 4 12/09/2019 09:48 AM    Glucose 178 (H) 12/09/2019 09:48 AM    BUN 20 (H) 12/09/2019 09:48 AM    Creatinine 0.90 12/09/2019 09:48 AM    BUN/Creatinine ratio 22 (H) 12/09/2019 09:48 AM    GFR est AA >60 12/09/2019 09:48 AM    GFR est non-AA 60 (L) 12/09/2019 09:48 AM    Calcium 9.6 12/09/2019 09:48 AM    Bilirubin, total 0.4 06/10/2019 08:14 AM    Alk. phosphatase 82 06/10/2019 08:14 AM    Protein, total 7.2 06/10/2019 08:14 AM    Albumin 3.6 06/10/2019 08:14 AM    Globulin 3.6 06/10/2019 08:14 AM    A-G Ratio 1.0 06/10/2019 08:14 AM    ALT (SGPT) 24 06/10/2019 08:14 AM     Lab Results   Component Value Date/Time    Cholesterol, total 182 09/10/2019 08:50 AM    HDL Cholesterol 93 (H) 09/10/2019 08:50 AM    LDL, calculated 79.8 09/10/2019 08:50 AM    VLDL, calculated 9.2 09/10/2019 08:50 AM    Triglyceride 46 09/10/2019 08:50 AM    CHOL/HDL Ratio 2.0 09/10/2019 08:50 AM     Lab Results   Component Value Date/Time    Hemoglobin A1c 6.9 (H) 12/09/2019 09:48 AM    Hemoglobin A1c (POC) 6.9 02/21/2018 12:10 PM      Lab Results   Component Value Date/Time    TSH 1.28 06/10/2019 08:14 AM    T4, Free 0.9 06/10/2019 08:14 AM       ASSESSMENT     1.  Type 2 diabetes mellitus without complication, without long-term current use of insulin ([facility]    2. Essential hypertension    3. Hyperlipidemia, unspecified hyperlipidemia type    4. Gastroesophageal reflux disease, esophagitis presence not specified    5. Thyroid nodule        PLAN     Orders Placed This Encounter    CBC WITH AUTOMATED DIFF    HEMOGLOBIN A1C WITH EAG    METABOLIC PANEL, COMPREHENSIVE    LIPID PANEL    URINALYSIS W/ RFLX MICROSCOPIC    MICROALBUMIN, UR, RAND W/ MICROALB/CREAT RATIO    TSH 3RD GENERATION     Pharmacologic Management: Medications reviewed with the patient. Consider changing Prilosec to Pepcid 20 HS. Regular BS and BP check at home and notify MD of results prn. The patient is advised to continue with low salt ADA diet and exercise as tolerated. Discussed risk/ benefit and side effects of treatment. Discussed differential diagnosis, follow-up and work-up. Patient voiced understanding. Follow-up and Dispositions    · Return in about 3 months (around 10/7/2020). Tonny Guevara is a 81 y.o. female who was evaluated by a video visit encounter for concerns as above. A caregiver was present when appropriate. Due to this being a TeleHealth encounter (During [facility] public health emergency), evaluation of the following organ systems was limited: Vitals/Constitutional/EENT/Resp/CV/GI//MS/Neuro/Skin/Heme-Lymph-Imm. Pursuant to the emergency declaration under the [facility], 1135 waiver authority and the [employer] General Act, this Virtual  Visit was conducted, with patient's (and/or legal guardian's) consent, to reduce the patient's risk of exposure to COVID-19 and provide necessary medical care. Services were provided through a video synchronous discussion virtually to substitute for in-person clinic visit. Patient and provider were located at their individual homes.       Patricia Mae MD

## 2020-07-13 ENCOUNTER — HOSPITAL ENCOUNTER (OUTPATIENT)
Dept: LAB | Age: 85
Discharge: HOME | End: 2020-07-13
Payer: MEDICARE

## 2020-07-13 DIAGNOSIS — E04.1 THYROID NODULE: ICD-10-CM

## 2020-07-13 DIAGNOSIS — E11.9 TYPE 2 DIABETES MELLITUS WITHOUT COMPLICATION, WITHOUT LONG-TERM CURRENT USE OF INSULIN (HCC): ICD-10-CM

## 2020-07-13 DIAGNOSIS — K21.9 GASTROESOPHAGEAL REFLUX DISEASE, ESOPHAGITIS PRESENCE NOT SPECIFIED: ICD-10-CM

## 2020-07-13 DIAGNOSIS — E78.5 HYPERLIPIDEMIA, UNSPECIFIED HYPERLIPIDEMIA TYPE: ICD-10-CM

## 2020-07-13 DIAGNOSIS — I10 ESSENTIAL HYPERTENSION: ICD-10-CM

## 2020-07-13 LAB
ALBUMIN SERPL-MCNC: 3.7 G/DL (ref 3.4–5)
ALBUMIN/GLOB SERPL: 0.9 {RATIO} (ref 0.8–1.7)
ALP SERPL-CCNC: 86 U/L (ref 45–117)
ALT SERPL-CCNC: 22 U/L (ref 13–56)
ANION GAP SERPL CALC-SCNC: 4 MMOL/L (ref 3–18)
APPEARANCE UR: CLEAR
AST SERPL-CCNC: 17 U/L (ref 10–38)
BASOPHILS # BLD: 0 K/UL (ref 0–0.1)
BASOPHILS NFR BLD: 1 % (ref 0–2)
BILIRUB SERPL-MCNC: 0.5 MG/DL (ref 0.2–1)
BILIRUB UR QL: NEGATIVE
BUN SERPL-MCNC: 20 MG/DL (ref 7–18)
BUN/CREAT SERPL: 20 (ref 12–20)
CALCIUM SERPL-MCNC: 9.9 MG/DL (ref 8.5–10.1)
CHLORIDE SERPL-SCNC: 104 MMOL/L (ref 100–111)
CHOLEST SERPL-MCNC: 160 MG/DL
CO2 SERPL-SCNC: 30 MMOL/L (ref 21–32)
COLOR UR: YELLOW
CREAT SERPL-MCNC: 0.99 MG/DL (ref 0.6–1.3)
CREAT UR-MCNC: 91 MG/DL (ref 30–125)
DIFFERENTIAL METHOD BLD: ABNORMAL
EOSINOPHIL # BLD: 0.1 K/UL (ref 0–0.4)
EOSINOPHIL NFR BLD: 1 % (ref 0–5)
ERYTHROCYTE [DISTWIDTH] IN BLOOD BY AUTOMATED COUNT: 14.8 % (ref 11.6–14.5)
EST. AVERAGE GLUCOSE BLD GHB EST-MCNC: 143 MG/DL
GLOBULIN SER CALC-MCNC: 4 G/DL (ref 2–4)
GLUCOSE SERPL-MCNC: 109 MG/DL (ref 74–99)
GLUCOSE UR STRIP.AUTO-MCNC: NEGATIVE MG/DL
HBA1C MFR BLD: 6.6 % (ref 4.2–5.6)
HCT VFR BLD AUTO: 40 % (ref 35–45)
HDLC SERPL-MCNC: 76 MG/DL (ref 40–60)
HDLC SERPL: 2.1 {RATIO} (ref 0–5)
HGB BLD-MCNC: 13.4 G/DL (ref 12–16)
HGB UR QL STRIP: NEGATIVE
KETONES UR QL STRIP.AUTO: NEGATIVE MG/DL
LDLC SERPL CALC-MCNC: 75.6 MG/DL (ref 0–100)
LEUKOCYTE ESTERASE UR QL STRIP.AUTO: NEGATIVE
LIPID PROFILE,FLP: ABNORMAL
LYMPHOCYTES # BLD: 1.9 K/UL (ref 0.9–3.6)
LYMPHOCYTES NFR BLD: 40 % (ref 21–52)
MCH RBC QN AUTO: 26.8 PG (ref 24–34)
MCHC RBC AUTO-ENTMCNC: 33.5 G/DL (ref 31–37)
MCV RBC AUTO: 80 FL (ref 74–97)
MICROALBUMIN UR-MCNC: <0.5 MG/DL (ref 0–3)
MICROALBUMIN/CREAT UR-RTO: NORMAL MG/G (ref 0–30)
MONOCYTES # BLD: 0.7 K/UL (ref 0.05–1.2)
MONOCYTES NFR BLD: 14 % (ref 3–10)
NEUTS SEG # BLD: 2.1 K/UL (ref 1.8–8)
NEUTS SEG NFR BLD: 44 % (ref 40–73)
NITRITE UR QL STRIP.AUTO: NEGATIVE
PH UR STRIP: 5.5 [PH] (ref 5–8)
PLATELET # BLD AUTO: 281 K/UL (ref 135–420)
PMV BLD AUTO: 10.5 FL (ref 9.2–11.8)
POTASSIUM SERPL-SCNC: 4.8 MMOL/L (ref 3.5–5.5)
PROT SERPL-MCNC: 7.7 G/DL (ref 6.4–8.2)
PROT UR STRIP-MCNC: NEGATIVE MG/DL
RBC # BLD AUTO: 5 M/UL (ref 4.2–5.3)
SODIUM SERPL-SCNC: 138 MMOL/L (ref 136–145)
SP GR UR REFRACTOMETRY: 1.01 (ref 1–1.03)
TRIGL SERPL-MCNC: 42 MG/DL (ref ?–150)
TSH SERPL DL<=0.05 MIU/L-ACNC: 1.01 UIU/ML (ref 0.36–3.74)
UROBILINOGEN UR QL STRIP.AUTO: 0.2 EU/DL (ref 0.2–1)
VLDLC SERPL CALC-MCNC: 8.4 MG/DL
WBC # BLD AUTO: 4.7 K/UL (ref 4.6–13.2)

## 2020-07-13 PROCEDURE — 80053 COMPREHEN METABOLIC PANEL: CPT

## 2020-07-13 PROCEDURE — 80061 LIPID PANEL: CPT

## 2020-07-13 PROCEDURE — 84443 ASSAY THYROID STIM HORMONE: CPT

## 2020-07-13 PROCEDURE — 85025 COMPLETE CBC W/AUTO DIFF WBC: CPT

## 2020-07-13 PROCEDURE — 36415 COLL VENOUS BLD VENIPUNCTURE: CPT

## 2020-07-13 PROCEDURE — 83036 HEMOGLOBIN GLYCOSYLATED A1C: CPT

## 2020-07-13 PROCEDURE — 82043 UR ALBUMIN QUANTITATIVE: CPT

## 2020-07-13 PROCEDURE — 81003 URINALYSIS AUTO W/O SCOPE: CPT

## 2020-08-24 RX ORDER — LOSARTAN POTASSIUM 100 MG/1
100 TABLET ORAL DAILY
Qty: 90 TAB | Refills: 3 | Status: SHIPPED | OUTPATIENT
Start: 2020-08-24

## 2020-08-24 RX ORDER — HYDROCHLOROTHIAZIDE 12.5 MG/1
12.5 CAPSULE ORAL EVERY OTHER DAY
Qty: 45 CAP | Refills: 3 | Status: SHIPPED | OUTPATIENT
Start: 2020-08-24 | End: 2021-03-30 | Stop reason: ALTCHOICE

## 2020-08-24 NOTE — TELEPHONE ENCOUNTER
Requested Prescriptions     Pending Prescriptions Disp Refills    losartan (Cozaar) 100 mg tablet 90 Tab 3     Sig: Take 1 Tab by mouth daily.  hydroCHLOROthiazide (MICROZIDE) 12.5 mg capsule        Sig: Take 1 Cap by mouth every other day.        Future Appointments   Date Time Provider Kelin Wilkins   10/7/2020  9:00 AM Christie Hernandez MD [address] [place] [address]

## 2020-10-07 ENCOUNTER — VIRTUAL VISIT (OUTPATIENT)
Dept: FAMILY MEDICINE CLINIC | Age: 85
End: 2020-10-07
Payer: MEDICARE

## 2020-10-07 DIAGNOSIS — K14.8 DRY TONGUE: ICD-10-CM

## 2020-10-07 DIAGNOSIS — E78.5 HYPERLIPIDEMIA, UNSPECIFIED HYPERLIPIDEMIA TYPE: ICD-10-CM

## 2020-10-07 DIAGNOSIS — E11.9 TYPE 2 DIABETES MELLITUS WITHOUT COMPLICATION, WITHOUT LONG-TERM CURRENT USE OF INSULIN (HCC): Primary | ICD-10-CM

## 2020-10-07 DIAGNOSIS — I10 ESSENTIAL HYPERTENSION: ICD-10-CM

## 2020-10-07 PROCEDURE — G8432 DEP SCR NOT DOC, RNG: HCPCS | Performed by: FAMILY MEDICINE

## 2020-10-07 PROCEDURE — G8399 PT W/DXA RESULTS DOCUMENT: HCPCS | Performed by: FAMILY MEDICINE

## 2020-10-07 PROCEDURE — 99214 OFFICE O/P EST MOD 30 MIN: CPT | Performed by: FAMILY MEDICINE

## 2020-10-07 PROCEDURE — 1090F PRES/ABSN URINE INCON ASSESS: CPT | Performed by: FAMILY MEDICINE

## 2020-10-07 PROCEDURE — G8756 NO BP MEASURE DOC: HCPCS | Performed by: FAMILY MEDICINE

## 2020-10-07 PROCEDURE — 1101F PT FALLS ASSESS-DOCD LE1/YR: CPT | Performed by: FAMILY MEDICINE

## 2020-10-07 PROCEDURE — G8427 DOCREV CUR MEDS BY ELIG CLIN: HCPCS | Performed by: FAMILY MEDICINE

## 2020-10-07 NOTE — PROGRESS NOTES
Consent:   Juliette Rolle, who was seen by synchronous (real-time) audio-video technology, and/or her healthcare decision maker, is aware that this patient-initiated, Telehealth encounter on 10/7/2020 is a billable service, with coverage as determined by her insurance carrier. She is aware that she may receive a bill and has provided verbal consent to proceed: NA - Consent obtained within past 12 months. SUBJECTIVE     Chief Complaint   Patient presents with    Diabetes    Hypertension    Cholesterol Problem       HPI:     Her BP is being treated with amlodipine, losartan and hydrolosatchlorothiazide. BP is running good at home. Her glucose is running good with life style and Glucophage. No hypoglycemia symptoms. Glucose running good 108-125. Glucose today was 124. Her GERD symptoms are better controlled. No chest pains even after she decreased  Prilosec to 20 bid. She has stopped Lipitor because it is causing muscle ache and stiffness. She feels better now. Her recent LDL is good. She is following diet for lipids and DM. She is on CPAP for BRIAN. Her tongue irritation is better; but still has dryness when using CPAP. No problem eating or pain/ soreness. No other Systemic, Cardiovascular or Respiratory symptoms. Past Medical History:   Diagnosis Date    Anemia     Chronic allergic rhinitis     Diabetes ([address]     Glaucoma     Hypercholesterolemia     Hypertension     Iron deficiency     Multiple thyroid nodules     neg (FNA)    BRIAN (obstructive sleep apnea)     Sleep apnea     Thyroid disease     lumps on thyroid    Trigger finger        Current Outpatient Medications   Medication Sig    losartan (Cozaar) 100 mg tablet Take 1 Tab by mouth daily.  hydroCHLOROthiazide (MICROZIDE) 12.5 mg capsule Take 1 Cap by mouth every other day.  Omeprazole delayed release (PRILOSEC D/R) 20 mg tablet Take 1 Tab by mouth two (2) times a day.  (Patient taking differently: Take 20 mg by mouth daily.)    glucose blood VI test strips (FreeStyle Lite Strips) strip Use as directed to check blood sugar once a day    amLODIPine (NORVASC) 2.5 mg tablet Take 1 Tab by mouth daily.  metFORMIN (GLUCOPHAGE) 500 mg tablet Take 1 Tab by mouth two (2) times daily (with meals). Indications: type 2 diabetes mellitus    psyllium husk (METAMUCIL PO) Take 1 Cap by mouth daily.  clobetasol (TEMOVATE) 0.05 % topical cream Apply 1 g to affected area two (2) times a day.  latanoprost (XALATAN) 0.005 % ophthalmic solution Xalatan 0.005 % eye drops    1 drop every day by ophthalmic route.  meclizine (ANTIVERT) 25 mg tablet Take 1 Tab by mouth three (3) times daily as needed.  Blood-Glucose Meter (FREESTYLE LITE METER) monitoring kit Use as directed to check blood sugar once a day    Lancets (FREESTYLE LANCETS) misc Use as directed to check blood sugar once a day. No current facility-administered medications for this visit. Allergies   Allergen Reactions    Latex, Natural Rubber Other (comments)    Adhesive Contact Dermatitis     LEAVES DA ON SKIN    USE PAPER TAPE       ROS:   Constitutional: No fever, chills, night sweats, malaise. Ear/Nose/Throat: No ear/sinus/ throat pain, lesions, unusual discharge, new speaking or hearing problems, nose bleed. Cardiovascular: No angina, palpitations, PND, orthopnea, lightheadedness, edema, claudication. Respiratory: No dyspnea, wheeze, pleurisy, hemoptysis, unusual cough or sputum. Gastrointestinal: No nausea/ vomiting, bowel habit change, pain, ERA symptoms, melena, hematochezia, anorexia. Psychiatric: No agitation, confusion/disorientation, suicidal or homicidal ideation. Musculoskeletal: No focal weakness. No other complaints. OBJECTIVE:    Constitutional: General Appearance:  Appears well-developed and well nourished. Nontoxic, in no acute distress. Mental status:  Alert and awake. Oriented to person/place/time.   Able to follow commands. Vitals reported by Pt:  /80, OK 88; T 97.1F; Weight-190#; Ht-5'8\"    Eyes:   Sclera  Normal.    HENT:  Normocephalic, atraumatic. No Facial Asymmetry. No gaze palsy. Small geographic tongue without any other abnormalities. No induration, erythema. Pulmonary: Respiratory effort: normal; no dyspnea. Neurological:   Speech normal.      Psychiatric:  Pleasant and cooperative. Normal Affect. No Hallucinations. Musculoskeletal[de-identified] neck is supple. Lab Results   Component Value Date/Time    WBC 4.7 07/13/2020 08:17 AM    HGB 13.4 07/13/2020 08:17 AM    HCT 40.0 07/13/2020 08:17 AM    PLATELET 982 97/90/5505 08:17 AM    MCV 80.0 07/13/2020 08:17 AM     Lab Results   Component Value Date/Time    Sodium 138 07/13/2020 08:17 AM    Potassium 4.8 07/13/2020 08:17 AM    Chloride 104 07/13/2020 08:17 AM    CO2 30 07/13/2020 08:17 AM    Anion gap 4 07/13/2020 08:17 AM    Glucose 109 (H) 07/13/2020 08:17 AM    BUN 20 (H) 07/13/2020 08:17 AM    Creatinine 0.99 07/13/2020 08:17 AM    BUN/Creatinine ratio 20 07/13/2020 08:17 AM    GFR est AA >60 07/13/2020 08:17 AM    GFR est non-AA 54 (L) 07/13/2020 08:17 AM    Calcium 9.9 07/13/2020 08:17 AM    Bilirubin, total 0.5 07/13/2020 08:17 AM    Alk.  phosphatase 86 07/13/2020 08:17 AM    Protein, total 7.7 07/13/2020 08:17 AM    Albumin 3.7 07/13/2020 08:17 AM    Globulin 4.0 07/13/2020 08:17 AM    A-G Ratio 0.9 07/13/2020 08:17 AM    ALT (SGPT) 22 07/13/2020 08:17 AM     Lab Results   Component Value Date/Time    Cholesterol, total 160 07/13/2020 08:17 AM    HDL Cholesterol 76 (H) 07/13/2020 08:17 AM    LDL, calculated 75.6 07/13/2020 08:17 AM    VLDL, calculated 8.4 07/13/2020 08:17 AM    Triglyceride 42 07/13/2020 08:17 AM    CHOL/HDL Ratio 2.1 07/13/2020 08:17 AM     Lab Results   Component Value Date/Time    Hemoglobin A1c 6.6 (H) 07/13/2020 08:17 AM    Hemoglobin A1c (POC) 6.9 02/21/2018 12:10 PM      Lab Results   Component Value Date/Time    TSH 1.01 07/13/2020 08:17 AM    T4, Free 0.9 06/10/2019 08:14 AM       ASSESSMENT     1. Type 2 diabetes mellitus without complication, without long-term current use of insulin ([facility] [address]    2. Essential hypertension    3. Hyperlipidemia, unspecified hyperlipidemia type    4. Dry tongue        PLAN     Pharmacologic Management: Medications reviewed with the patient. No change. Regular BS and BP check at home and notify MD of results prn. Follow up with sleep medicine about dry tongue; also with dentist. Use sugarless candy or chewing gum before bedtime to stimulate saliva. Consider referral to ENT. The patient is advised to continue with low salt ADA diet and exercise as tolerated. Discussed risk/ benefit and side effects of treatment. Discussed differential diagnosis, follow-up and work-up. Patient voiced understanding. Follow-up and Dispositions    · Return in about 3 months (around 1/7/2021). Francoise Naqvi is a 81 y.o. female who was evaluated by a video visit encounter for concerns as above. A caregiver was present when appropriate. Due to this being a TeleHealth encounter (During [facility] public health emergency), evaluation of the following organ systems was limited: Vitals/Constitutional/EENT/Resp/CV/GI//MS/Neuro/Skin/Heme-Lymph-Imm. Pursuant to the emergency declaration under the [facility], 1135 waiver authority and the [employer] General Act, this Virtual  Visit was conducted, with patient's (and/or legal guardian's) consent, to reduce the patient's risk of exposure to COVID-19 and provide necessary medical care. Services were provided through a video synchronous discussion virtually to substitute for in-person clinic visit. Patient and provider were located at their individual homes.       Chucho Roland MD

## 2020-11-17 ENCOUNTER — VIRTUAL VISIT (OUTPATIENT)
Dept: FAMILY MEDICINE CLINIC | Age: 85
End: 2020-11-17
Payer: MEDICARE

## 2020-11-17 ENCOUNTER — TELEPHONE (OUTPATIENT)
Dept: FAMILY MEDICINE CLINIC | Age: 85
End: 2020-11-17

## 2020-11-17 ENCOUNTER — DOCUMENTATION ONLY (OUTPATIENT)
Dept: FAMILY MEDICINE CLINIC | Age: 85
End: 2020-11-17

## 2020-11-17 DIAGNOSIS — I83.93 VARICOSE VEINS OF BOTH LOWER EXTREMITIES, UNSPECIFIED WHETHER COMPLICATED: ICD-10-CM

## 2020-11-17 DIAGNOSIS — M79.662 PAIN OF LEFT CALF: ICD-10-CM

## 2020-11-17 DIAGNOSIS — M25.562 ACUTE PAIN OF LEFT KNEE: Primary | ICD-10-CM

## 2020-11-17 PROCEDURE — G8432 DEP SCR NOT DOC, RNG: HCPCS | Performed by: FAMILY MEDICINE

## 2020-11-17 PROCEDURE — G8417 CALC BMI ABV UP PARAM F/U: HCPCS | Performed by: FAMILY MEDICINE

## 2020-11-17 PROCEDURE — G8756 NO BP MEASURE DOC: HCPCS | Performed by: FAMILY MEDICINE

## 2020-11-17 PROCEDURE — G8399 PT W/DXA RESULTS DOCUMENT: HCPCS | Performed by: FAMILY MEDICINE

## 2020-11-17 PROCEDURE — 1090F PRES/ABSN URINE INCON ASSESS: CPT | Performed by: FAMILY MEDICINE

## 2020-11-17 PROCEDURE — 99213 OFFICE O/P EST LOW 20 MIN: CPT | Performed by: FAMILY MEDICINE

## 2020-11-17 PROCEDURE — 1101F PT FALLS ASSESS-DOCD LE1/YR: CPT | Performed by: FAMILY MEDICINE

## 2020-11-17 PROCEDURE — G8427 DOCREV CUR MEDS BY ELIG CLIN: HCPCS | Performed by: FAMILY MEDICINE

## 2020-11-17 PROCEDURE — G8536 NO DOC ELDER MAL SCRN: HCPCS | Performed by: FAMILY MEDICINE

## 2020-11-17 NOTE — PROGRESS NOTES
Consent:   Cody Tyler, who was seen by synchronous (real-time) audio-video technology, and/or her healthcare decision maker, is aware that this patient-initiated, Telehealth encounter on 11/17/2020 is a billable service, with coverage as determined by her insurance carrier. She is aware that she may receive a bill and has provided verbal consent to proceed: NA - Consent obtained within past 12 months. SUBJECTIVE     Chief Complaint   Patient presents with    Leg Pain       HPI:     She has left leg pain for 2 weeks, mostly posterior knee. She says one night she had knee pain while in bed; but otherwise no problem when at rest. She denies any LBP. She has the pain to stand up/ walk or to press on her knee. She has pain in her calf also. She is concerned because she has bilateral varicose veins. She denies any recent injury. She is active, and walks or does stationary bike for exercise. She denies any dyspnea or chest pain. No pain in her varicose veins. No erythema, swelling of Lt knee. No pedal edema. Her BP is being treated with amlodipine, losartan and hydrolosatchlorothiazide. BP is running good at home. Her glucose is running good with life style and Glucophage. No hypoglycemia symptoms. She is on CPAP for BRIAN. No other Systemic, Cardiovascular or Respiratory symptoms. Past Medical History:   Diagnosis Date    Anemia     Chronic allergic rhinitis     Diabetes ([address]     Glaucoma     Hypercholesterolemia     Hypertension     Iron deficiency     Multiple thyroid nodules     neg (FNA)    BRIAN (obstructive sleep apnea)     Sleep apnea     Thyroid disease     lumps on thyroid    Trigger finger        Current Outpatient Medications   Medication Sig    losartan (Cozaar) 100 mg tablet Take 1 Tab by mouth daily.  hydroCHLOROthiazide (MICROZIDE) 12.5 mg capsule Take 1 Cap by mouth every other day.     Omeprazole delayed release (PRILOSEC D/R) 20 mg tablet Take 1 Tab by mouth two (2) times a day. (Patient taking differently: Take 20 mg by mouth daily.)    glucose blood VI test strips (FreeStyle Lite Strips) strip Use as directed to check blood sugar once a day    amLODIPine (NORVASC) 2.5 mg tablet Take 1 Tab by mouth daily.  metFORMIN (GLUCOPHAGE) 500 mg tablet Take 1 Tab by mouth two (2) times daily (with meals). Indications: type 2 diabetes mellitus    psyllium husk (METAMUCIL PO) Take 1 Cap by mouth daily.  clobetasol (TEMOVATE) 0.05 % topical cream Apply 1 g to affected area two (2) times a day.  latanoprost (XALATAN) 0.005 % ophthalmic solution Xalatan 0.005 % eye drops    1 drop every day by ophthalmic route.  meclizine (ANTIVERT) 25 mg tablet Take 1 Tab by mouth three (3) times daily as needed.  Blood-Glucose Meter (FREESTYLE LITE METER) monitoring kit Use as directed to check blood sugar once a day    Lancets (FREESTYLE LANCETS) misc Use as directed to check blood sugar once a day. No current facility-administered medications for this visit. Allergies   Allergen Reactions    Latex, Natural Rubber Other (comments)    Adhesive Contact Dermatitis     LEAVES DA ON SKIN    USE PAPER TAPE       ROS:   Constitutional: No fever, chills, night sweats, malaise. Ear/Nose/Throat: No ear/sinus/ throat pain, lesions, unusual discharge, new speaking or hearing problems, nose bleed. Cardiovascular: No angina, palpitations, PND, orthopnea, lightheadedness, edema, claudication. Respiratory: No dyspnea, wheeze, pleurisy, hemoptysis, unusual cough or sputum. Gastrointestinal: No nausea/ vomiting, bowel habit change, pain, ERA symptoms, melena, hematochezia, anorexia. Psychiatric: No agitation, confusion/disorientation, suicidal or homicidal ideation. Musculoskeletal: Knee area pain as above. No focal weakness. No other complaints. OBJECTIVE:    Constitutional: General Appearance:  Appears well-developed and well nourished.  Nontoxic, in no acute distress. Mental status:  Alert and awake. Oriented to person/place/time. Able to follow commands. Eyes:   Sclera  Normal.    HENT:  Normocephalic, atraumatic. No Facial Asymmetry. No gaze palsy. Small geographic tongue without any other abnormalities. No induration, erythema. Pulmonary: Respiratory effort: normal; no dyspnea. Neurological:   Speech normal.      Psychiatric:  Pleasant and cooperative. Normal Affect. No Hallucinations. Musculoskeletal[de-identified]   Neck is supple. Lt Knee/ leg: no acute changes in appearance; no gross effusion or erythema. Tenderness reported with palpation. Calf tenderness reported on self palpation. No redness. Lab Results   Component Value Date/Time    WBC 4.7 07/13/2020 08:17 AM    HGB 13.4 07/13/2020 08:17 AM    HCT 40.0 07/13/2020 08:17 AM    PLATELET 269 85/47/2704 08:17 AM    MCV 80.0 07/13/2020 08:17 AM     Lab Results   Component Value Date/Time    Sodium 138 07/13/2020 08:17 AM    Potassium 4.8 07/13/2020 08:17 AM    Chloride 104 07/13/2020 08:17 AM    CO2 30 07/13/2020 08:17 AM    Anion gap 4 07/13/2020 08:17 AM    Glucose 109 (H) 07/13/2020 08:17 AM    BUN 20 (H) 07/13/2020 08:17 AM    Creatinine 0.99 07/13/2020 08:17 AM    BUN/Creatinine ratio 20 07/13/2020 08:17 AM    GFR est AA >60 07/13/2020 08:17 AM    GFR est non-AA 54 (L) 07/13/2020 08:17 AM    Calcium 9.9 07/13/2020 08:17 AM    Bilirubin, total 0.5 07/13/2020 08:17 AM    Alk.  phosphatase 86 07/13/2020 08:17 AM    Protein, total 7.7 07/13/2020 08:17 AM    Albumin 3.7 07/13/2020 08:17 AM    Globulin 4.0 07/13/2020 08:17 AM    A-G Ratio 0.9 07/13/2020 08:17 AM    ALT (SGPT) 22 07/13/2020 08:17 AM     Lab Results   Component Value Date/Time    Cholesterol, total 160 07/13/2020 08:17 AM    HDL Cholesterol 76 (H) 07/13/2020 08:17 AM    LDL, calculated 75.6 07/13/2020 08:17 AM    VLDL, calculated 8.4 07/13/2020 08:17 AM    Triglyceride 42 07/13/2020 08:17 AM    CHOL/HDL Ratio 2.1 07/13/2020 08:17 AM Lab Results   Component Value Date/Time    Hemoglobin A1c 6.6 (H) 07/13/2020 08:17 AM    Hemoglobin A1c (POC) 6.9 02/21/2018 12:10 PM      Lab Results   Component Value Date/Time    TSH 1.01 07/13/2020 08:17 AM    T4, Free 0.9 06/10/2019 08:14 AM       ASSESSMENT     1. Acute pain of left knee    2. Pain of left calf    3. Varicose veins of both lower extremities, unspecified whether complicated        PLAN     Pharmacologic Management: Medications reviewed with the patient. Tylenol PRN    Orders Placed This Encounter    REFERRAL TO ORTHOPEDICS     STAT PVL of Lt lower Ext. Referral to orthopedist.    Knee and leg care. Moist heat and posture care to avoid further strain. Follow up as scheduled. PRN to ED. Discussed risk/ benefit and side effects of treatment. Discussed differential diagnosis, follow-up and work-up. Patient voiced understanding. Follow-up and Dispositions    · Return if symptoms worsen or fail to improve. Omar Holley is a 81 y.o. female who was evaluated by a video visit encounter for concerns as above. A caregiver was present when appropriate. Due to this being a TeleHealth encounter ([facility]), evaluation of the following organ systems was limited: Vitals/Constitutional/EENT/Resp/CV/GI//MS/Neuro/Skin/Heme-Lymph-Imm. Pursuant to the emergency declaration under the [facility], 1135 waiver authority and the [employer] Act, this Virtual  Visit was conducted, with patient's (and/or legal guardian's) consent, to reduce the patient's risk of exposure to COVID-19 and provide necessary medical care. Services were provided through a video synchronous discussion virtually to substitute for in-person clinic visit. Patient and provider were located at their individual homes.       Deshaun Li MD

## 2021-01-07 ENCOUNTER — VIRTUAL VISIT (OUTPATIENT)
Dept: FAMILY MEDICINE CLINIC | Age: 86
End: 2021-01-07
Payer: MEDICARE

## 2021-01-07 DIAGNOSIS — T46.6X5A MYALGIA DUE TO STATIN: ICD-10-CM

## 2021-01-07 DIAGNOSIS — J30.9 ALLERGIC RHINITIS, UNSPECIFIED SEASONALITY, UNSPECIFIED TRIGGER: ICD-10-CM

## 2021-01-07 DIAGNOSIS — Z71.89 ACP (ADVANCE CARE PLANNING): ICD-10-CM

## 2021-01-07 DIAGNOSIS — I10 ESSENTIAL HYPERTENSION: ICD-10-CM

## 2021-01-07 DIAGNOSIS — E78.5 HYPERLIPIDEMIA, UNSPECIFIED HYPERLIPIDEMIA TYPE: ICD-10-CM

## 2021-01-07 DIAGNOSIS — Z00.00 ENCOUNTER FOR MEDICARE ANNUAL WELLNESS EXAM: Primary | ICD-10-CM

## 2021-01-07 DIAGNOSIS — M79.10 MYALGIA DUE TO STATIN: ICD-10-CM

## 2021-01-07 DIAGNOSIS — E11.9 TYPE 2 DIABETES MELLITUS WITHOUT COMPLICATION, WITHOUT LONG-TERM CURRENT USE OF INSULIN (HCC): ICD-10-CM

## 2021-01-07 PROCEDURE — G8427 DOCREV CUR MEDS BY ELIG CLIN: HCPCS | Performed by: FAMILY MEDICINE

## 2021-01-07 PROCEDURE — G0439 PPPS, SUBSEQ VISIT: HCPCS | Performed by: FAMILY MEDICINE

## 2021-01-07 PROCEDURE — 99497 ADVNCD CARE PLAN 30 MIN: CPT | Performed by: FAMILY MEDICINE

## 2021-01-07 PROCEDURE — 1101F PT FALLS ASSESS-DOCD LE1/YR: CPT | Performed by: FAMILY MEDICINE

## 2021-01-07 PROCEDURE — G8399 PT W/DXA RESULTS DOCUMENT: HCPCS | Performed by: FAMILY MEDICINE

## 2021-01-07 PROCEDURE — 99442 PR PHYS/QHP TELEPHONE EVALUATION 11-20 MIN: CPT | Performed by: FAMILY MEDICINE

## 2021-01-07 PROCEDURE — G8510 SCR DEP NEG, NO PLAN REQD: HCPCS | Performed by: FAMILY MEDICINE

## 2021-01-07 PROCEDURE — G8756 NO BP MEASURE DOC: HCPCS | Performed by: FAMILY MEDICINE

## 2021-01-07 RX ORDER — FLUTICASONE PROPIONATE 50 MCG
SPRAY, SUSPENSION (ML) NASAL
Qty: 1 BOTTLE | Refills: 1 | Status: SHIPPED | OUTPATIENT
Start: 2021-01-07

## 2021-01-07 NOTE — PROGRESS NOTES
Bill Najera is a 81 y.o. female evaluated via telephone on 1/7/2021     Consent:  She and/or health care decision maker is aware that that she may receive a bill for this telephone service, depending on her insurance coverage, and has provided verbal consent to proceed: Yes      SUBJECTIVE     No chief complaint on file. HPI:     Her BP is being treated with amlodipine, losartan and hydrochlorothiazide. BP is running good at home. Her glucose is running good with life style and Glucophage. No hypoglycemia symptoms. Glucose running good 108-125. Glucose today was 110. Her GERD symptoms are better controlled. No chest pains even after she decreased  Prilosec to 20 bid. She has stopped Lipitor because it is causing muscle ache and stiffness. She feels better now. Her recent LDL is good. She is following diet for lipids and DM. She is on CPAP for BRIAN. Her tongue irritation is better; but still has dryness when using CPAP. No problem eating or pain/ soreness. She has chronic postnasal drip and now has had mild discomfort around both eyes in AM off and on for last few weeks. She has made an appointment with her ophthalmologist.  She denies any malaise, chills or fever. She denies any other complaints  No other Systemic, Cardiovascular or Respiratory symptoms. Past Medical History:   Diagnosis Date    Anemia     Chronic allergic rhinitis     Diabetes ([address]     Glaucoma     Hypercholesterolemia     Hypertension     Iron deficiency     Multiple thyroid nodules     neg (FNA)    BRIAN (obstructive sleep apnea)     Sleep apnea     Thyroid disease     lumps on thyroid    Trigger finger        Current Outpatient Medications   Medication Sig    Omeprazole delayed release (PRILOSEC D/R) 20 mg tablet Take 1 Tab by mouth two (2) times a day.  metFORMIN (Glucophage) 500 mg tablet Take 1 Tab by mouth two (2) times daily (with meals).  Indications: type 2 diabetes mellitus    losartan (Cozaar) 100 mg tablet Take 1 Tab by mouth daily.  hydroCHLOROthiazide (MICROZIDE) 12.5 mg capsule Take 1 Cap by mouth every other day.  glucose blood VI test strips (FreeStyle Lite Strips) strip Use as directed to check blood sugar once a day    amLODIPine (NORVASC) 2.5 mg tablet Take 1 Tab by mouth daily.  psyllium husk (METAMUCIL PO) Take 1 Cap by mouth daily.  clobetasol (TEMOVATE) 0.05 % topical cream Apply 1 g to affected area two (2) times a day.  latanoprost (XALATAN) 0.005 % ophthalmic solution Xalatan 0.005 % eye drops    1 drop every day by ophthalmic route.  meclizine (ANTIVERT) 25 mg tablet Take 1 Tab by mouth three (3) times daily as needed.  Blood-Glucose Meter (FREESTYLE LITE METER) monitoring kit Use as directed to check blood sugar once a day    Lancets (FREESTYLE LANCETS) misc Use as directed to check blood sugar once a day. No current facility-administered medications for this visit. Allergies   Allergen Reactions    Latex, Natural Rubber Other (comments)    Adhesive Contact Dermatitis     LEAVES DA ON SKIN    USE PAPER TAPE       ROS:   Constitutional: No fever, chills, night sweats, malaise. Ear/Nose/Throat: Has postnasal drip as above. No ear/sinus/ throat pain, lesions, unusual discharge, new speaking or hearing problems, nose bleed. Eyes: Discomfort around the eyes in the morning off and on. No eye discharge, redness, new visual changes. Cardiovascular: No angina, palpitations, PND, orthopnea, lightheadedness, edema, claudication. Respiratory: No dyspnea, wheeze, pleurisy, hemoptysis, unusual cough or sputum. Gastrointestinal: No nausea/ vomiting, bowel habit change, pain, ERA symptoms, melena, hematochezia, anorexia. Psychiatric: No agitation, confusion/disorientation, suicidal or homicidal ideation. Musculoskeletal: No focal weakness. No other complaints.     OBJECTIVE:    Today's vitals reported by Pt:  /80, OK 88; T 97.1F; Weight-190#; Ht-5'8\"    Constitutional: in no acute distress. Pulmonary: no dyspnea. Psychiatric[de-identified] Pleasant and cooperative. Oriented to time, place and person. Neurological[de-identified] Speech normal.      Lab Results   Component Value Date/Time    WBC 4.7 07/13/2020 08:17 AM    HGB 13.4 07/13/2020 08:17 AM    HCT 40.0 07/13/2020 08:17 AM    PLATELET 531 67/66/4156 08:17 AM    MCV 80.0 07/13/2020 08:17 AM     Lab Results   Component Value Date/Time    Sodium 138 07/13/2020 08:17 AM    Potassium 4.8 07/13/2020 08:17 AM    Chloride 104 07/13/2020 08:17 AM    CO2 30 07/13/2020 08:17 AM    Anion gap 4 07/13/2020 08:17 AM    Glucose 109 (H) 07/13/2020 08:17 AM    BUN 20 (H) 07/13/2020 08:17 AM    Creatinine 0.99 07/13/2020 08:17 AM    BUN/Creatinine ratio 20 07/13/2020 08:17 AM    GFR est AA >60 07/13/2020 08:17 AM    GFR est non-AA 54 (L) 07/13/2020 08:17 AM    Calcium 9.9 07/13/2020 08:17 AM    Bilirubin, total 0.5 07/13/2020 08:17 AM    Alk. phosphatase 86 07/13/2020 08:17 AM    Protein, total 7.7 07/13/2020 08:17 AM    Albumin 3.7 07/13/2020 08:17 AM    Globulin 4.0 07/13/2020 08:17 AM    A-G Ratio 0.9 07/13/2020 08:17 AM    ALT (SGPT) 22 07/13/2020 08:17 AM     Lab Results   Component Value Date/Time    Cholesterol, total 160 07/13/2020 08:17 AM    HDL Cholesterol 76 (H) 07/13/2020 08:17 AM    LDL, calculated 75.6 07/13/2020 08:17 AM    VLDL, calculated 8.4 07/13/2020 08:17 AM    Triglyceride 42 07/13/2020 08:17 AM    CHOL/HDL Ratio 2.1 07/13/2020 08:17 AM     Lab Results   Component Value Date/Time    Hemoglobin A1c 6.6 (H) 07/13/2020 08:17 AM    Hemoglobin A1c (POC) 6.9 02/21/2018 12:10 PM      Lab Results   Component Value Date/Time    TSH 1.01 07/13/2020 08:17 AM    T4, Free 0.9 06/10/2019 08:14 AM       ASSESSMENT     1. Encounter for Medicare annual wellness exam    2. Type 2 diabetes mellitus without complication, without long-term current use of insulin ([facility] [address]    3. Essential hypertension    4. Hyperlipidemia, unspecified hyperlipidemia type    5. Myalgia due to statin    6. Allergic rhinitis, unspecified seasonality, unspecified trigger    7. ACP (advance care planning)          PLAN     Orders Placed This Encounter    HEMOGLOBIN A1C WITH EAG    METABOLIC PANEL, BASIC    ALT    AST    fluticasone propionate (FLONASE) 50 mcg/actuation nasal spray     Pharmacologic Management: Medications reviewed with the patient. Flonase 2 sprays each nostril nightly. Saline nasal rinse as needed. Regular BS and BP check at home and notify MD of results prn. Follow-up with ophthalmologist.  Consider referral to ENT. The patient is advised to continue with low salt ADA diet and exercise as tolerated. Discussed risk/ benefit and side effects of treatment. Discussed differential diagnosis, follow-up and work-up. Patient voiced understanding. Follow-up and Dispositions    · Return in about 3 months (around 4/7/2021). Bill Najera is a 81 y.o. female evaluated via telephone on 1/7/2021    I affirm that this is a Patient Initiated Episode with an Established Patient who has not had a related appointment within my department in the past 7 days or scheduled within the next 24 hours.     Time Spent: 15 minutes        Natasha Eli MD

## 2021-01-07 NOTE — ACP (ADVANCE CARE PLANNING)
Advance Care Planning (ACP) Provider Note - Comprehensive     Date of ACP Conversation: 01/07/21  Persons included in Conversation:  patient  Length of ACP Conversation in minutes:  16 minutes    Authorized Decision Maker: self. The following General ACP was discussed with:patient     - Importance of advance care planning, including choosing a healthcare agent to  communicate patient's healthcare decisions if patient lost the ability to make decisions, such as after a sudden illness or accident. - Understanding of the healthcare agent role was assessed and information provided. - Exploration of values, goals, and preferences if recovery is not expected, even with continued medical treatment in the event of: Imminent death and/or Severe permanent brain injury. \"In these circumstances, what matters most to you? \":  Care focused more on comfort or quality of life. \"What, if any, treatments would you want to avoid? \": Life Prolonging Measures    - Opportunity offered to explore how cultural, Anglican, spiritual, or personal beliefs would affect decisions for future care. Review of Existing Advance Directive:    \"What information were you given about medical decisions to consider before completing your advance directive? \": Do not remember; done by an  several years ago. \"What is your understanding of your agent's willingness to honor your wishes, even if he/she may not agree with them? \": will honor  \"Does this advance directive still reflect your preferences? \": Yes. The patient is advised to get the new form for updating her advance directive. For Serious or Chronic Illness: The following items were discussed with the patient who verbalized understanding:    - Understanding of medical condition.      - Understanding of CPR, goals and expected outcomes, benefits and burdens discussed.     - Information on CPR success rates provided (e.g. for CPR in hospital, survival to d/c at two weeks is 22%, for chronically ill or elderly/frail survival is less than 3%); the patient was asked to communicate understanding of information in his/her own words. - Explored fears and concerns regarding CPR or possible outcomes    Interventions Provided:  Recommended completion of Advance Directive form after review of ACP materials and conversation with prospective healthcare agent. Recommended communicating the plan and making copies for the healthcare agent, personal physician, and others as appropriate. Recommended review of completed ACP document annually or upon change in health status. Reviewed existing Advance Directive. Summary:  The patient was advised that:  She may like to appoint a person as her medical decision maker in the event that she can no longer do so. She may appoint a secondary person also. She is encouraged to make decision at this time re: if she wants life prolonging measures in the event-     A) her death is imminent and medical treatment will not help her recover. B) her condition makes her unaware of herself or her surroundings and she cannot interact with others. Patient would like to appoint her daughter Jennifer Lieberman as her medical decision maker in the event that she can no longer do so. Phone number is [phone]. Her secondary person is her daughter Jeremy Siddiqui. Phone number is [phone]. If her death is imminent and medical treatment will not help her recover, the patient does not want life prolonging measures. If her condition makes her unaware of self or surroundings and she cannot interact with others, the patient does not want life prolonging measures. She is encouraged bring her updated advance directive to the next visit. She is instruced get today's AVS/ forms from [place] or from the clinic.

## 2021-01-07 NOTE — PROGRESS NOTES
This is the Subsequent Medicare Annual Wellness Exam, performed 12 months or more after the Initial AWV or the last Subsequent AWV    I have reviewed the patient's medical history in detail and updated the computerized patient record. History     Past Medical History:   Diagnosis Date    Anemia     Chronic allergic rhinitis     Diabetes ([address]     Glaucoma     Hypercholesterolemia     Hypertension     Iron deficiency     Multiple thyroid nodules     neg (FNA)    BRIAN (obstructive sleep apnea)     Sleep apnea     Thyroid disease     lumps on thyroid    Trigger finger       Past Surgical History:   Procedure Laterality Date    HX BUNIONECTOMY  1995    HX CATARACT REMOVAL  2012    correction    HX HERNIA REPAIR  2002    HX HYSTERECTOMY       Current Outpatient Medications   Medication Sig Dispense Refill    Omeprazole delayed release (PRILOSEC D/R) 20 mg tablet Take 1 Tab by mouth two (2) times a day. 180 Tab 1    metFORMIN (Glucophage) 500 mg tablet Take 1 Tab by mouth two (2) times daily (with meals). Indications: type 2 diabetes mellitus 180 Tab 1    losartan (Cozaar) 100 mg tablet Take 1 Tab by mouth daily. 90 Tab 3    hydroCHLOROthiazide (MICROZIDE) 12.5 mg capsule Take 1 Cap by mouth every other day. 45 Cap 3    glucose blood VI test strips (FreeStyle Lite Strips) strip Use as directed to check blood sugar once a day 100 Strip 3    amLODIPine (NORVASC) 2.5 mg tablet Take 1 Tab by mouth daily. 90 Tab 1    psyllium husk (METAMUCIL PO) Take 1 Cap by mouth daily.  clobetasol (TEMOVATE) 0.05 % topical cream Apply 1 g to affected area two (2) times a day.  latanoprost (XALATAN) 0.005 % ophthalmic solution Xalatan 0.005 % eye drops    1 drop every day by ophthalmic route.  meclizine (ANTIVERT) 25 mg tablet Take 1 Tab by mouth three (3) times daily as needed.  30 Tab 0    Blood-Glucose Meter (FREESTYLE LITE METER) monitoring kit Use as directed to check blood sugar once a day 1 Kit 0    Lancets (FREESTYLE LANCETS) [facility] Use as directed to check blood sugar once a day. 100 Each 3     Allergies   Allergen Reactions    Latex, Natural Rubber Other (comments)    Adhesive Contact Dermatitis     LEAVES DA ON SKIN    USE PAPER TAPE     No family history on file. Social History     Tobacco Use    Smoking status: Former Smoker     Packs/day: 0.25     Quit date: 1970     Years since quittin.0    Smokeless tobacco: Never Used    Tobacco comment: Quit smoking    Substance Use Topics    Alcohol use: No     Patient Active Problem List   Diagnosis Code    Hypertension I10    Diabetes mellitus ([facility] E11.9    Encounter to establish care Z76.89    Tennis elbow M77.10    Trigger finger M65.30    Tendonitis of elbow, right M77.8    Hyperlipemia E78.5    Hot flashes, menopausal N95.1    Vertigo R42    Chronic allergic rhinitis J30.9    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 Functional Ability and Level of Safety:   Hearing Loss  Hearing is good. Activities of Daily Living  The home contains: Ventive  Patient does total self care    Fall Risk  Fall Risk Assessment, last 12 mths 2021   Able to walk? Yes   Fall in past 12 months? -   Number of falls in past 12 months -   Fall with injury? -   She is cautious to avoid falls and she does not do very help with ambulation. Does not need a cane or walker.     Abuse Screen  Patient is not abused    Cognitive Screening   Evaluation of Cognitive Function:  Has your family/caregiver stated any concerns about your memory: no  Cognition: Normal    Patient Care Team   Patient Care Team:  Paul Bailon MD as PCP - General (Family Medicine)  Paul Bailon MD as PCP - [address] [place] [address] [facility]  Forrest Moran MD (Cardiology)  Brianne Morales MD (Ophthalmology)  Vikki Bañuelos MD (Dermatology)    Assessment/Plan   Education and counseling provided:  Are appropriate based on today's review and evaluation    Diagnoses and all orders for this visit:    1. Encounter for Medicare annual wellness exam    2. Type 2 diabetes mellitus without complication, without long-term current use of insulin (HCC)  -     HEMOGLOBIN A1C WITH EAG; Future  -     METABOLIC PANEL, BASIC; Future    3. Essential hypertension  -     METABOLIC PANEL, BASIC; Future    4. Hyperlipidemia, unspecified hyperlipidemia type  -     ALT; Future  -     AST; Future    5. Myalgia due to statin    6. Allergic rhinitis, unspecified seasonality, unspecified trigger  -     fluticasone propionate (FLONASE) 50 mcg/actuation nasal spray; 2 sprays in each nostril nightly. 7. ACP (advance care planning)        Health Maintenance Due   Topic Date Due    Foot Exam Q1  12/09/2020    Medicare Yearly Exam  12/09/2020    A1C test (Diabetic or Prediabetic)  01/13/2021     Diabetic foot exam to be done with next in person visit. Hemoglobin A1c has been ordered. She is instruced get today's AVS/ forms from [facility] or from the clinic.

## 2021-01-08 NOTE — PATIENT INSTRUCTIONS
Medicare Wellness Visit, Female The best way to live healthy is to have a lifestyle where you eat a well-balanced diet, exercise regularly, limit alcohol use, and quit all forms of tobacco/nicotine, if applicable. Regular preventive services are another way to keep healthy. Preventive services (vaccines, screening tests, monitoring & exams) can help personalize your care plan, which helps you manage your own care. Screening tests can find health problems at the earliest stages, when they are easiest to treat. [facility] follows the current, evidence-based guidelines published by the [facility] ([facility]) when recommending preventive services for our patients. Because we follow these guidelines, sometimes recommendations change over time as research supports it. (For example, mammograms used to be recommended annually. Even though Medicare will still pay for an annual mammogram, the newer guidelines recommend a mammogram every two years for women of average risk.) Of course, you and your doctor may decide to screen more often for some diseases, based on your risk and your health status. Preventive services for you include: - Medicare offers their members a free annual wellness visit, which is time for you and your primary care provider to discuss and plan for your preventive service needs. Take advantage of this benefit every year! 
-All adults over the age of 72 should receive the recommended pneumonia vaccines. Current USPSTF guidelines recommend a series of two vaccines for the best pneumonia protection.  
-All adults should have a flu vaccine yearly and a tetanus vaccine every 10 years. All adults age 61 and older should receive a shingles vaccine once in their lifetime. -A bone mass density test is recommended when a woman turns 65 to screen for osteoporosis. This test is only recommended one time, as a screening. Some providers will use this same test as a disease monitoring tool if you already have osteoporosis. -All adults age 38-68 who are overweight should have a diabetes screening test once every three years.  
-Other screening tests and preventive services for persons with diabetes include: an eye exam to screen for diabetic retinopathy, a kidney function test, a foot exam, and stricter control over your cholesterol.  
-Cardiovascular screening for adults with routine risk involves an electrocardiogram (ECG) at intervals determined by your doctor.  
-Colorectal cancer screenings should be done for adults age 54-65 with no increased risk factors for colorectal cancer. There are a number of acceptable methods of screening for this type of cancer. Each test has its own benefits and drawbacks. Discuss with your doctor what is most appropriate for you during your annual wellness visit. The different tests include: colonoscopy (considered the best screening method), a fecal occult blood test, a fecal DNA test, and sigmoidoscopy. -Breast cancer screenings are recommended every other year for women of normal risk, age 54-69. 
-Cervical cancer screenings for women over age 72 are only recommended with certain risk factors.  
-All adults born between [facility] should be screened once for Hepatitis C. Here is a list of your current Health Maintenance items (your personalized list of preventive services) with a due date: 
Health Maintenance Due Topic Date Due  
 Diabetic Foot Care  12/09/2020  Hemoglobin A1C    01/13/2021 Preventing Falls: Care Instructions Your Care Instructions Getting around your home safely can be a challenge if you have injuries or health problems that make it easy for you to fall. Loose rugs and furniture in walkways are among the dangers for many older people who have problems walking or who have poor eyesight. People who have conditions such as arthritis, osteoporosis, or dementia also have to be careful not to fall. You can make your home safer with a few simple measures. Follow-up care is a key part of your treatment and safety. Be sure to make and go to all appointments, and call your doctor if you are having problems. It's also a good idea to know your test results and keep a list of the medicines you take. How can you care for yourself at home? Taking care of yourself · You may get dizzy if you do not drink enough water. To prevent dehydration, drink plenty of fluids, enough so that your urine is light yellow or clear like water. Choose water and other caffeine-free clear liquids. If you have kidney, heart, or liver disease and have to limit fluids, talk with your doctor before you increase the amount of fluids you drink. · Exercise regularly to improve your strength, muscle tone, and balance. Walk if you can. Swimming may be a good choice if you cannot walk easily. · Have your vision and hearing checked each year or any time you notice a change. If you have trouble seeing and hearing, you might not be able to avoid objects and could lose your balance. · Know the side effects of the medicines you take. Ask your doctor or pharmacist whether the medicines you take can affect your balance. Sleeping pills or sedatives can affect your balance. · Limit the amount of alcohol you drink. Alcohol can impair your balance and other senses. · Ask your doctor whether calluses or corns on your feet need to be removed. If you wear loose-fitting shoes because of calluses or corns, you can lose your balance and fall. · Talk to your doctor if you have numbness in your feet. Preventing falls at home · Remove raised doorway thresholds, throw rugs, and clutter. Repair loose carpet or raised areas in the floor. · Move furniture and electrical cords to keep them out of walking paths. · Use nonskid floor wax, and wipe up spills right away, especially on ceramic tile floors. · If you use a walker or cane, put rubber tips on it. If you use crutches, clean the bottoms of them regularly with an abrasive pad, such as steel wool. · Keep your house well lit, especially Phylliss Chyle, and outside walkways. Use night-lights in areas such as hallways and bathrooms. Add extra light switches or use remote switches (such as switches that go on or off when you clap your hands) to make it easier to turn lights on if you have to get up during the night. · Install sturdy handrails on stairways. · Move items in your cabinets so that the things you use a lot are on the lower shelves (about waist level). · Keep a cordless phone and a flashlight with new batteries by your bed. If possible, put a phone in each of the main rooms of your house, or carry a cell phone in case you fall and cannot reach a phone. Or, you can wear a device around your neck or wrist. You push a button that sends a signal for help. · Wear low-heeled shoes that fit well and give your feet good support. Use footwear with nonskid soles. Check the heels and soles of your shoes for wear. Repair or replace worn heels or soles. · Do not wear socks without shoes on wood floors. · Walk on the grass when the sidewalks are slippery. If you live in an area that gets snow and ice in the winter, sprinkle salt on slippery steps and sidewalks. Preventing falls in the bath · Install grab bars and nonskid mats inside and outside your shower or tub and near the toilet and sinks. · Use shower chairs and bath benches. · Use a hand-held shower head that will allow you to sit while showering. · Get into a tub or shower by putting the weaker leg in first. Get out of a tub or shower with your strong side first. 
· Repair loose toilet seats and consider installing a raised toilet seat to make getting on and off the toilet easier. · Keep your bathroom door unlocked while you are in the shower. Where can you learn more? Go to http://www.hancock.com/. Enter [id number] in the search box to learn more about \"Preventing Falls: Care Instructions. \" Current as of: May 12, 2017 Content Version: 11.4 © 4505-2507 [employer]. Care instructions adapted under license by [employer] (which disclaims liability or warranty for this information). If you have questions about a medical condition or this instruction, always ask your healthcare professional. [place] [address] any warranty or liability for your use of this information. Preventing Outdoor Falls: Care Instructions Your Care Instructions Worries about falls don't need to keep you indoors. Outdoor activities like walking have big benefits for your health. You will need to watch your step and learn a few safety measures. If you are worried about having a fall outdoors, ask your doctor about exercises, classes, or physical therapy that may help. You can learn ways to gain strength, flexibility, and balance. Ask if it might help to use a cane or walker. You can make your time outdoors safer with a few simple measures. Follow-up care is a key part of your treatment and safety. Be sure to make and go to all appointments, and call your doctor if you are having problems. It's also a good idea to know your test results and keep a list of the medicines you take. How can you prevent falls outdoors? · Wear shoes with firm soles and low heels. If you have to walk on an icy surface, use grippers that can be worn over your shoes in bad weather. · Be extra careful if weather is bad. Walk on the grass when the sidewalks are slick. If you live in a place that gets snow and ice in the winter, sprinkle salt on slippery stairs and sidewalks. · Be careful getting on or off buses and trains or getting in and out of cars. If handrails are available, use them. · Be careful when you cross the street. Look for crosswalks or places where curb cuts or ramps are present. · Try not to hurry, especially if you are carrying something. · Be cautious in parking lots or garages. There may be curbs or changes in pavement, or the height of the pavement may vary. · Make sure to wear the correct eyeglasses, if you need them. Reading glasses or bifocals can make it harder to see hazards that might be in your way. · If you are walking outdoors for exercise, try to: 
¨ Walk in well-lighted, well-maintained areas. These include high school or college tracks, shopping malls, and public spaces. ¨ Walk with a partner. ¨ Watch out for cracked sidewalks, curbs, changes in the height of the pavement, exposed tree roots, and debris such as fallen leaves or branches. Where can you learn more? Go to http://yusra-maria l.info/. Enter B993 in the search box to learn more about \"Preventing Outdoor Falls: Care Instructions. \" Current as of: May 12, 2017 Content Version: 11.4 © 3848-3048 [employer]. Care instructions adapted under license by [employer] (which disclaims liability or warranty for this information). If you have questions about a medical condition or this instruction, always ask your healthcare professional. [place] [address] any warranty or liability for your use of this information. How to Get Up Safely After a Fall: Care Instructions Your Care Instructions If you have injuries, health problems, or other reasons that may make it easy for you to fall at home, it is a good idea to learn how to get up safely after a fall. Learning how to get up correctly can help you avoid making an injury worse. Also, knowing what to do if you cannot get up can help you stay safe until help arrives. Follow-up care is a key part of your treatment and safety. Be sure to make and go to all appointments, and call your doctor if you are having problems. It's also a good idea to know your test results and keep a list of the medicines you take. How can you care for yourself after a fall? If you think you can get up First lie still for a few minutes and think about how you feel. If your body feels okay and you think you can get up safely, follow the rest of the steps below: 1. Look for a chair or other piece of furniture that is close to you. 2. Roll onto your side and rest. Roll by turning your head in the direction you want to roll, move your shoulder and arm, then hip and leg in the same direction. 3. Lie still for a moment to let your blood pressure adjust. 
4. Slowly push your upper body up, lift your head, and take a moment to rest. 
5. Slowly get up on your hands and knees, and crawl to the chair or other stable piece of furniture. 6. Put your hands on the chair. 7. Move one foot forward, and place it flat on the floor. Your other leg should be bent with the knee on the floor. 8. Rise slowly, turn your body, and sit in the chair. Stay seated for a bit and think about how you feel. Call for help. Even if you feel okay, let someone know what happened to you. You might not know that you have a serious injury. If you cannot get up 1. If you think you are injured after a fall or you cannot get up, try not to panic. 2. Call out for help. 3. If you have a phone within reach or you have an emergency call device, use it to call for help. 4. If you do not have a phone within reach, try to slide yourself toward it. If you cannot get to the phone, try to slide toward a door or window or a place where you think you can be heard. 5. Hayes or use an object to make noise so someone might hear you. 6. If you can reach something that you can use for a pillow, place it under your head. Try to stay warm by covering yourself with a blanket or clothing while you wait for help. When should you call for help? Call 911 anytime you think you may need emergency care. For example, call if: 
 · You passed out (lost consciousness). · You cannot get up after a fall. · You have severe pain. Call your doctor now or seek immediate medical care if: 
 · You have new or worse pain. · You are dizzy or lightheaded. · You hit your head. Watch closely for changes in your health, and be sure to contact your doctor if: 
 · You do not get better as expected. Where can you learn more? Go to http://www.gray.com/. Enter R036 in the search box to learn more about \"How to Get Up Safely After a Fall: Care Instructions. \" Current as of: May 12, 2017 Content Version: 11.4 © 8748-1841 [employer]. Care instructions adapted under license by [employer] (which disclaims liability or warranty for this information). If you have questions about a medical condition or this instruction, always ask your healthcare professional. [place] [address] any warranty or liability for your use of this information. Advance Care Planning: Care Instructions Your Care Instructions It can be hard to live with an illness that cannot be cured. But if your health is getting worse, you may want to make decisions about end-of-life care. Planning for the end of your life does not mean that you are giving up. It is a way to make sure that your wishes are met. Clearly stating your wishes can make it easier for your loved ones. Making plans while you are still able may also ease your mind and make your final days less stressful and more meaningful. Follow-up care is a key part of your treatment and safety. Be sure to make and go to all appointments, and call your doctor if you are having problems. It's also a good idea to know your test results and keep a list of the medicines you take. What can you do to plan for the end of life? · You can bring these issues up with your doctor. You do not need to wait until your doctor starts the conversation. You might start with \"I would not be willing to live with . She Conroe She Conroe She Conroe \" When you complete this sentence it helps your doctor understand your wishes. · Talk openly and honestly with your doctor. This is the best way to understand the decisions you will need to make as your health changes. Know that you can always change your mind. · Ask your doctor about commonly used life-support measures. These include tube feedings, breathing machines, and fluids given through a vein (IV). Understanding these treatments will help you decide whether you want them. · You may choose to have these life-supporting treatments for a limited time. This allows a trial period to see whether they will help you. You may also decide that you want your doctor to take only certain measures to keep you alive. It is important to spell out these conditions so that your doctor and family understand them. · Talk to your doctor about how long you are likely to live. He or she may be able to give you an idea of what usually happens with your specific illness. · Think about preparing papers that state your wishes. This way there will not be any confusion about what you want. You can change your instructions at any time. Which papers should you prepare? Advance directives are legal papers that tell doctors how you want to be cared for at the end of your life. You do not need a  to write these papers. Ask your doctor or your state [facility] department for information on how to write your advance directives. They may have the forms for each of these types of papers. Make sure your doctor has a copy of these on file, and give a copy to a family member or close friend. · Consider a do-not-resuscitate order (DNR). This order asks that no extra treatments be done if your heart stops or you stop breathing. Extra treatments may include cardiopulmonary resuscitation (CPR), electrical shock to restart your heart, or a machine to breathe for you. If you decide to have a DNR order, ask your doctor to explain and write it. Place the order in your home where everyone can easily see it. · Consider a living will. A living will explains your wishes about life support and other treatments at the end of your life if you become unable to speak for yourself. Living reyes tell doctors to use or not use treatments that would keep you alive. You must have one or two witnesses or a notary present when you sign this form. · Consider a durable power of  for health care. This allows you to name a person to make decisions about your care if you are not able to. Most people ask a close friend or family member. Talk to this person about the kinds of treatments you want and those that you do not want. Make sure this person understands your wishes. These legal papers are simple to change. Tell your doctor what you want to change, and ask him or her to make a note in your medical file. Give your family updated copies of the papers. Where can you learn more? Go to http://www.gray.com/. Enter P184 in the search box to learn more about \"Advance Care Planning: Care Instructions. \" Current as of: September 24, 2016 Content Version: 11.4 © 6207-0450 [employer]. Care instructions adapted under license by [employer] (which disclaims liability or warranty for this information). If you have questions about a medical condition or this instruction, always ask your healthcare professional. [address] any warranty or liability for your use of this information. Learning About Durable Power of  for Health Care What is a durable power of  for health care? A durable power of  for health care is one type of the legal forms called advance directives. It lets you decide who you want to make treatment decisions for you if you cannot speak or decide for yourself. The person you choose is called your health care agent. Another type of advance directive is a living will. It lets you write down what kinds of treatment or life support you want or do not want. What should you think about when choosing a health care agent? Choose your health care agent carefully. This person may or may not be a family member. Talk to the person before you make your final decision. Make sure he or she is comfortable with this responsibility. It's a good idea to choose someone who: · Is at least 25years old. · Knows you well and understands what makes life meaningful for you. · Understands your Mosque and moral values. · Will do what you want, not what he or she wants. · Will be able to make difficult choices at a stressful time. · Will be able to refuse or stop treatment, if that is what you would want, even if you could die. · Will be firm and confident with health professionals if needed. · Will ask questions to get necessary information. · Lives near you or agrees to travel to you if needed. Your family may help you make medical decisions while you can still be part of that process. But it is important to choose one person to be your health care agent in case you are not able to make decisions for yourself. If you don't fill out the legal form and name a health care agent, the decisions your family can make may be limited. Who will make decisions for you if you do not have a health care agent? If you don't have a health care agent or a living will, your family members may disagree about your medical care. And then some medical professionals who may not know you as well might have to make decisions for you. In some cases, a  makes the decisions. When you name a health care agent, it is very clear who has the power to make health decisions for you. How do you name a health care agent? You name your health care agent on a legal form. It is usually called a durable power of  for health care. Ask your hospital, state bar association, or office on aging where to find these forms. You must sign the form to make it legal. Some states require you to get the form notarized. This means that a person called a  watches you sign the form and then he or she signs the form. Some states also require that two or more witnesses sign the form. Be sure to tell your family members and doctors who your health care agent is. Keep your forms in a safe place. But make sure that your loved ones know where the forms are. This could be in your desk where you keep other important papers. Make sure your doctor has a copy of your forms. Where can you learn more? Go to http://www.gray.com/. Enter [id number] in the search box to learn more about \"Learning About Durable Power of  for [facility]. \" Current as of: September 24, 2016 Content Version: 11.4 © 6844-8339 Healthwise, [employer]. Care instructions adapted under license by [employer] (which disclaims liability or warranty for this information). If you have questions about a medical condition or this instruction, always ask your healthcare professional. [address] any warranty or liability for your use of this information. Deciding About Life-Prolonging Treatment Deciding About Life-Prolonging Treatment What is life-prolonging treatment? There are many kinds of treatment that can help you live longer. These may be needed for only a short time until your illness improves. Or you may use them over the long term to help keep you alive. Some treatments include the use of: · Medicines to slow the progress of certain diseases, such as heart disease, diabetes, cancer, AIDS, or Alzheimer's disease. · Antibiotics to treat serious infections, such as pneumonia. · Dialysis to clean your blood if your kidneys stop working. · A breathing machine to help you breathe if you can't breathe on your own. This machine pumps air into your lungs through a tube put into your throat. · A feeding tube or an intravenous (IV) line to give you food and fluids if you can't eat or drink. · Cardiopulmonary resuscitation (CPR) to try to restart your heart. The decision to receive treatments that may help you live longer is a personal one. You may want your doctor to do everything possible to keep you alive, even when your chance for recovery is small. Or you may choose to only have care to manage your pain and other symptoms. What are key points about this decision? · If there is a good chance that your illness can be cured or managed, your doctor may advise you to first try available treatments. If these don't work, then you might think about stopping treatment. · If you stop treatment, you will still receive care that focuses on pain relief and comfort. · A decision to stop treatment that keeps you alive does not have to be permanent. You can always change your mind if your health starts to improve. · Even though treatment focuses on helping you live longer, it may cause side effects that can greatly affect your quality of life. And it could affect how you spend time with your family and friends. · If you still have personal goals that you want to pursue, you may want treatment that keeps you alive long enough to reach them. Why might you choose life-prolonging treatment? · There is a good chance that your illness can be cured or managed. · You think you can manage the possible side effects of treatment. · You don't think treatment will get in the way of your quality of life. · You have personal goals that you still want to pursue and achieve. Why might you choose to stop life-prolonging treatment? · Your chance of surviving your illness is very low. · You have tried all possible treatments for your illness, but they have not helped. · You can no longer deal with the side effects of treatment. · You have already met the goals you set out to achieve in your life. Your decision Thinking about the facts and your feelings can help you make a decision that is right for you. Be sure you understand the benefits and risks of your options, and think about what else you need to do before you make the decision. Where can you learn more? Go to http://www.gray.com/. Enter L874 in the search box to learn more about \"Deciding About Life-Prolonging Treatment. \" Current as of: September 24, 2016 Content Version: 11.4 © 7906-7295 Healthwise, Incorporated. Care instructions adapted under license by [email or web address] (which disclaims liability or warranty for this information). If you have questions about a medical condition or this instruction, always ask your healthcare professional. [address] any warranty or liability for your use of this information. Vickey Bennett 6143 What is a living will? A living will is a legal form you use to write down the kind of care you want at the end of your life. It is used by the health professionals who will treat you if you aren't able to decide for yourself. If you put your wishes in writing, your loved ones and others will know what kind of care you want. They won't need to guess. This can ease your mind and be helpful to others. A living will is not the same as an estate or property will. An estate will explains what you want to happen with your money and property after you die. Is a living will a legal document? A living will is a legal document. Each state has its own laws about living reyes. If you move to another state, make sure that your living will is legal in the state where you now live. Or you might use a universal form that has been approved by many states. This kind of form can sometimes be completed and stored online. Your electronic copy will then be available wherever you have a connection to the Internet. In most cases, doctors will respect your wishes even if you have a form from a different state. · You don't need an  to complete a living will. But legal advice can be helpful if your state's laws are unclear, your health history is complicated, or your family can't agree on what should be in your living will. · You can change your living will at any time. Some people find that their wishes about end-of-life care change as their health changes. · In addition to making a living will, think about completing a medical power of  form. This form lets you name the person you want to make end-of-life treatment decisions for you (your \"health care agent\") if you're not able to. Many hospitals and nursing homes will give you the forms you need to complete a living will and a medical power of . · Your living will is used only if you can't make or communicate decisions for yourself anymore. If you become able to make decisions again, you can accept or refuse any treatment, no matter what you wrote in your living will. · Your state may offer an online registry. This is a place where you can store your living will online so the doctors and nurses who need to treat you can find it right away. What should you think about when creating a living will? Talk about your end-of-life wishes with your family members and your doctor. Let them know what you want. That way the people making decisions for you won't be surprised by your choices. Think about these questions as you make your living will: · Do you know enough about life support methods that might be used? If not, talk to your doctor so you know what might be done if you can't breathe on your own, your heart stops, or you're unable to swallow. · What things would you still want to be able to do after you receive life-support methods? Would you want to be able to walk? To speak? To eat on your own? To live without the help of machines? · If you have a choice, where do you want to be cared for? In your home? At a hospital or nursing home? · Do you want certain Latter-day practices performed if you become very ill? · If you have a choice at the end of your life, where would you prefer to die? At home? In a hospital or nursing home? Somewhere else? · Would you prefer to be buried or cremated? · Do you want your organs to be donated after you die? What should you do with your living will? · Make sure that your family members and your health care agent have copies of your living will. · Give your doctor a copy of your living will to keep in your medical record. If you have more than one doctor, make sure that each one has a copy. · You may want to put a copy of your living will where it can be easily found. Where can you learn more? Go to http://yusra-maria l.info/. Enter K991 in the search box to learn more about \"Learning About Living [place]. \" Current as of: September 24, 2016 Content Version: 11.4 © 7917-6284 [employer]. Care instructions adapted under license by [employer] (which disclaims liability or warranty for this information). If you have questions about a medical condition or this instruction, always ask your healthcare professional. [address] any warranty or liability for your use of this information. Advance Directives: Care Instructions Your Care Instructions An advance directive is a legal way to state your wishes at the end of your life. It tells your family and your doctor what to do if you can no longer say what you want. There are two main types of advance directives. You can change them any time that your wishes change. · A living will tells your family and your doctor your wishes about life support and other treatment. · A durable power of  for health care lets you name a person to make treatment decisions for you when you can't speak for yourself. This person is called a health care agent. If you do not have an advance directive, decisions about your medical care may be made by a doctor or a  who doesn't know you. It may help to think of an advance directive as a gift to the people who care for you. If you have one, they won't have to make tough decisions by themselves. Follow-up care is a key part of your treatment and safety. Be sure to make and go to all appointments, and call your doctor if you are having problems. It's also a good idea to know your test results and keep a list of the medicines you take. How can you care for yourself at home? · Discuss your wishes with your loved ones and your doctor. This way, there are no surprises. · Many states have a unique form. Or you might use a universal form that has been approved by many states. This kind of form can sometimes be completed and stored online. Your electronic copy will then be available wherever you have a connection to the Internet. In most cases, doctors will respect your wishes even if you have a form from a different state. · You don't need a  to do an advance directive. But you may want to get legal advice. · Think about these questions when you prepare an advance directive: ¨ Who do you want to make decisions about your medical care if you are not able to? Many people choose a family member or close friend. ¨ Do you know enough about life support methods that might be used? If not, talk to your doctor so you understand. ¨ What are you most afraid of that might happen? You might be afraid of having pain, losing your independence, or being kept alive by machines. ¨ Where would you prefer to die? Choices include your home, a hospital, or a nursing home. ¨ Would you like to have information about hospice care to support you and your family? ¨ Do you want to donate organs when you die? ¨ Do you want certain Orthodox practices performed before you die? If so, put your wishes in the advance directive. · Read your advance directive every year, and make changes as needed. When should you call for help? Be sure to contact your doctor if you have any questions. Where can you learn more? Go to http://www.gray.com/. Enter R264 in the search box to learn more about \"Advance Directives: Care Instructions. \" Current as of: September 24, 2016 Content Version: 11.4 © 5529-0308 [employer]rek. Care instructions adapted under license by [employer] (which disclaims liability or warranty for this information). If you have questions about a medical condition or this instruction, always ask your healthcare professional. [address] any warranty or liability for your use of this information. [address] Advance Care Planning: LUCIANAB [address] The way we are cared for matters. Advance care planning means making The way we are cared for matters. Advance care planning means making decisions 
about health care you want to receive if you face a medical crisis, and it is a vital part of 
your care. Your personal values, preferences and discussions with loved ones all factor 
in to these choices. We at [address] [facility],[address] [place] [address] can help you get started. Think about what matters to you  Who do I trust and would be available quickly to make medical decisions for me if I cant 
make them myself?  What are the most important things my loved ones and doctors should know about my 
values, beliefs, and what gives me strength in difficult times?  If I had a sudden illness or accident and was not expected to recover, have I told 
anyone my wishes for care? Now is the time to have this discussion. Learn about treatments options & COVID-19 Advance care planning includes learning about the types of life-sustaining treatments 
that are available, and then deciding what types of treatments you would or would not 
want should you be diagnosed with a life-limiting illness. Advance care planning forms 
give you a choice to use or not use ventilators (breathing machines), tube feedings, and 
attempts at CPR (cardiopulmonary resuscitation). By considering when and if you would want these treatments, it may relieve some pressure on you and your loved ones if the 
decision presents itself. It is important to know:  Some people who get COVID-19, especially those who are young and healthy, will get better with 
routine care. People who get a severe case are mostly those who are older or have other medical 
problems. Over half of people who are hospitalized and placed on a ventilator (breathing machine) do 
not survive.  If someone develops symptoms that are severe enough to seek treatment in the hospital, and their care 
preferences are not known, they may receive treatment that they did not want or that wont reverse 
their condition.  If you receive health care during the COVID-19 pandemic, your care team may talk with you about 
CPR. Be aware that availability of CPR may change depending on resources and if the performance 
of CPR would put staff and others at risk of exposure. Talk with your doctor about any questions you 
have. Ask Yourself: What matters most to me? If I became very sick with COVID-19 would I prefer to 
stay where I live or go to the hospital? Next step: Talk with your loved ones about your wishes 
and what matters to you. Finally, tell your healthcare provider and write down your wishes in a 
document called an advance directive. When your healthcare team knows what matters to you, 
you will receive care that is better for you.

## 2021-01-15 ENCOUNTER — HOSPITAL ENCOUNTER (OUTPATIENT)
Dept: LAB | Age: 86
Discharge: HOME | End: 2021-01-15
Payer: MEDICARE

## 2021-01-15 DIAGNOSIS — I10 ESSENTIAL HYPERTENSION: ICD-10-CM

## 2021-01-15 DIAGNOSIS — E78.5 HYPERLIPIDEMIA, UNSPECIFIED HYPERLIPIDEMIA TYPE: ICD-10-CM

## 2021-01-15 DIAGNOSIS — E11.9 TYPE 2 DIABETES MELLITUS WITHOUT COMPLICATION, WITHOUT LONG-TERM CURRENT USE OF INSULIN (HCC): ICD-10-CM

## 2021-01-15 LAB
ALT SERPL-CCNC: 21 U/L (ref 13–56)
ANION GAP SERPL CALC-SCNC: 4 MMOL/L (ref 3–18)
AST SERPL-CCNC: 14 U/L (ref 10–38)
BUN SERPL-MCNC: 15 MG/DL (ref 7–18)
BUN/CREAT SERPL: 17 (ref 12–20)
CALCIUM SERPL-MCNC: 9.5 MG/DL (ref 8.5–10.1)
CHLORIDE SERPL-SCNC: 104 MMOL/L (ref 100–111)
CO2 SERPL-SCNC: 33 MMOL/L (ref 21–32)
CREAT SERPL-MCNC: 0.9 MG/DL (ref 0.6–1.3)
EST. AVERAGE GLUCOSE BLD GHB EST-MCNC: 146 MG/DL
GLUCOSE SERPL-MCNC: 137 MG/DL (ref 74–99)
HBA1C MFR BLD: 6.7 % (ref 4.2–5.6)
POTASSIUM SERPL-SCNC: 4.6 MMOL/L (ref 3.5–5.5)
SODIUM SERPL-SCNC: 141 MMOL/L (ref 136–145)

## 2021-01-15 PROCEDURE — 84460 ALANINE AMINO (ALT) (SGPT): CPT

## 2021-01-15 PROCEDURE — 80048 BASIC METABOLIC PNL TOTAL CA: CPT

## 2021-01-15 PROCEDURE — 83036 HEMOGLOBIN GLYCOSYLATED A1C: CPT

## 2021-01-15 PROCEDURE — 84450 TRANSFERASE (AST) (SGOT): CPT

## 2021-01-15 PROCEDURE — 36415 COLL VENOUS BLD VENIPUNCTURE: CPT

## 2021-01-18 NOTE — PROGRESS NOTES
Hemoglobin A1c for diabetes is stable at 6.7, in target.   Except for elevated glucose from diabetes, chemistry including liver enzymes and kidney function are good

## 2021-02-24 ENCOUNTER — OFFICE VISIT (OUTPATIENT)
Dept: FAMILY MEDICINE CLINIC | Age: 86
End: 2021-02-24
Payer: MEDICARE

## 2021-02-24 VITALS
BODY MASS INDEX: 29.6 KG/M2 | DIASTOLIC BLOOD PRESSURE: 80 MMHG | RESPIRATION RATE: 16 BRPM | WEIGHT: 188.6 LBS | OXYGEN SATURATION: 97 % | SYSTOLIC BLOOD PRESSURE: 150 MMHG | TEMPERATURE: 97 F | HEART RATE: 84 BPM | HEIGHT: 67 IN

## 2021-02-24 DIAGNOSIS — I10 ESSENTIAL HYPERTENSION: ICD-10-CM

## 2021-02-24 DIAGNOSIS — Q38.3 TONGUE ABNORMALITY: Primary | ICD-10-CM

## 2021-02-24 DIAGNOSIS — E55.9 VITAMIN D DEFICIENCY: ICD-10-CM

## 2021-02-24 DIAGNOSIS — E11.9 TYPE 2 DIABETES MELLITUS WITHOUT COMPLICATION, WITHOUT LONG-TERM CURRENT USE OF INSULIN (HCC): ICD-10-CM

## 2021-02-24 DIAGNOSIS — Z13.31 SCREENING FOR DEPRESSION: ICD-10-CM

## 2021-02-24 PROBLEM — N95.9 MENOPAUSAL AND POSTMENOPAUSAL DISORDER: Status: RESOLVED | Noted: 2019-09-09 | Resolved: 2021-02-24

## 2021-02-24 PROBLEM — R07.9 CHEST PAIN: Status: RESOLVED | Noted: 2019-06-26 | Resolved: 2021-02-24

## 2021-02-24 PROBLEM — K43.2 INCISIONAL HERNIA: Status: RESOLVED | Noted: 2019-09-09 | Resolved: 2021-02-24

## 2021-02-24 PROBLEM — H81.312 VERTIGO, AURAL, LEFT: Status: RESOLVED | Noted: 2019-03-04 | Resolved: 2021-02-24

## 2021-02-24 PROBLEM — M19.042 PRIMARY OSTEOARTHRITIS OF BOTH HANDS: Status: RESOLVED | Noted: 2017-11-24 | Resolved: 2021-02-24

## 2021-02-24 PROBLEM — M19.041 PRIMARY OSTEOARTHRITIS OF BOTH HANDS: Status: RESOLVED | Noted: 2017-11-24 | Resolved: 2021-02-24

## 2021-02-24 PROBLEM — R10.11 RIGHT UPPER QUADRANT ABDOMINAL PAIN: Status: RESOLVED | Noted: 2019-06-03 | Resolved: 2021-02-24

## 2021-02-24 PROCEDURE — G8417 CALC BMI ABV UP PARAM F/U: HCPCS | Performed by: INTERNAL MEDICINE

## 2021-02-24 PROCEDURE — G8510 SCR DEP NEG, NO PLAN REQD: HCPCS | Performed by: INTERNAL MEDICINE

## 2021-02-24 PROCEDURE — G8753 SYS BP > OR = 140: HCPCS | Performed by: INTERNAL MEDICINE

## 2021-02-24 PROCEDURE — G8536 NO DOC ELDER MAL SCRN: HCPCS | Performed by: INTERNAL MEDICINE

## 2021-02-24 PROCEDURE — 99214 OFFICE O/P EST MOD 30 MIN: CPT | Performed by: INTERNAL MEDICINE

## 2021-02-24 PROCEDURE — 1090F PRES/ABSN URINE INCON ASSESS: CPT | Performed by: INTERNAL MEDICINE

## 2021-02-24 PROCEDURE — G8754 DIAS BP LESS 90: HCPCS | Performed by: INTERNAL MEDICINE

## 2021-02-24 PROCEDURE — G8399 PT W/DXA RESULTS DOCUMENT: HCPCS | Performed by: INTERNAL MEDICINE

## 2021-02-24 PROCEDURE — G8427 DOCREV CUR MEDS BY ELIG CLIN: HCPCS | Performed by: INTERNAL MEDICINE

## 2021-02-24 PROCEDURE — 1101F PT FALLS ASSESS-DOCD LE1/YR: CPT | Performed by: INTERNAL MEDICINE

## 2021-02-24 NOTE — PROGRESS NOTES
Cara Ramos is a 81 y.o.  female presents today for office visit for visit. Pt would also like to discuss dizziness. Pt is not fasting. Pt is in Room # 2      1. Have you been to the ER, urgent care clinic since your last visit? Hospitalized since your last visit? No    2. Have you seen or consulted any other health care providers outside of the [address] [place] [address] since your last visit? Include any pap smears or colon screening. No    Upcoming Appts  none    Health Maintenance reviewed      VORB: No orders of the defined types were placed in this encounter.   Eugene Esposito, Mike Patel LPN

## 2021-02-25 NOTE — PROGRESS NOTES
Internal Medicine Progress Note    Today's Date:  2021   Patient:  Jose D Magallanes  Patient :  1935    Subjective:     Chief Complaint   Patient presents with    Skin Problem     tongue getting stuck with cpap    Diabetes    Hypertension      Diabetes mellitus  This is a chronic problem, new to me. BS is at goal. Pt is on metformin. Pt is compliant with these medications. Hypertension   This is a chronic problem, new to me. BP is at goal. Pt takes amlodipine, losartan and hydrochlorothiazide. Pt reports compliance with these medications. Tongue abnormality  This is a new problem. This is not controlled. Pt has tried to relieve this with mouthwash. This is not effective. 3 most recent PHQ Screens 2021   Little interest or pleasure in doing things Not at all   Feeling down, depressed, irritable, or hopeless Not at all   Total Score PHQ 2 0      Past Medical History:   Diagnosis Date    Anemia     Chronic allergic rhinitis     Diabetes ([facility] [address]     Glaucoma     Hypercholesterolemia     Hypertension     Iron deficiency     Multiple thyroid nodules     neg (FNA)    BRIAN (obstructive sleep apnea)     Sleep apnea     Thyroid disease     lumps on thyroid    Trigger finger      Past Surgical History:   Procedure Laterality Date    HX BUNIONECTOMY      HX CATARACT REMOVAL  2012    correction    HX HERNIA REPAIR      HX HYSTERECTOMY        reports that she quit smoking about 51 years ago. She smoked 0.25 packs per day. She has never used smokeless tobacco. She reports that she does not drink alcohol or use drugs. History reviewed. No pertinent family history.   Allergies   Allergen Reactions    Latex, Natural Rubber Other (comments)    Adhesive Contact Dermatitis     LEAVES DA ON SKIN    USE PAPER TAPE     Current Outpatient Meds     Current Outpatient Medications on File Prior to Visit   Medication Sig Dispense Refill    fluticasone propionate (FLONASE) 50 mcg/actuation nasal spray 2 sprays in each nostril nightly. 1 Bottle 1    Omeprazole delayed release (PRILOSEC D/R) 20 mg tablet Take 1 Tab by mouth two (2) times a day. 180 Tab 1    metFORMIN (Glucophage) 500 mg tablet Take 1 Tab by mouth two (2) times daily (with meals). Indications: type 2 diabetes mellitus 180 Tab 1    losartan (Cozaar) 100 mg tablet Take 1 Tab by mouth daily. 90 Tab 3    hydroCHLOROthiazide (MICROZIDE) 12.5 mg capsule Take 1 Cap by mouth every other day. 45 Cap 3    glucose blood VI test strips (FreeStyle Lite Strips) strip Use as directed to check blood sugar once a day 100 Strip 3    amLODIPine (NORVASC) 2.5 mg tablet Take 1 Tab by mouth daily. 90 Tab 1    psyllium husk (METAMUCIL PO) Take 1 Cap by mouth daily.  clobetasol (TEMOVATE) 0.05 % topical cream Apply 1 g to affected area two (2) times a day.  latanoprost (XALATAN) 0.005 % ophthalmic solution Xalatan 0.005 % eye drops    1 drop every day by ophthalmic route.  meclizine (ANTIVERT) 25 mg tablet Take 1 Tab by mouth three (3) times daily as needed. 30 Tab 0    Lancets (FREESTYLE LANCETS) misc Use as directed to check blood sugar once a day. 100 Each 3    [DISCONTINUED] Blood-Glucose Meter (FREESTYLE LITE METER) monitoring kit Use as directed to check blood sugar once a day 1 Kit 0     No current facility-administered medications on file prior to visit.       Objective:       Visit Vitals  BP (!) 150/80   Pulse 84   Temp 97 °F (36.1 °C) (Temporal)   Resp 16   Ht 5' 7\" (1.702 m)   Wt 188 lb 9.6 oz (85.5 kg)   LMP  (LMP Unknown)   SpO2 97%   BMI 29.54 kg/m²     General:   Well-nourished, well-groomed, pleasant, alert, in no acute distress  Head:  Normocephalic, atraumatic  Ears:  External ears WNL  Nose:  External nares WNL  Psych:  No pressured speech, no abnormal thought content  Diabetic foot exam: monofilament exam normal, no open lesions or or erythema    Lab Results   Component Value Date/Time    GFR est non-AA 60 (L) 01/15/2021 10:47 AM    GFR est AA >60 01/15/2021 10:47 AM    Creatinine 0.90 01/15/2021 10:47 AM    BUN 15 01/15/2021 10:47 AM    Sodium 141 01/15/2021 10:47 AM    Potassium 4.6 01/15/2021 10:47 AM    Chloride 104 01/15/2021 10:47 AM    CO2 33 (H) 01/15/2021 10:47 AM      Assessment/Plan & Orders:         ICD-10-CM ICD-9-CM    1. Tongue abnormality  Q38.3 750.10    2. Type 2 diabetes mellitus without complication, without long-term current use of insulin (HCC)  E11.9 250.00 CBC WITH AUTOMATED DIFF      LIPID PANEL      METABOLIC PANEL, COMPREHENSIVE      HEMOGLOBIN A1C WITH EAG      HM DIABETES FOOT EXAM   3. Essential hypertension  I10 401.9    4. Vitamin D deficiency  E55.9 268.9 VITAMIN D, 25 HYDROXY   5. Screening for depression  Z13.31 V79.0 [place] DEPRESSION SCREEN ANNUAL     Pt to call her ENT to seek care about her tongue problem    Follow-up and Dispositions    · Return in about 5 months (around 7/24/2021) for Go over labs/imaging. *Patient verbalized understanding and agreement with the plan. Patient was given an after-visit summary. Leena Wynn.  [address], MD - Internal Medicine  2/24/2021, 11:13 PM  [facility]ly [facility]  [address] [place], [address]  Phone [phone]  Fax [phone]

## 2021-02-25 NOTE — PATIENT INSTRUCTIONS

## 2021-03-08 NOTE — TELEPHONE ENCOUNTER
Patient is requesting a refill on the following medication. Requested Prescriptions     Pending Prescriptions Disp Refills    amLODIPine (NORVASC) 2.5 mg tablet 90 Tab 1     Sig: Take 1 Tab by mouth daily.

## 2021-03-09 RX ORDER — AMLODIPINE BESYLATE 2.5 MG/1
2.5 TABLET ORAL DAILY
Qty: 90 TAB | Refills: 1 | OUTPATIENT
Start: 2021-03-09

## 2021-03-09 NOTE — TELEPHONE ENCOUNTER
Recommend pt monitor blood pressure at home without amdlodipine. If high, then can double up on hydrochlorothiazide. Pt should see me virtually in one week to follow up.  If she doesn't have a blood pressure cuff at home, can get one or come in the office for a blood pressure check

## 2021-03-18 ENCOUNTER — VIRTUAL VISIT (OUTPATIENT)
Dept: FAMILY MEDICINE CLINIC | Age: 86
End: 2021-03-18
Payer: MEDICARE

## 2021-03-18 VITALS — BODY MASS INDEX: 28.72 KG/M2 | HEIGHT: 67 IN | WEIGHT: 183 LBS

## 2021-03-18 DIAGNOSIS — E04.1 THYROID NODULE: ICD-10-CM

## 2021-03-18 DIAGNOSIS — I10 ESSENTIAL HYPERTENSION: Primary | ICD-10-CM

## 2021-03-18 DIAGNOSIS — E11.9 TYPE 2 DIABETES MELLITUS WITHOUT COMPLICATION, WITHOUT LONG-TERM CURRENT USE OF INSULIN (HCC): ICD-10-CM

## 2021-03-18 PROCEDURE — 99443 PR PHYS/QHP TELEPHONE EVALUATION 21-30 MIN: CPT | Performed by: INTERNAL MEDICINE

## 2021-03-18 NOTE — PROGRESS NOTES
Adonis Moran is a 81 y.o. female, evaluated via audio-only technology on 3/18/2021 for Hypertension (follow up ), Diabetes, and Thyroid Problem    Assessment & Plan:   Diagnoses and all orders for this visit:    1. Essential hypertension  -     BSI MYCHART BP FLOWSHEET    2. Thyroid nodule  -     US THYROID/PARATHYROID/SOFT TISS; Future    3. Type 2 diabetes mellitus without complication, without long-term current use of insulin (HCC)    Stop hydrochlorothiazide    Follow-up and Dispositions    · Return in about 17 weeks (around 7/15/2021) for Go over labs/imaging. Subjective:     Diabetes mellitus  This is a chronic problem. BS is at goal. Pt is on metformin. Pt is compliant with these medications. Hypertension   This is a chronic problem. BP is at goal. Pt takes losartan and hydrochlorothiazide. Pt reports compliance with these medications. Thyroid nodule  This is a chronic problem. This is stable controlled. Last thyroid ultrasound was in 2018. It showed a 2.4 cm nodule on the right thyroid gland. 3 most recent PHQ Screens 3/18/2021   Little interest or pleasure in doing things Not at all   Feeling down, depressed, irritable, or hopeless Not at all   Total Score PHQ 2 0      Prior to Admission medications    Medication Sig Start Date End Date Taking? Authorizing Provider   fluticasone propionate (FLONASE) 50 mcg/actuation nasal spray 2 sprays in each nostril nightly. 1/7/21  Yes Darek Pinto MD   Omeprazole delayed release (PRILOSEC D/R) 20 mg tablet Take 1 Tab by mouth two (2) times a day. 12/28/20  Yes Darek Pinto MD   metFORMIN (Glucophage) 500 mg tablet Take 1 Tab by mouth two (2) times daily (with meals). Indications: type 2 diabetes mellitus 11/24/20  Yes Hiren Hernandez MD   losartan (Cozaar) 100 mg tablet Take 1 Tab by mouth daily.  8/24/20  Yes Shauna Hernandez MD   hydroCHLOROthiazide (MICROZIDE) 12.5 mg capsule Take 1 Cap by mouth every other day. 8/24/20  Yes Jillian Cardoza MD   glucose blood VI test strips (FreeStyle Lite Strips) strip Use as directed to check blood sugar once a day 4/27/20  Yes Casandra Edward PA-C   psyllium husk (METAMUCIL PO) Take 1 Cap by mouth daily. Yes Provider, Historical   clobetasol (TEMOVATE) 0.05 % topical cream Apply 1 g to affected area two (2) times a day. 8/10/19  Yes Provider, Historical   latanoprost (XALATAN) 0.005 % ophthalmic solution Xalatan 0.005 % eye drops    1 drop every day by ophthalmic route. Yes Provider, Historical   Lancets (FREESTYLE LANCETS) misc Use as directed to check blood sugar once a day. 7/30/14  Yes Justino Hernandez MD   amLODIPine (NORVASC) 2.5 mg tablet Take 1 Tab by mouth daily. 4/10/20 3/18/21  Holly Edward PA-C   meclizine (ANTIVERT) 25 mg tablet Take 1 Tab by mouth three (3) times daily as needed. 5/24/17   Jillian Cardoza MD     Patient Active Problem List   Diagnosis Code    Essential hypertension I10    Diabetes mellitus ([facility] [address] E11.9    Hot flashes, menopausal N95.1    Vertigo R42    Chronic allergic rhinitis J30.9    Thyroid nodule E04.1     Demetrio Barnett, who was evaluated through a patient-initiated, synchronous (real-time) audio only encounter, and/or her healthcare decision maker, is aware that it is a billable service, with coverage as determined by her insurance carrier. She provided verbal consent to proceed: Yes. She has not had a related appointment within my department in the past 7 days or scheduled within the next 24 hours.     Total Time: minutes: 21-30 minutes    Izaiah Roger MD

## 2021-03-18 NOTE — PROGRESS NOTES
Niki Godfrey is a 81 y.o. female (: 1935) presenting to address:    Chief Complaint   Patient presents with    Hypertension     follow up        Vitals:    21 1003   Weight: 183 lb (83 kg)   Height: 5' 7\" (1.702 m)       Is someone accompanying this pt? NO    Is the patient using any DME equipment during OV? NO    Hearing/Vision:   No exam data present    Learning Assessment:     Learning Assessment 2014   PRIMARY LEARNER Patient   PRIMARY LANGUAGE ENGLISH   LEARNER PREFERENCE PRIMARY DEMONSTRATION     LISTENING     PICTURES   ANSWERED BY patient   RELATIONSHIP SELF     Depression Screening:     3 most recent PHQ Screens 3/18/2021   Little interest or pleasure in doing things Not at all   Feeling down, depressed, irritable, or hopeless Not at all   Total Score PHQ 2 0     Fall Risk Assessment:     Fall Risk Assessment, last 12 mths 2021   Able to walk? Yes   Fall in past 12 months? 0   Do you feel unsteady? 0   Are you worried about falling 0   Number of falls in past 12 months -   Fall with injury? -     Coordination of Care Questionaire:   1. Have you been to the ER, urgent care clinic since your last visit? Hospitalized since your last visit? NO    2. Have you seen or consulted any other health care providers outside of the [address] [place] [address] since your last visit? Include any pap smears or colon screening. YES dermatology, cardiologist     Advanced Directive:   1. Do you have an Advanced Directive? YES    2. Would you like information on Advanced Directives?  NO

## 2021-03-18 NOTE — PATIENT INSTRUCTIONS
High Blood Pressure: Care Instructions Overview It's normal for blood pressure to go up and down throughout the day. But if it stays up, you have high blood pressure. Another name for high blood pressure is hypertension. Despite what a lot of people think, high blood pressure usually doesn't cause headaches or make you feel dizzy or lightheaded. It usually has no symptoms. But it does increase your risk of stroke, heart attack, and other problems. You and your doctor will talk about your risks of these problems based on your blood pressure. Your doctor will give you a goal for your blood pressure. Your goal will be based on your health and your age. Lifestyle changes, such as eating healthy and being active, are always important to help lower blood pressure. You might also take medicine to reach your blood pressure goal. 
Follow-up care is a key part of your treatment and safety. Be sure to make and go to all appointments, and call your doctor if you are having problems. It's also a good idea to know your test results and keep a list of the medicines you take. How can you care for yourself at home? Medical treatment · If you stop taking your medicine, your blood pressure will go back up. You may take one or more types of medicine to lower your blood pressure. Be safe with medicines. Take your medicine exactly as prescribed. Call your doctor if you think you are having a problem with your medicine. · Talk to your doctor before you start taking aspirin every day. Aspirin can help certain people lower their risk of a heart attack or stroke. But taking aspirin isn't right for everyone, because it can cause serious bleeding. · See your doctor regularly. You may need to see the doctor more often at first or until your blood pressure comes down. · If you are taking blood pressure medicine, talk to your doctor before you take decongestants or anti-inflammatory medicine, such as ibuprofen.  Some of these medicines can raise blood pressure. · Learn how to check your blood pressure at home. Lifestyle changes · Stay at a healthy weight. This is especially important if you put on weight around the waist. Losing even 10 pounds can help you lower your blood pressure. · If your doctor recommends it, get more exercise. Walking is a good choice. Bit by bit, increase the amount you walk every day. Try for at least 30 minutes on most days of the week. You also may want to swim, bike, or do other activities. · Avoid or limit alcohol. Talk to your doctor about whether you can drink any alcohol. · Try to limit how much sodium you eat to less than 2,300 milligrams (mg) a day. Your doctor may ask you to try to eat less than 1,500 mg a day. · Eat plenty of fruits (such as bananas and oranges), vegetables, legumes, whole grains, and low-fat dairy products. · Lower the amount of saturated fat in your diet. Saturated fat is found in animal products such as milk, cheese, and meat. Limiting these foods may help you lose weight and also lower your risk for heart disease. · Do not smoke. Smoking increases your risk for heart attack and stroke. If you need help quitting, talk to your doctor about stop-smoking programs and medicines. These can increase your chances of quitting for good. When should you call for help? Call  911 anytime you think you may need emergency care. This may mean having symptoms that suggest that your blood pressure is causing a serious heart or blood vessel problem. Your blood pressure may be over 180/120. For example, call 911 if: 
  · You have symptoms of a heart attack. These may include: 
? Chest pain or pressure, or a strange feeling in the chest. 
? Sweating. ? Shortness of breath. ? Nausea or vomiting. ? Pain, pressure, or a strange feeling in the back, neck, jaw, or upper belly or in one or both shoulders or arms. ? Lightheadedness or sudden weakness.  
? A fast or irregular heartbeat.  
  · You have symptoms of a stroke. These may include: 
? Sudden numbness, tingling, weakness, or loss of movement in your face, arm, or leg, especially on only one side of your body. ? Sudden vision changes. ? Sudden trouble speaking. ? Sudden confusion or trouble understanding simple statements. ? Sudden problems with walking or balance. ? A sudden, severe headache that is different from past headaches.  
  · You have severe back or belly pain. Do not wait until your blood pressure comes down on its own. Get help right away. Call your doctor now or seek immediate care if: 
  · Your blood pressure is much higher than normal (such as 180/120 or higher), but you don't have symptoms.  
  · You think high blood pressure is causing symptoms, such as: 
? Severe headache. 
? Blurry vision. Watch closely for changes in your health, and be sure to contact your doctor if: 
  · Your blood pressure measures higher than your doctor recommends at least 2 times. That means the top number is higher or the bottom number is higher, or both.  
  · You think you may be having side effects from your blood pressure medicine. Where can you learn more? Go to http://www.gray.com/ Enter Q282 in the search box to learn more about \"High Blood Pressure: Care Instructions. \" Current as of: December 16, 2019               Content Version: 12.6 © 3720-7438 [employer]. Care instructions adapted under license by [employer] (which disclaims liability or warranty for this information). If you have questions about a medical condition or this instruction, always ask your healthcare professional. [place] [address] any warranty or liability for your use of this information.

## 2021-03-30 ENCOUNTER — PATIENT MESSAGE (OUTPATIENT)
Dept: FAMILY MEDICINE CLINIC | Age: 86
End: 2021-03-30

## 2021-04-01 ENCOUNTER — TELEPHONE (OUTPATIENT)
Dept: FAMILY MEDICINE CLINIC | Age: 86
End: 2021-04-01

## 2021-04-01 NOTE — TELEPHONE ENCOUNTER
Patient called the office informing LPN that she has swelling around hr ankles. Patient said PCP took her off of HCTZ and Norvasc. Patient BP reading this morning is 153/89 HR:87 NO SOB, dizziness or lightheaded. LPN informed patient to go the ER if any changes.

## 2021-05-10 DIAGNOSIS — E11.9 TYPE 2 DIABETES MELLITUS WITHOUT COMPLICATION, WITHOUT LONG-TERM CURRENT USE OF INSULIN (HCC): ICD-10-CM

## 2021-05-10 RX ORDER — BLOOD-GLUCOSE METER
KIT MISCELLANEOUS
Qty: 100 STRIP | Refills: 0 | Status: SHIPPED | OUTPATIENT
Start: 2021-05-10

## 2021-05-10 RX ORDER — METFORMIN HYDROCHLORIDE 500 MG/1
500 TABLET ORAL 2 TIMES DAILY WITH MEALS
Qty: 180 TAB | Refills: 0 | Status: SHIPPED | OUTPATIENT
Start: 2021-05-10

## 2021-05-10 NOTE — TELEPHONE ENCOUNTER
Requested Prescriptions     Pending Prescriptions Disp Refills    metFORMIN (Glucophage) 500 mg tablet 180 Tab 1     Sig: Take 1 Tab by mouth two (2) times daily (with meals).  Indications: type 2 diabetes mellitus    glucose blood VI test strips (FreeStyle Lite Strips) strip 100 Strip 3     Sig: Use as directed to check blood sugar once a day     Future Appointments   Date Time Provider Kelin Wilkins   5/19/2021 10:00 AM MD JULITA Bauer BS AMB

## 2021-05-19 ENCOUNTER — OFFICE VISIT (OUTPATIENT)
Dept: FAMILY MEDICINE CLINIC | Age: 86
End: 2021-05-19
Payer: MEDICARE

## 2021-05-19 VITALS
DIASTOLIC BLOOD PRESSURE: 80 MMHG | HEART RATE: 94 BPM | TEMPERATURE: 98.9 F | RESPIRATION RATE: 16 BRPM | HEIGHT: 67 IN | WEIGHT: 187 LBS | SYSTOLIC BLOOD PRESSURE: 150 MMHG | BODY MASS INDEX: 29.35 KG/M2 | OXYGEN SATURATION: 99 %

## 2021-05-19 DIAGNOSIS — I10 ESSENTIAL HYPERTENSION: ICD-10-CM

## 2021-05-19 DIAGNOSIS — E55.9 VITAMIN D DEFICIENCY: ICD-10-CM

## 2021-05-19 DIAGNOSIS — J30.9 CHRONIC ALLERGIC RHINITIS: Primary | ICD-10-CM

## 2021-05-19 DIAGNOSIS — M54.41 CHRONIC MIDLINE LOW BACK PAIN WITH BILATERAL SCIATICA: ICD-10-CM

## 2021-05-19 DIAGNOSIS — M54.42 CHRONIC MIDLINE LOW BACK PAIN WITH BILATERAL SCIATICA: ICD-10-CM

## 2021-05-19 DIAGNOSIS — G89.29 CHRONIC MIDLINE LOW BACK PAIN WITH BILATERAL SCIATICA: ICD-10-CM

## 2021-05-19 DIAGNOSIS — E11.9 TYPE 2 DIABETES MELLITUS WITHOUT COMPLICATION, WITHOUT LONG-TERM CURRENT USE OF INSULIN (HCC): ICD-10-CM

## 2021-05-19 PROCEDURE — 1101F PT FALLS ASSESS-DOCD LE1/YR: CPT | Performed by: INTERNAL MEDICINE

## 2021-05-19 PROCEDURE — G8399 PT W/DXA RESULTS DOCUMENT: HCPCS | Performed by: INTERNAL MEDICINE

## 2021-05-19 PROCEDURE — G8753 SYS BP > OR = 140: HCPCS | Performed by: INTERNAL MEDICINE

## 2021-05-19 PROCEDURE — G8754 DIAS BP LESS 90: HCPCS | Performed by: INTERNAL MEDICINE

## 2021-05-19 PROCEDURE — G8536 NO DOC ELDER MAL SCRN: HCPCS | Performed by: INTERNAL MEDICINE

## 2021-05-19 PROCEDURE — 1090F PRES/ABSN URINE INCON ASSESS: CPT | Performed by: INTERNAL MEDICINE

## 2021-05-19 PROCEDURE — 99214 OFFICE O/P EST MOD 30 MIN: CPT | Performed by: INTERNAL MEDICINE

## 2021-05-19 PROCEDURE — G8427 DOCREV CUR MEDS BY ELIG CLIN: HCPCS | Performed by: INTERNAL MEDICINE

## 2021-05-19 PROCEDURE — G8510 SCR DEP NEG, NO PLAN REQD: HCPCS | Performed by: INTERNAL MEDICINE

## 2021-05-19 PROCEDURE — G8417 CALC BMI ABV UP PARAM F/U: HCPCS | Performed by: INTERNAL MEDICINE

## 2021-05-19 RX ORDER — HYDROCHLOROTHIAZIDE 25 MG/1
25 TABLET ORAL DAILY
Qty: 90 TABLET | Refills: 0 | Status: SHIPPED | OUTPATIENT
Start: 2021-05-19

## 2021-05-19 RX ORDER — LORATADINE 10 MG/1
10 TABLET ORAL
Qty: 90 TABLET | Refills: 0 | Status: SHIPPED | OUTPATIENT
Start: 2021-05-19

## 2021-05-19 RX ORDER — DOCUSATE SODIUM 100 MG/1
100 CAPSULE, LIQUID FILLED ORAL 2 TIMES DAILY
COMMUNITY

## 2021-05-19 NOTE — PROGRESS NOTES
Internal Medicine Progress Note    Today's Date:  2021   Patient:  George Contreras  Patient :  1935    Subjective:     Chief Complaint   Patient presents with    Hypertension    Thyroid Problem    Diabetes    Allergies      Diabetes mellitus  This is a chronic problem. A1c is at goal. Pt is on metformin. Pt is compliant with this medication. Hypertension   This is a chronic problem. BP is at goal. Pt takes losartan. Pt reports compliance with this medication. Allergies   This is a chronic problem. This is not controlled. Pt takes fluticasone proprionate. This is not effective. 3 most recent PHQ Screens 2021   Little interest or pleasure in doing things Not at all   Feeling down, depressed, irritable, or hopeless Not at all   Total Score PHQ 2 0      Past Medical History:   Diagnosis Date    Anemia     Chronic allergic rhinitis     Diabetes ([address]     Glaucoma     Hypercholesterolemia     Hypertension     Iron deficiency     Multiple thyroid nodules     neg (FNA)    BRIAN (obstructive sleep apnea)     Sleep apnea     Thyroid disease     lumps on thyroid    Trigger finger      Past Surgical History:   Procedure Laterality Date    HX BUNIONECTOMY      HX CATARACT REMOVAL  2012    correction    HX HERNIA REPAIR      HX HYSTERECTOMY        reports that she quit smoking about 51 years ago. She smoked 0.25 packs per day. She has never used smokeless tobacco. She reports that she does not drink alcohol and does not use drugs. History reviewed. No pertinent family history. Allergies   Allergen Reactions    Latex, Natural Rubber Other (comments)    Adhesive Contact Dermatitis     LEAVES DA ON SKIN    USE PAPER TAPE     Current Outpatient Meds     Current Outpatient Medications on File Prior to Visit   Medication Sig Dispense Refill    docusate sodium (Colace) 100 mg capsule Take 100 mg by mouth two (2) times a day.       metFORMIN (Glucophage) 500 mg tablet Take 1 Tab by mouth two (2) times daily (with meals). Indications: type 2 diabetes mellitus 180 Tab 0    glucose blood VI test strips (FreeStyle Lite Strips) strip Use as directed to check blood sugar once a day 100 Strip 0    fluticasone propionate (FLONASE) 50 mcg/actuation nasal spray 2 sprays in each nostril nightly. 1 Bottle 1    Omeprazole delayed release (PRILOSEC D/R) 20 mg tablet Take 1 Tab by mouth two (2) times a day. 180 Tab 1    losartan (Cozaar) 100 mg tablet Take 1 Tab by mouth daily. 90 Tab 3    clobetasol (TEMOVATE) 0.05 % topical cream Apply 1 g to affected area two (2) times a day.  latanoprost (XALATAN) 0.005 % ophthalmic solution Xalatan 0.005 % eye drops    1 drop every day by ophthalmic route.  Lancets (FREESTYLE LANCETS) misc Use as directed to check blood sugar once a day. 100 Each 3    [DISCONTINUED] psyllium husk (METAMUCIL PO) Take 1 Cap by mouth daily. (Patient not taking: Reported on 5/19/2021)      [DISCONTINUED] meclizine (ANTIVERT) 25 mg tablet Take 1 Tab by mouth three (3) times daily as needed. (Patient not taking: Reported on 5/19/2021) 30 Tab 0     No current facility-administered medications on file prior to visit. Objective:       Visit Vitals  BP (!) 150/80 (BP 1 Location: Left upper arm, BP Patient Position: Sitting)   Pulse 94   Temp 98.9 °F (37.2 °C) (Temporal)   Resp 16   Ht 5' 7\" (1.702 m)   Wt 187 lb (84.8 kg)   LMP  (LMP Unknown)   SpO2 99%   BMI 29.29 kg/m²     Lab Results   Component Value Date/Time    GFR est non-AA 60 (L) 01/15/2021 10:47 AM    GFR est AA >60 01/15/2021 10:47 AM    Creatinine 0.90 01/15/2021 10:47 AM    BUN 15 01/15/2021 10:47 AM    Sodium 141 01/15/2021 10:47 AM    Potassium 4.6 01/15/2021 10:47 AM    Chloride 104 01/15/2021 10:47 AM    CO2 33 (H) 01/15/2021 10:47 AM      Assessment/Plan & Orders:         ICD-10-CM ICD-9-CM    1. Chronic allergic rhinitis  J30.9 477.9 loratadine (Claritin) 10 mg tablet   2.  Essential hypertension I10 401.9 hydroCHLOROthiazide (HYDRODIURIL) 25 mg tablet   3. Type 2 diabetes mellitus without complication, without long-term current use of insulin (HCC)  E11.9 250.00 CBC WITH AUTOMATED DIFF      LIPID PANEL      METABOLIC PANEL, COMPREHENSIVE      HEMOGLOBIN A1C WITH EAG   4. Chronic midline low back pain with bilateral sciatica  M54.41 724.2 REFERRAL TO PHYSICAL THERAPY    M54.42 724.3     G89.29 338.29    5. Vitamin D deficiency  E55.9 268.9 VITAMIN D, 25 HYDROXY     Follow-up and Dispositions    · Return in about 3 months (around 8/19/2021) for Hypertension, Pain. *Patient verbalized understanding and agreement with the plan. Patient was given an after-visit summary. [place].  [id number] MD Julian - Internal Medicine  5/21/2021, 11:13 PM  [facility]  [address] [place], [address]  Phone [phone]  Fax [phone]

## 2021-05-19 NOTE — PATIENT INSTRUCTIONS
Counting Carbohydrates: Care Instructions Your Care Instructions You don't have to eat special foods when you have diabetes. You just have to be careful to eat healthy foods. Carbohydrates (carbs) raise blood sugar higher and quicker than any other nutrient. Carbs are found in desserts, breads and cereals, and fruit. They're also in starchy vegetables. These include potatoes, corn, and grains such as rice and pasta. Carbs are also in milk and yogurt. The more carbs you eat at one time, the higher your blood sugar will rise. Spreading carbs all through the day helps keep your blood sugar levels within your target range. Counting carbs is one of the best ways to keep your blood sugar under control. If you use insulin, counting carbs helps you match the right amount of insulin to the number of grams of carbs in a meal. Then you can change your diet and insulin dose as needed. Testing your blood sugar several times a day can help you learn how carbs affect your blood sugar. A registered dietitian or certified diabetes educator can help you plan meals and snacks. Follow-up care is a key part of your treatment and safety. Be sure to make and go to all appointments, and call your doctor if you are having problems. It's also a good idea to know your test results and keep a list of the medicines you take. How can you care for yourself at home? Know your daily amount of carbohydrates Your daily amount depends on several things, such as your weight, how active you are, which diabetes medicines you take, and what your goals are for your blood sugar levels. A registered dietitian or certified diabetes educator can help you plan how many carbs to include in each meal and snack. For most adults, a guideline for the daily amount of carbs is: · 45 to 60 grams at each meal. That's about the same as 3 to 4 carbohydrate servings. · 15 to 20 grams at each snack. That's about the same as 1 carbohydrate serving. Count carbs Counting carbs lets you know how much rapid-acting insulin to take before you eat. If you use an insulin pump, you get a constant rate of insulin during the day. So the pump must be programmed at meals. This gives you extra insulin to cover the rise in blood sugar after meals. If you take insulin: 
· Learn your own insulin-to-carb ratio. You and your diabetes health professional will figure out the ratio. You can do this by testing your blood sugar after meals. For example, you may need a certain amount of insulin for every 15 grams of carbs. · Add up the carb grams in a meal. Then you can figure out how many units of insulin to take based on your insulin-to-carb ratio. · Exercise lowers blood sugar. You can use less insulin than you would if you were not doing exercise. Keep in mind that timing matters. If you exercise within 1 hour after a meal, your body may need less insulin for that meal than it would if you exercised 3 hours after the meal. Test your blood sugar to find out how exercise affects your need for insulin. If you do or don't take insulin: 
· Look at labels on packaged foods. This can tell you how many carbs are in a serving. You can also use guides from the American Diabetes Association. · Be aware of portions, or serving sizes. If a package has two servings and you eat the whole package, you need to double the number of grams of carbohydrate listed for one serving. · Protein, fat, and fiber do not raise blood sugar as much as carbs do. If you eat a lot of these nutrients in a meal, your blood sugar will rise more slowly than it would otherwise. Eat from all food groups · Eat at least three meals a day. · Plan meals to include food from all the food groups. The food groups include grains, fruits, dairy, proteins, and vegetables. · Talk to your dietitian or diabetes educator about ways to add limited amounts of sweets into your meal plan.  
· If you drink alcohol, talk to your doctor. It may not be recommended when you are taking certain diabetes medicines. Where can you learn more? Go to http://yusra-maria l.info/ Enter C483 in the search box to learn more about \"Counting Carbohydrates: Care Instructions. \" Current as of: August 31, 2020               Content Version: 12.8 © 4859-3522 [employer]. Care instructions adapted under license by [employer] (which disclaims liability or warranty for this information). If you have questions about a medical condition or this instruction, always ask your healthcare professional. [address] any warranty or liability for your use of this information.

## 2021-05-19 NOTE — PROGRESS NOTES
Orlin David is a 81 y.o.  female presents today for office visit for follow up. Pt would also like to discuss HTN. Pt is not fasting. Pt is in Room # 2      1. Have you been to the ER, urgent care clinic since your last visit? Hospitalized since your last visit? No    2. Have you seen or consulted any other health care providers outside of the [address] [place] [address] since your last visit? Include any pap smears or colon screening.  No    Upcoming Appts  none    Health Maintenance reviewed      VORB:   Orders Placed This Encounter    docusate sodium (Colace) 100 mg capsule   Arnie Arteaga LPN

## 2021-05-21 ENCOUNTER — TELEPHONE (OUTPATIENT)
Dept: FAMILY MEDICINE CLINIC | Age: 86
End: 2021-05-21

## 2021-05-21 RX ORDER — AZELASTINE 1 MG/ML
1 SPRAY, METERED NASAL 2 TIMES DAILY
Qty: 1 BOTTLE | Refills: 0 | Status: SHIPPED | OUTPATIENT
Start: 2021-05-21

## 2021-06-09 ENCOUNTER — HOSPITAL ENCOUNTER (OUTPATIENT)
Dept: PHYSICAL THERAPY | Age: 86
Discharge: HOME | End: 2021-06-09
Payer: MEDICARE

## 2021-06-09 PROCEDURE — 97110 THERAPEUTIC EXERCISES: CPT

## 2021-06-09 PROCEDURE — 97161 PT EVAL LOW COMPLEX 20 MIN: CPT

## 2021-06-09 PROCEDURE — 97530 THERAPEUTIC ACTIVITIES: CPT

## 2021-06-09 NOTE — PROGRESS NOTES
PHYSICAL THERAPY - DAILY TREATMENT NOTE    Patient Name: Orlin David        Date: 2021  : 1935   yes Patient  Verified  Visit #:   1   of   10  Insurance: Payor: Hyun Hudson / Plan: [place] MED[place]ARE PART A & B / Product Type: Medicare /      In time: 225 Out time: 855   Total Treatment Time: 51     Medicare/BCBS Time Tracking (below)   Total Timed Codes (min):  23 1:1 Treatment Time:  51     TREATMENT AREA =  Sciatica, left side [M54.32]    SUBJECTIVE  Pain Level (on 0 to 10 scale):  0  / 10   Medication Changes/New allergies or changes in medical history, any new surgeries or procedures?    no  If yes, update Summary List   Subjective Functional Status/Changes:  []  No changes reported   See Eval for subjective information and c/o. OBJECTIVE  15 min Therapeutic Exercise:  [x]  See flow sheet   Rationale:      increase ROM and increase strength to improve the patients ability to perform activities and decrease pain with movement. 8 min Therapeutic Activity: [x]  See flow sheet  Body part LS; Patient was instructed in the importance of completing HEP as directed and in body mechanics/ergonomic lifting. Postural re-education was provided and instruction/education on obtaining neutral spine/pelvis. Education on the correlation of TA strength to L-S stability; logroll; avoidance of aggravating factors     Rationale:    improve coordination, improve balance and increase proprioception to  improve the patients ability to Tolerate basic ADLs and job-related tasks without pain.        Billed With/As:   [x] TE   [] TA   [] Neuro   [] Self Care Patient Education: [x] Review HEP    [] Progressed/Changed HEP based on:   [x] positioning   [x] body mechanics   [x] transfers   [] heat/ice application    [] other:      Other Objective/Functional Measures:    See Eval     Post Treatment Pain Level (on 0 to 10) scale:   0  / 10     ASSESSMENT  Assessment/Changes in Function:     See Eval     []  See Progress Note/Recertification   Patient will continue to benefit from skilled PT services to modify and progress therapeutic interventions to attain remaining goals. Progress toward goals / Updated goals:  Pt denied sharp pain or red flags with initial eval or therapeutic ex. See initial eval. HEP administered.       PLAN  []  Upgrade activities as tolerated yes Continue plan of care   []  Discharge due to :    []  Other:      Therapist: Lei Cardenas PT    Date: 6/9/2021 Time: 7:54 AM     Future Appointments   Date Time Provider Kelin Wilkins   6/9/2021  8:00 AM [address] [facility] [address] [facility]   8/18/2021  8:30 AM Aretha Meckel, MD [facility] BS AMB

## 2021-06-09 NOTE — PROGRESS NOTES
40 Aishwarya Zhang, [address], [facility] - Phone: [phone]  Fax: [phone] / 1057 [facility]  Patient Name: Abdiaziz Mims : 1935   Medical   Diagnosis: Sciatica, left side [M54.32] Treatment Diagnosis: LBP   Onset Date: chronic     Referral Source: Tanesha Godoy MD Start of Care [facility]): 2021   Prior Hospitalization: See medical history Provider #: [id number]   Prior Level of Function: I in all activities; able to ambulate > 5 miles   Comorbidities: Latex allergy, HTN, DM, scoliosis    Medications: Verified on Patient Summary List   The Plan of Care and following information is based on the information from the initial evaluation.   ========================================================================  Assessment / key information: Patient is a 81 y.o. female who presents to [facility] with a diagnosis of Sciatica, left side [M54.32]. Patient is her own historian. Living situation is as follows: with daughter in one story home. Occupation: NA. Pt presents with chronic insidious LBP which increases with prolonged standing and ambulating and decreases with sitting. Pt is very active and goes to the pool in the gym 5 days a week. Her chief c/o difficulty with prolonged ambulation and she reports only being able to ambulate < 4 blocks whereas in the past she was able to ambulate >/= 5 miles at a time. Pt is without LE buckling/LOB/falls/paresthesias/numbness/bowl or bladder issues/pain with coughing and/or sneezing, and without saddle anestheia. No radicular s/s. She does not use an AD to ambulate. She uses a shopping cart to decrease LBP in the grocery store. No imaging to date. Negative red flags.   Current Deficits include: pain, decreased mobility, decreased strength, and decreased postural awareness with resulting limitations in ADL's and in functional abilities. Patient will benefit from a comprehensive POC/HEP to address impairments and restore function in order to return to prior level of function and prevent secondary impairments. Impairments are as follows:   Pain: current pain level 0/10, pain level at worst 5/10, and pain level at best 0/10 TTP B L-S paraspinals  Posture + right L-S curvature present with B pes planus and genu varum, for head and rounded shoulders  Gait antalgic with slight R trunk lean throughout and path deviation  AROM/PROM (in degrees unless otherwise specified)L-S flexion WFLs, ext poor forward translation of ASIS over feet, side flexion reduced by 50% and rotation reduced by 50%  Strength BLEs grossly 4-/5 throughout and 3+/5 B hip abd  Special Tests + B FABERs, BLE dermatomes and myotomes intact  Functional Tests   Fall Risk Scale:   Romberg 30sec EO/EC    SLS  Pt result: R 14sec L 8sec  Norms:  Eyes open:  60-69 yrs/22.5 ± 8.6s  70-79 yrs/14.2 ± 9.3s  Eyes closed:  60-69 yrs/10.2 ± 8.6s  70-79 yrs/4.3 ± 3.0s    5x sit-stand test:  Pt result: 19in surface 15sec  Norms:  Age:  Time sec:   60-69  11.4  70-79  12.6  80-89  14.8  Predictions:  Falls>15sec  Vestibular >15sec  Parkinson's >16sec    Functional Deficits include: see aformentioned. Patient's FOTO score was a 52/100 indicating decreased function.  Patient will benefit from a POC addressing such impairments and limitations in order to improve quality of life and return to PLOF.    ========================================================================  Eval Complexity: History: HIGH Complexity :3+ comorbidities / personal factors will impact the outcome/ POC Exam:HIGH Complexity : 4+ Standardized tests and measures addressing body structure, function, activity limitation and / or participation in recreation  Presentation: LOW Complexity : Stable, uncomplicated  Clinical Decision Making:MEDIUM Complexity : FOTO score of 26-74Overall Complexity:LOW   Problem List: pain affecting function, decrease ROM, decrease strength, edema affecting function, impaired gait/ balance, decrease ADL/ functional abilitiies, decrease activity tolerance, decrease flexibility/ joint mobility and decrease transfer abilities   Treatment Plan may include any combination of the following: Therapeutic exercise, Therapeutic activities, Neuromuscular re-education, Physical agent/modality, Gait/balance training, Manual therapy, Aquatic therapy, Patient education, Self Care training, Functional mobility training, Home safety training and Stair training  Patient / Family readiness to learn indicated by: asking questions  Persons(s) to be included in education: patient (P)  Barriers to Learning/Limitations: None  Measures taken: A HEP was initiated to assist with POC in restoring function   Patient Goal (s): \"less or no pain\"   Patient self reported health status: fair  Rehabilitation Potential: excellent  Short Term Goals: To be accomplished in 2 treatments: 1. Goal: Patient will be independent and compliant with HEP in order to progress toward long term goals. Status at last note/certification: issued and reviewed    Long Term Goals: To be accomplished in 10 treatments: 1. Goal: Patient will improve FOTO assessment score to 62 pts in order to indicate improved functional abilities. Status at last note/certification: 52  2. Goal: Patient will report being able to ambulate >/= 10 blocks without LBP >/= 4/10 VAS in order to return to walking regimen         Status at last note/certification: 4 blocks with pain  3. Goal: Patient will demonstrate a 30 sec SLS on each LE in order to decrease risk of falls         Status at last note/certification: R 34ASJ L 8sec  4. Goal: Patient will improve 5x sit-stand test from 19in surface to </= 12 sec in order to improve functional transfers and dcerease risk of falls              Status at last note/certification: 90WPK    Frequency / Duration: Patient to be seen  2  times per week for 10  treatments:  Patient / Caregiver education and instruction: exercises    Therapist Signature: Scotts Cornersmahesh Will PT Date: 6/0/8905   Certification Period: 90 days  6/9/21-9/7/21 Time: 7:53 AM   ========================================================================  I certify that the above Physical Therapy Services are being furnished while the patient is under my care. I agree with the treatment plan and certify that this therapy is necessary. Physician Signature:    _____  Date:     Time:______  Danny Johnson MD  Please sign and return to [facility], [facility]. or you may fax the signed copy to [phone]. Thank you.

## 2021-06-15 ENCOUNTER — HOSPITAL ENCOUNTER (OUTPATIENT)
Dept: PHYSICAL THERAPY | Age: 86
Discharge: HOME | End: 2021-06-15
Payer: MEDICARE

## 2021-06-15 PROCEDURE — 97110 THERAPEUTIC EXERCISES: CPT

## 2021-06-15 NOTE — PROGRESS NOTES
PHYSICAL THERAPY - DAILY TREATMENT NOTE    Patient Name: Clemetine Mortimer        Date: 6/15/2021  : 1935   yes Patient  Verified  Visit #:   2   of   10  Insurance: Payor: Gabe Copeland / Plan: [place] MED[place] PART [place] & B / Product Type: Medicare /      In time: 546 Out time: 910   Total Treatment Time: 40     Medicare/BCBS Time Tracking (below)   Total Timed Codes (min):  40 1:1 Treatment Time:  40     TREATMENT AREA =  Low back pain [M54.5]    SUBJECTIVE  Pain Level (on 0 to 10 scale):  0  / 10   Medication Changes/New allergies or changes in medical history, any new surgeries or procedures?    no  If yes, update Summary List   Subjective Functional Status/Changes:  []  No changes reported     Pt reports pain in the back and hips when she's been standing for too long. OBJECTIVE  40 min Therapeutic Exercise:  [x]  See flow sheet   Rationale:      increase ROM, increase strength, improve coordination, improve balance and increase proprioception to improve the patients ability to perform pain free ADLs. Billed With/As:   [x] TE   [] TA   [] Neuro   [] Self Care Patient Education: [x] Review HEP    [] Progressed/Changed HEP based on:   [] positioning   [] body mechanics   [] transfers   [] heat/ice application    [] other:      Other Objective/Functional Measures: Therex per flow sheet. Post Treatment Pain Level (on 0 to 10) scale:   0   / 10     ASSESSMENT  Assessment/Changes in Function:     Difficulty with standing for extended periods due to knee pain. No increase in back pain throughout session.       []  See Progress Note/Recertification   Patient will continue to benefit from skilled PT services to modify and progress therapeutic interventions, address functional mobility deficits, address ROM deficits, address strength deficits, analyze and address soft tissue restrictions, analyze and cue movement patterns, analyze and modify body mechanics/ergonomics and assess and modify postural abnormalities to attain remaining goals. Progress toward goals / Updated goals:    Initiated therex.       PLAN  [x]  Upgrade activities as tolerated yes Continue plan of care   []  Discharge due to :    []  Other:      Therapist: Jimmy Villegas PTA    Date: 6/15/2021 Time: 8:36 AM     Future Appointments   Date Time Provider Kelin Wilkins   6/22/2021  9:45 AM Hemalatha Edwards PTA [id number]   6/24/2021  8:00 AM Anita Dillon,  [address] [facility]   6/29/2021  8:00 AM Anita Dillon,  [address] [facility]   7/1/2021  8:45 AM Anita Dillon,  [address] [facility]   7/6/2021  9:30 AM Anita Dillon,  [address] [facility]   7/8/2021  9:30 AM Anita Dillon,  [address] [facility]   8/18/2021  8:30 AM Rossy Giang MD [facility]

## 2021-06-22 ENCOUNTER — HOSPITAL ENCOUNTER (OUTPATIENT)
Dept: PHYSICAL THERAPY | Age: 86
Discharge: HOME | End: 2021-06-22
Payer: MEDICARE

## 2021-06-22 PROCEDURE — 97110 THERAPEUTIC EXERCISES: CPT

## 2021-06-22 NOTE — PROGRESS NOTES
PHYSICAL THERAPY - DAILY TREATMENT NOTE    Patient Name: Lindsey Mendez        Date: 2021  : 1935   yes Patient  Verified  Visit #:   3   of   10  Insurance: Payor: Gerhardt Hinders / Plan: [place] MEDICARE PART A & B / Product Type: Medicare /      In time: 180 Out time: 1030   Total Treatment Time: 45     Medicare/BCBS Time Tracking (below)   Total Timed Codes (min):  45 1:1 Treatment Time:  45     TREATMENT AREA =  Low back pain [M54.5]    SUBJECTIVE  Pain Level (on 0 to 10 scale):  0  / 10   Medication Changes/New allergies or changes in medical history, any new surgeries or procedures?    no  If yes, update Summary List   Subjective Functional Status/Changes:  []  No changes reported     I didn't really get sore after the last visit, I felt pretty good. OBJECTIVE  45 min Therapeutic Exercise:  [x]  See flow sheet   Rationale:      increase ROM, increase strength, improve coordination, improve balance and increase proprioception to improve the patients ability to perform pain free ADLs. Billed With/As:   [x] TE   [] TA   [] Neuro   [] Self Care Patient Education: [x] Review HEP    [] Progressed/Changed HEP based on:   [] positioning   [] body mechanics   [] transfers   [] heat/ice application    [] other:      Other Objective/Functional Measures: Therex per flow sheet. Post Treatment Pain Level (on 0 to 10) scale:   0  / 10     ASSESSMENT  Assessment/Changes in Function:     Pt demonstrating compliance with HEP. Good tolerance to therex. []  See Progress Note/Recertification   Patient will continue to benefit from skilled PT services to modify and progress therapeutic interventions, address functional mobility deficits, address ROM deficits, address strength deficits, analyze and address soft tissue restrictions, analyze and cue movement patterns, analyze and modify body mechanics/ergonomics and assess and modify postural abnormalities to attain remaining goals.    Progress toward goals / Updated goals:    STG #1 met.       PLAN  [x]  Upgrade activities as tolerated yes Continue plan of care   []  Discharge due to :    []  Other:      Therapist: Alanis Peña PTA    Date: 6/22/2021 Time: 10:15 AM     Future Appointments   Date Time Provider Kelin Wilkins   6/24/2021  8:00 AM Bailey Faust, PT [address]   6/29/2021  8:00 AM Bailey Faust, PT [place] [facility]   7/1/2021  8:45 AM [facility]   7/6/2021  9:30 AM Bailey Faust, PT [place] [facility]   7/8/2021  9:30 AM Bailey Faust, PT [facility]   8/18/2021  8:30 AM Monique Giang MD [facility]

## 2021-06-24 ENCOUNTER — HOSPITAL ENCOUNTER (OUTPATIENT)
Dept: PHYSICAL THERAPY | Age: 86
Discharge: HOME | End: 2021-06-24
Payer: MEDICARE

## 2021-06-24 PROCEDURE — 97112 NEUROMUSCULAR REEDUCATION: CPT | Performed by: PHYSICAL THERAPIST

## 2021-06-24 PROCEDURE — 97530 THERAPEUTIC ACTIVITIES: CPT | Performed by: PHYSICAL THERAPIST

## 2021-06-24 PROCEDURE — 97110 THERAPEUTIC EXERCISES: CPT | Performed by: PHYSICAL THERAPIST

## 2021-06-24 PROCEDURE — 97140 MANUAL THERAPY 1/> REGIONS: CPT | Performed by: PHYSICAL THERAPIST

## 2021-06-24 NOTE — PROGRESS NOTES
[facility] PHYSICAL [facility]ER[facility] - DAILY TREATMENT NOTE    Patient Name: Saul Ellison        Date: 2021  : 1935   yes Patient  Verified  Visit #:      10  Insurance: Payor: Melody Gallagher / Plan: [place] MEDICARE PART A & B / Product Type: Medicare /      In time: 978 Out time: 812   Total Treatment Time: 60     Medicare/BCBS Time Tracking (below)   Total Timed Codes (min):  60 1:1 Treatment Time:  50     TREATMENT AREA =  Low back pain [M54.5]    SUBJECTIVE  Pain Level (on 0 to 10 scale):  0  / 10   Medication Changes/New allergies or changes in medical history, any new surgeries or procedures?    no  If yes, update Summary List   Subjective Functional Status/Changes:  []  No changes reported     Only time it really seems to bother me is if I do 3 hours of work or more. Then I can just stretch it out         OBJECTIVE    26 min Therapeutic Exercise:  [x]  See flow sheet   Rationale:      increase ROM, increase strength, improve coordination, improve balance and increase proprioception to improve the patients ability to complete adls     10 min Manual Therapy: stm ls paraspinals, R glutes, posterior ilial rotation    Rationale:      decrease pain, increase ROM, increase tissue extensibility and decrease trigger points to improve patient's ability to squat  The manual therapy interventions were performed at a separate and distinct time from the therapeutic activities interventions.     10 min Therapeutic Activity: [x]  See flow sheet   Rationale:    increase strength and improve coordination to improve the patients ability to squat    20 min Neuromuscular Re-ed: [x]  See flow sheet   Rationale:    improve coordination, improve balance and increase proprioception to improve the patients ability to perform static adls        Billed With/As:   [] TE   [] TA   [] Neuro   [] Self Care Patient Education: [x] Review HEP    [] Progressed/Changed HEP based on:   [] positioning   [] body mechanics   [] transfers   [] heat/ice application    [] other:      Other Objective/Functional Measures:  1:1 [id number]  Reduced R ilial posterior rotation - added mt to address with ability to perform knee to chest without compensation     Post Treatment Pain Level (on 0 to 10) scale:   0  / 10     ASSESSMENT  Assessment/Changes in Function:     No increase in pain following session but pt advised on doms      []  See Progress Note/Recertification   Patient will continue to benefit from skilled PT services to modify and progress therapeutic interventions, address functional mobility deficits, address ROM deficits, address strength deficits, analyze and address soft tissue restrictions, analyze and cue movement patterns, analyze and modify body mechanics/ergonomics, assess and modify postural abnormalities and instruct in home and community integration to attain remaining goals.    Progress toward goals / Updated goals:    Progress towards all goals     PLAN  []  Upgrade activities as tolerated yes Continue plan of care   []  Discharge due to :    []  Other:      Therapist: Geeta Tanner PT    Date: 6/24/2021 Time: 8:06 AM     Future Appointments   Date Time Provider Kelin Wilkins   6/29/2021  8:00 AM MARY [facility] [id number]   7/1/2021  8:45 AM MARY [address]   7/6/2021  9:30 AM Jacqueline Pollock  [address] [facility]   7/8/2021  9:30 AM Jacqueline Pollock PT [facility]   8/18/2021  8:30 AM Gurpreet Giang MD [facility]

## 2021-06-29 ENCOUNTER — HOSPITAL ENCOUNTER (OUTPATIENT)
Dept: PHYSICAL THERAPY | Age: 86
Discharge: HOME | End: 2021-06-29
Payer: MEDICARE

## 2021-06-29 PROCEDURE — 97112 NEUROMUSCULAR REEDUCATION: CPT | Performed by: PHYSICAL THERAPIST

## 2021-06-29 PROCEDURE — 97110 THERAPEUTIC EXERCISES: CPT | Performed by: PHYSICAL THERAPIST

## 2021-06-29 PROCEDURE — 97530 THERAPEUTIC ACTIVITIES: CPT | Performed by: PHYSICAL THERAPIST

## 2021-06-29 PROCEDURE — 97140 MANUAL THERAPY 1/> REGIONS: CPT | Performed by: PHYSICAL THERAPIST

## 2021-06-29 NOTE — PROGRESS NOTES
[facility] PHYSICAL THERAP[facility] - DAILY TREATMENT NOTE    Patient Name: Rohith Panchal        Date: 2021  : 1935   yes Patient  Verified  Visit #:      10  Insurance: Payor: Maritza Osei / Plan: [place] MEDICARE PART A & B / Product Type: Medicare /      In time: 721 Out time: 855   Total Treatment Time: 57     Medicare/BCBS Time Tracking (below)   Total Timed Codes (min):  57 1:1 Treatment Time:  57     TREATMENT AREA =  Low back pain [M54.5]    SUBJECTIVE  Pain Level (on 0 to 10 scale):  0  / 10   Medication Changes/New allergies or changes in medical history, any new surgeries or procedures?    no  If yes, update Summary List   Subjective Functional Status/Changes:  []  No changes reported       I feel like I can walk a longer period of time without pain or discomfort        OBJECTIVE    20 min Therapeutic Exercise:  [x]  See flow sheet   Rationale:      increase ROM, increase strength, improve coordination, improve balance and increase proprioception to improve the patients ability to complete adls     10 min Manual Therapy: stm ls paraspinals, R glutes, posterior ilial rotation    Rationale:      decrease pain, increase ROM, increase tissue extensibility and decrease trigger points to improve patient's ability to squat  The manual therapy interventions were performed at a separate and distinct time from the therapeutic activities interventions.     10 min Therapeutic Activity: [x]  See flow sheet   Rationale:    increase strength and improve coordination to improve the patients ability to squat    17 min Neuromuscular Re-ed: [x]  See flow sheet   Rationale:    improve coordination, improve balance and increase proprioception to improve the patients ability to transfer safely         Billed With/As:   [] TE   [] TA   [] Neuro   [] Self Care Patient Education: [x] Review HEP    [] Progressed/Changed HEP based on:   [] positioning   [] body mechanics   [] transfers   [] heat/ice application    [] other: Other Objective/Functional Measures:  Continued with similar treatment from previous session based on pt subjective intake   ttp along R lumbosacral junction with reduced posterior ilial rotation - added 90/90 to address    Post Treatment Pain Level (on 0 to 10) scale:   0  / 10     ASSESSMENT  Assessment/Changes in Function:     Normal hip swing during gait following session      []  See Progress Note/Recertification   Patient will continue to benefit from skilled PT services to modify and progress therapeutic interventions, address functional mobility deficits, address ROM deficits, address strength deficits, analyze and address soft tissue restrictions, analyze and cue movement patterns, analyze and modify body mechanics/ergonomics, assess and modify postural abnormalities and instruct in home and community integration to attain remaining goals.    Progress toward goals / Updated goals:  ltg #2 achieved this session        PLAN  []  Upgrade activities as tolerated yes Continue plan of care   []  Discharge due to :    []  Other:      Therapist: Apurva Forte PT    Date: 6/29/2021 Time: 8:06 AM     Future Appointments   Date Time Provider Kelin Wilkins   7/1/2021  8:45 AM MARY [facility] [id number]   7/6/2021  9:30 AM [facility]   7/8/2021  9:30 AM MARY Alan [facility]   8/18/2021  8:30 AM Velia Giang MD [facility]

## 2021-07-01 ENCOUNTER — HOSPITAL ENCOUNTER (OUTPATIENT)
Dept: PHYSICAL THERAPY | Age: 86
Discharge: HOME | End: 2021-07-01
Payer: MEDICARE

## 2021-07-01 PROCEDURE — 97140 MANUAL THERAPY 1/> REGIONS: CPT | Performed by: PHYSICAL THERAPIST

## 2021-07-01 PROCEDURE — 97110 THERAPEUTIC EXERCISES: CPT | Performed by: PHYSICAL THERAPIST

## 2021-07-01 PROCEDURE — 97530 THERAPEUTIC ACTIVITIES: CPT | Performed by: PHYSICAL THERAPIST

## 2021-07-01 NOTE — PROGRESS NOTES
[facility] PHYSICAL THERAPY - DAILY TREATMENT NOTE    Patient Name: Arabella Chamberlain        Date: 2021  : 1935   yes Patient  Verified  Visit #:   6   of   10  Insurance: Payor: Miguel Angel Tomas / Plan: [place] MEDICARE PART A & B / Product Type: Medicare /      In time: 432 Out time: 855   Total Treatment Time: 50     Medicare/BCBS Time Tracking (below)   Total Timed Codes (min):  50 1:1 Treatment Time:  50     TREATMENT AREA =  Low back pain [M54.5]    SUBJECTIVE  Pain Level (on 0 to 10 scale):  0  / 10   Medication Changes/New allergies or changes in medical history, any new surgeries or procedures?    no  If yes, update Summary List   Subjective Functional Status/Changes:  []  No changes reported     Feeling great        OBJECTIVE    20 min Therapeutic Exercise:  [x]  See flow sheet   Rationale:      increase ROM, increase strength, improve coordination, improve balance and increase proprioception to improve the patients ability to complete adls     10 min Manual Therapy: stm ls paraspinals, R glutes, posterior ilial rotation    Rationale:      decrease pain, increase ROM, increase tissue extensibility and decrease trigger points to improve patient's ability to squat  The manual therapy interventions were performed at a separate and distinct time from the therapeutic activities interventions.     10 min Therapeutic Activity: [x]  See flow sheet   Rationale:    increase strength and improve coordination to improve the patients ability to squat    10 min Neuromuscular Re-ed: [x]  See flow sheet   Rationale:    improve coordination, improve balance and increase proprioception to improve the patients ability to transfer safely         Billed With/As:   [] TE   [] TA   [] Neuro   [] Self Care Patient Education: [x] Review HEP    [] Progressed/Changed HEP based on:   [] positioning   [] body mechanics   [] transfers   [] heat/ice application    [] other:      Other Objective/Functional Measures:  Increased resistant on glute med exercises without c/o  Increased depth on squats with only vcs for appropriate hip hinge required   Post Treatment Pain Level (on 0 to 10) scale:   0  / 10     ASSESSMENT  Assessment/Changes in Function:     No increase in pain following session      []  See Progress Note/Recertification   Patient will continue to benefit from skilled PT services to modify and progress therapeutic interventions, address functional mobility deficits, address ROM deficits, address strength deficits, analyze and address soft tissue restrictions, analyze and cue movement patterns, analyze and modify body mechanics/ergonomics, assess and modify postural abnormalities and instruct in home and community integration to attain remaining goals.    Progress toward goals / Updated goals:  FOTO not working during time of patient session so unable to assess  Carryover of LTG #2 and ltg #4 achieved        PLAN  []  Upgrade activities as tolerated yes Continue plan of care   []  Discharge due to :    []  Other:      Therapist: Noel Alegre PT    Date: 7/1/2021 Time: 8:06 AM     Future Appointments   Date Time Provider Kelin Wilkins   7/1/2021  8:45 AM Laura Dutton PT [facility]   7/6/2021  9:30 AM [facility]   7/8/2021  9:30 AM Laura Dutton PT [place] [facility]   8/18/2021  8:30 AM Yuan Giang MD [facility]

## 2021-07-06 ENCOUNTER — HOSPITAL ENCOUNTER (OUTPATIENT)
Dept: PHYSICAL THERAPY | Age: 86
Discharge: HOME | End: 2021-07-06
Payer: MEDICARE

## 2021-07-06 PROCEDURE — 97530 THERAPEUTIC ACTIVITIES: CPT | Performed by: PHYSICAL THERAPIST

## 2021-07-06 PROCEDURE — 97140 MANUAL THERAPY 1/> REGIONS: CPT | Performed by: PHYSICAL THERAPIST

## 2021-07-06 PROCEDURE — 97110 THERAPEUTIC EXERCISES: CPT | Performed by: PHYSICAL THERAPIST

## 2021-07-06 NOTE — PROGRESS NOTES
DEVON Keller [address] THERAPY   [facility] [address],[place], [facility] - Phone: [phone]  Fax: [phone] OF CARE/RECERTIFICATION FOR PHYSICAL THERAPY          Patient Name: Daphnie Kelley : 1935   Treatment/Medical Diagnosis: Low back pain [M54.5]   Onset Date: chronic    Referral Source: Holly Bond MD Start of Care [facility]): 21   Prior Hospitalization: See Medical History Provider #: [id number]   Prior Level of Function: I with activities, able to ambulate >5 miles   Comorbidities: Latex allergy, HTN, DM, scolisos   Medications: Verified on Patient Summary List   Visits from [facility]: 7 Missed Visits: 0     Goal/Measure of Progress Goal Met? 1. Pt will ambulate >/=10 blocks without lbp >/=4/10 in order to improve walking regiment    Status at last Eval: 5/10 Current Status: 2/10 yes   2. Pt will demonstrate 30\" SLS on each LE in order to decrease risk of falls    Status at last Eval: R 14, L 8 Current Status: R 33, L 23 progressing   3. Improve 5 sit<>stand test from 19\" surface <12\" in order to improve functional transfers and decrease fall risk   Status at last Eval: 15sec Current Status: 10 sec yes     Key Functional Changes/Progress: pt has made excellent progress with PT thus far. Improvements include reduced pain, increased wb tolerance both standing and walking and improved transfers (sit<>stand). She still reports difficulty with lifting, carrying and twisting activities along R lumbosacral junction. She has limited posterior ilial rotation/hip flexion and thoracic rotation contributing to this. glute strength is also an issue.  Would like to continue with therapy an additional month to address these deficits with anticipation of d/c at that time  Problem List: pain affecting function, decrease ROM, decrease strength, impaired gait/ balance, decrease ADL/ functional abilitiies, decrease activity tolerance, decrease flexibility/ joint mobility and decrease transfer abilities   Treatment Plan may include any combination of the following: Therapeutic exercise, Therapeutic activities, Neuromuscular re-education, Physical agent/modality, Gait/balance training, Manual therapy, Patient education, Self Care training, Functional mobility training and Home safety training  Patient Goal(s) has been updated and includes:      Goals for this certification period include and are to be achieved in   4  weeks:  1. Achieve goal #2  2. Increase FOTO >/=62/100 in order to show functional improvement  3. I with advanced hep in order to prepare for d/c   Frequency / Duration:   Patient to be seen   2   times per week for   4    weeks:    Assessments/Recommendations: continue therapy an additional month to address rotational stability and strength  If you have any questions/comments please contact us directly at [phone]. Thank you for allowing us to assist in the care of your patient. Therapist Signature: Mary Ferreira PT Date: 6/4/8048   Certification Period:  Reporting Period: 6/9/21-9/7/21 6/9/21-7/6/21 Time: 7:50 AM   NOTE TO PHYSICIAN:  PLEASE COMPLETE THE ORDERS BELOW AND FAX TO   [facility] Physical Therapy: [phone]  If you are unable to process this request in 24 hours please contact our office: [phone]    ___ I have read the above report and request that my patient continue as recommended.   ___ I have read the above report and request that my patient continue therapy with the following changes/special instructions: ________________________________________________   ___ I have read the above report and request that my patient be discharged from therapy.      Physician Signature:        Date:       Time:                                       Amena Delong MD

## 2021-07-06 NOTE — PROGRESS NOTES
[facility] PHYSICAL THERAPY - DAILY TREATMENT NOTE    Patient Name: Courtney Rhoades        Date: 2021  : 1935   yes Patient  Verified  Visit #:   7   of   10  Insurance: Payor: Marcelo Mackey / Plan: [place] MEDICARE PART A & B / Product Type: Medicare /      In time: 930 Out time: 2498   Total Treatment Time: 45     Medicare/BCBS Time Tracking (below)   Total Timed Codes (min):  45 1:1 Treatment Time:  45     TREATMENT AREA =  Low back pain [M54.5]    SUBJECTIVE  Pain Level (on 0 to 10 scale):  0  / 10   Medication Changes/New allergies or changes in medical history, any new surgeries or procedures?    no  If yes, update Summary List   Subjective Functional Status/Changes:  []  No changes reported     See pn       OBJECTIVE    20 min Therapeutic Exercise:  [x]  See flow sheet   Rationale:      increase ROM, increase strength, improve coordination, improve balance and increase proprioception to improve the patients ability to complete adls     10 min Manual Therapy: stm ls paraspinals, R glutes, posterior ilial rotation    Rationale:      decrease pain, increase ROM, increase tissue extensibility and decrease trigger points to improve patient's ability to squat  The manual therapy interventions were performed at a separate and distinct time from the therapeutic activities interventions.     10 min Therapeutic Activity: [x]  See flow sheet   Rationale:    increase strength and improve coordination to improve the patients ability to squat    5 min Neuromuscular Re-ed: [x]  See flow sheet   Rationale:    improve coordination, improve balance and increase proprioception to improve the patients ability to transfer safely         Billed With/As:   [] TE   [] TA   [] Neuro   [] Self Care Patient Education: [x] Review HEP    [] Progressed/Changed HEP based on:   [] positioning   [] body mechanics   [] transfers   [] heat/ice application    [] other:      Other Objective/Functional Measures:  See pn   Post Treatment Pain Level (on 0 to 10) scale:   0  / 10     ASSESSMENT  Assessment/Changes in Function:     See pn     []  See Progress Note/Recertification   Patient will continue to benefit from skilled PT services to modify and progress therapeutic interventions, address functional mobility deficits, address ROM deficits, address strength deficits, analyze and address soft tissue restrictions, analyze and cue movement patterns, analyze and modify body mechanics/ergonomics, assess and modify postural abnormalities and instruct in home and community integration to attain remaining goals.    Progress toward goals / Updated goals:    See pn     PLAN  []  Upgrade activities as tolerated yes Continue plan of care   []  Discharge due to :    []  Other:      Therapist: Yanni Castillo, PT    Date: 7/6/2021 Time: 8:06 AM     Future Appointments   Date Time Provider Kelin Wilkins   7/6/2021  9:30 AM Rosalind Bautista,  [address] [facility]   7/16/2021 10:15 AM Nancy Yadav,  [address] [facility]   7/20/2021  8:30 AM Sotojames Lopez [address] [facility]   7/22/2021  8:45 AM Rosalind Bautista,  [address] [facility]   7/27/2021  9:45 AM Soto Jonh [address] [facility]   7/29/2021 10:15 AM Rosalind Bautista PT [facility]   8/18/2021  8:30 AM Kristina Giang MD [facility]

## 2021-07-08 ENCOUNTER — APPOINTMENT (OUTPATIENT)
Dept: PHYSICAL THERAPY | Age: 86
End: 2021-07-08
Payer: MEDICARE

## 2021-07-15 ENCOUNTER — TELEPHONE (OUTPATIENT)
Dept: FAMILY MEDICINE CLINIC | Age: 86
End: 2021-07-15

## 2021-07-15 NOTE — TELEPHONE ENCOUNTER
I called to ask the provider is she has selected a new PCP and she informed me that she is now seeing Jannet Miguel MD.

## 2021-07-16 ENCOUNTER — HOSPITAL ENCOUNTER (OUTPATIENT)
Dept: PHYSICAL THERAPY | Age: 86
Discharge: HOME | End: 2021-07-16
Payer: MEDICARE

## 2021-07-16 PROCEDURE — 97110 THERAPEUTIC EXERCISES: CPT

## 2021-07-16 NOTE — PROGRESS NOTES
PHYSICAL THERAPY - DAILY TREATMENT NOTE    Patient Name: Shayne Luis        Date: 2021  : 1935   yes Patient  Verified  Visit #:     Insurance: Payor: Tara Rockwell / Plan: [place] MEDICARE PART A & B / Product Type: Medicare /      In time: 3682 Out time: 11   Total Treatment Time: 40     Medicare/BCBS Time Tracking (below)   Total Timed Codes (min):  40 1:1 Treatment Time:  40     TREATMENT AREA =  Low back pain [M54.5]    SUBJECTIVE  Pain Level (on 0 to 10 scale):  0  / 10   Medication Changes/New allergies or changes in medical history, any new surgeries or procedures?    no  If yes, update Summary List   Subjective Functional Status/Changes:  []  No changes reported     No new complaints. OBJECTIVE  40 min Therapeutic Exercise:  [x]  See flow sheet   Rationale:      increase ROM, increase strength, improve coordination and improve balance to improve the patients ability to perform pain free ADLs. Billed With/As:   [x] TE   [] TA   [] Neuro   [] Self Care Patient Education: [x] Review HEP    [] Progressed/Changed HEP based on:   [] positioning   [] body mechanics   [] transfers   [] heat/ice application    [] other:      Other Objective/Functional Measures: Therex per flow sheet. Post Treatment Pain Level (on 0 to 10) scale:   0   / 10     ASSESSMENT  Assessment/Changes in Function:     No exacerbation of symptoms with today's session. []  See Progress Note/Recertification   Patient will continue to benefit from skilled PT services to modify and progress therapeutic interventions, address functional mobility deficits, address ROM deficits, address strength deficits, analyze and address soft tissue restrictions, analyze and cue movement patterns, analyze and modify body mechanics/ergonomics and assess and modify postural abnormalities to attain remaining goals.    Progress toward goals / Updated goals:    No change in progress toward LTG's with today's session.       PLAN  [x]  Upgrade activities as tolerated yes Continue plan of care   []  Discharge due to :    []  Other:      Therapist: Alanis Garrison PTA    Date: 7/16/2021 Time: 1:42 PM     Future Appointments   Date Time Provider Kelin Wilkins   7/20/2021  8:30 AM Katie [facility]   7/22/2021  8:45 AM Gloria Henao PT  [facility]   7/27/2021  9:45 AM Katie Hurtado  [facility]   7/29/2021 10:15 AM Gloria Henao PT [facility]   8/18/2021  8:30 AM Huang Giang MD [facility]

## 2021-07-20 ENCOUNTER — HOSPITAL ENCOUNTER (OUTPATIENT)
Dept: PHYSICAL THERAPY | Age: 86
Discharge: HOME | End: 2021-07-20
Payer: MEDICARE

## 2021-07-20 PROCEDURE — 97110 THERAPEUTIC EXERCISES: CPT

## 2021-07-20 NOTE — PROGRESS NOTES
PHYSICAL THERAPY - DAILY TREATMENT NOTE    Patient Name: Shayne Luis        Date: 2021  : 1935   yes Patient  Verified  Visit #:     Insurance: Payor: Tara Rockwell / Plan: [place] MEDICARE PART A & B / Product Type: Medicare /      In time: 825 Out time: 910   Total Treatment Time: 40     Medicare/[facility] Time Tracking (below)   Total Timed Codes (min):  40 1:1 Treatment Time:  40     TREATMENT AREA =  Low back pain [M54.5]    SUBJECTIVE  Pain Level (on 0 to 10 scale):  0  / 10   Medication Changes/New allergies or changes in medical history, any new surgeries or procedures?    no  If yes, update Summary List   Subjective Functional Status/Changes:  []  No changes reported     No new complaints. OBJECTIVE  40 min Therapeutic Exercise:  [x]  See flow sheet   Rationale:      increase ROM, increase strength, improve coordination and improve balance to improve the patients ability to perform pain free ADLs. Billed With/As:   [x] TE   [] TA   [] Neuro   [] Self Care Patient Education: [x] Review HEP    [] Progressed/Changed HEP based on:   [] positioning   [] body mechanics   [] transfers   [] heat/ice application    [] other:      Other Objective/Functional Measures: Therex per flow sheet. Post Treatment Pain Level (on 0 to 10) scale:   0  / 10     ASSESSMENT  Assessment/Changes in Function:     No exacerbation of symptoms with today's session. []  See Progress Note/Recertification   Patient will continue to benefit from skilled PT services to modify and progress therapeutic interventions, address functional mobility deficits, address ROM deficits, address strength deficits, analyze and address soft tissue restrictions, analyze and cue movement patterns, analyze and modify body mechanics/ergonomics and assess and modify postural abnormalities to attain remaining goals. Progress toward goals / Updated goals:    Perform pain free ADLs.        PLAN  [x]  Upgrade activities as tolerated yes Continue plan of care   []  Discharge due to :    []  Other:      Therapist: Zander Escamilla PTA    Date: 7/20/2021 Time: 8:42 AM     Future Appointments   Date Time Provider Kelin Wilkins   7/22/2021  8:45 AM Lilliana Carranza PT [facility]   7/27/2021  9:45 AM Elicia [id number]   7/29/2021 10:15 AM Lilliana Carranza PT [facility]   8/18/2021  8:30 AM Reece Giang MD [facility]

## 2021-07-22 ENCOUNTER — HOSPITAL ENCOUNTER (OUTPATIENT)
Dept: PHYSICAL THERAPY | Age: 86
Discharge: HOME | End: 2021-07-22
Payer: MEDICARE

## 2021-07-22 PROCEDURE — 97110 THERAPEUTIC EXERCISES: CPT | Performed by: PHYSICAL THERAPIST

## 2021-07-22 NOTE — PROGRESS NOTES
[facility]   PHYSICAL THERAPY - DAILY TREATMENT NOTE    Patient Name: Kasie Walter        Date: 2021  : 1935   yes Patient  Verified  Visit #:   10   of   12  Insurance: Payor: Raymond Raza / Plan: [place] MEDICARE PART A & B / Product Type: Medicare /      In time: 373 Out time: 930   Total Treatment Time: 45     Medicare/BCBS Time Tracking (below)   Total Timed Codes (min):  45 1:1 Treatment Time:  45     TREATMENT AREA =  Low back pain [M54.5]    SUBJECTIVE  Pain Level (on 0 to 10 scale):  0  / 10   Medication Changes/New allergies or changes in medical history, any new surgeries or procedures?    no  If yes, update Summary List   Subjective Functional Status/Changes:  []  No changes reported     The only thing I really cannot do is stay stay completely upright when I walk for a period of time           OBJECTIVE    45 min Therapeutic Exercise:  [x]  See flow sheet   Rationale:      increase ROM, increase strength, improve coordination, improve balance and increase proprioception to improve the patients ability to squat/ambulate community distances          Billed With/As:   [] TE   [] TA   [] Neuro   [] Self Care Patient Education: [x] Review HEP    [] Progressed/Changed HEP based on:   [] positioning   [] body mechanics   [] transfers   [] heat/ice application    [] other:      Other Objective/Functional Measures:    Progressed te as per flow sheet to include upper back stability/strength - required cues for pallof press for neutral spine and soft knees Held on mt due to no pain     Post Treatment Pain Level (on 0 to 10) scale:   0  / 10     ASSESSMENT  Assessment/Changes in Function:     Fatigues at end of session but no pain in back or hip area      []  See Progress Note/Recertification   Patient will continue to benefit from skilled PT services to modify and progress therapeutic interventions, address functional mobility deficits, address ROM deficits, address strength deficits, analyze and address soft tissue restrictions, analyze and cue movement patterns, analyze and modify body mechanics/ergonomics, assess and modify postural abnormalities and instruct in home and community integration to attain remaining goals.    Progress toward goals / Updated goals:    Anticipate d/c next two sessions due ot maximum gains with PT achieved     PLAN  []  Upgrade activities as tolerated yes Continue plan of care   []  Discharge due to :    []  Other:      Therapist: Guero Moran PT    Date: 7/22/2021 Time: 9:00 AM     Future Appointments   Date Time Provider Kelin Wilkins   7/27/2021  9:45 AM Dmitriy Delaney [facility] [address]   7/29/2021 10:15 AM Jackie Roland PT [place] [address] [address]   8/18/2021  8:30 AM Anna Marie Giang MD [facility]

## 2021-07-27 ENCOUNTER — HOSPITAL ENCOUNTER (OUTPATIENT)
Dept: PHYSICAL THERAPY | Age: 86
Discharge: HOME | End: 2021-07-27
Payer: MEDICARE

## 2021-07-27 PROCEDURE — 97110 THERAPEUTIC EXERCISES: CPT

## 2021-07-27 NOTE — PROGRESS NOTES
PHYSICAL THERAPY - DAILY TREATMENT NOTE    Patient Name: Yomaira Juan        Date: 2021  : 1935   yes Patient  Verified  Visit #:     Insurance: Payor: Daniel Prietoreymundo / Plan: [place] MEDICARE PART A & B / Product Type: Medicare /      In time: 384 Out time: 1030   Total Treatment Time: 40     Medicare/BCBS Time Tracking (below)   Total Timed Codes (min):  40 1:1 Treatment Time:  40     TREATMENT AREA =  Low back pain [M54.5]    SUBJECTIVE  Pain Level (on 0 to 10 scale):  0  / 10   Medication Changes/New allergies or changes in medical history, any new surgeries or procedures?    no  If yes, update Summary List   Subjective Functional Status/Changes:  []  No changes reported     No new complaints. OBJECTIVE  40 min Therapeutic Exercise:  [x]  See flow sheet   Rationale:      increase ROM and increase strength to improve the patients ability to perform pain free ADLs. Billed With/As:   [x] TE   [] TA   [] Neuro   [] Self Care Patient Education: [x] Review HEP    [] Progressed/Changed HEP based on:   [] positioning   [] body mechanics   [] transfers   [] heat/ice application    [] other:      Other Objective/Functional Measures: Therex per flow sheet. Post Treatment Pain Level (on 0 to 10) scale:   0  / 10     ASSESSMENT  Assessment/Changes in Function:     No exacerbation of symptoms with today's session. []  See Progress Note/Recertification   Patient will continue to benefit from skilled PT services to modify and progress therapeutic interventions, address functional mobility deficits, address ROM deficits, address strength deficits, analyze and address soft tissue restrictions, analyze and cue movement patterns, analyze and modify body mechanics/ergonomics and assess and modify postural abnormalities to attain remaining goals. Progress toward goals / Updated goals:    No change in progress toward LTG's with today's session.       PLAN  [x]  Upgrade activities as tolerated yes Continue plan of care   []  Discharge due to :    []  Other:      Therapist: Zion Cedillo PTA    Date: 7/27/2021 Time: 1:17 PM     Future Appointments   Date Time Provider Kelin Wilkins   7/29/2021 10:15 AM Brenna Patel, MARY [address]

## 2021-07-29 ENCOUNTER — HOSPITAL ENCOUNTER (OUTPATIENT)
Dept: PHYSICAL THERAPY | Age: 86
Discharge: HOME | End: 2021-07-29
Payer: MEDICARE

## 2021-07-29 PROCEDURE — 97110 THERAPEUTIC EXERCISES: CPT | Performed by: PHYSICAL THERAPIST

## 2021-07-29 NOTE — PROGRESS NOTES
. [facility] PHYSICAL THERAPY   [facility] [address],[place], [address] [facility] - Phone: [phone]  Fax: [phone] SUMMARY FOR PHYSICAL THERAPY          Patient Name: Mc Garcia : 1935   Treatment/Medical Diagnosis: Low back pain [M54.5]   Onset Date: chronic    Referral Source: Evonne Morales Start of Care [facility]): 21   Prior Hospitalization: See Medical History Provider #: [id number]   Prior Level of Function: I with activities    Comorbidities: Latex allergy, htn, dm, scoliosis   Medications: Verified on Patient Summary List   Visits from [facility]: 12 Missed Visits: 0     Goal/Measure of Progress Goal Met? 1. Demonstrate 30\" sls on each LE in order to decrease fall risks   Status at last Eval: r 33, l 23 Current Status: b >45\" yes   2. Increase FOTO >/-62/100 in order to show functional improvement   Status at last Eval: 52/100 Current Status: 63/100 yes   3. I with advanced hep in order to prepare for dc   Status at last Eval: na Current Status:  yes     Key Functional Changes/Progress: pt has achieved all goals and is appropriate for dc    Assessments/Recommendations: Discontinue therapy. Progressing towards or have reached established goals. If you have any questions/comments please contact us directly at [phone]. Thank you for allowing us to assist in the care of your patient.     Therapist Signature: Mauro Levi PT Date: 21   Reporting Period: 21-21 Time: 11:53 AM

## 2021-07-29 NOTE — PROGRESS NOTES
[facility] PHYSICAL THERAPY - DAILY TREATMENT NOTE    Patient Name: Kasie Walter        Date: 2021  : 1935   yes Patient  Verified  Visit #:     Insurance: Payor: Raymond Gust / Plan: [place] MEDICARE PART A & B / Product Type: Medicare /      In time: 2844 Out time: 1100   Total Treatment Time: 40     Medicare/BCBS Time Tracking (below)   Total Timed Codes (min):  40 1:1 Treatment Time:  40     TREATMENT AREA =  Low back pain [M54.5]    SUBJECTIVE  Pain Level (on 0 to 10 scale):  0  / 10   Medication Changes/New allergies or changes in medical history, any new surgeries or procedures?    no  If yes, update Summary List   Subjective Functional Status/Changes:  []  No changes reported     See dc       OBJECTIVE    40 min Therapeutic Exercise:  [x]  See flow sheet   Rationale:      increase ROM, increase strength, improve coordination, improve balance and increase proprioception to improve the patients ability to squat/ambulate community distances          Billed With/As:   [] TE   [] TA   [] Neuro   [] Self Care Patient Education: [x] Review HEP    [] Progressed/Changed HEP based on:   [] positioning   [] body mechanics   [] transfers   [] heat/ice application    [] other:      Other Objective/Functional Measures:    See dc     Post Treatment Pain Level (on 0 to 10) scale:   0  / 10     ASSESSMENT  Assessment/Changes in Function:     See dc     []  See Progress Note/Recertification   Patient will continue to benefit from skilled PT services to see dc   Progress toward goals / Updated goals:  See dc     PLAN  []  Upgrade activities as tolerated no Continue plan of care   []  Discharge due to : Goals achieved    []  Other:      Therapist: Aung Escobar PT    Date: 2021 Time: 9:00 AM     No future appointments.

## 2022-03-19 PROBLEM — E04.1 THYROID NODULE: Status: ACTIVE | Noted: 2019-03-04

## 2022-11-17 ENCOUNTER — HOSPITAL ENCOUNTER (OUTPATIENT)
Dept: PHYSICAL THERAPY | Age: 87
Discharge: HOME | End: 2022-11-17
Payer: MEDICARE

## 2022-11-17 PROCEDURE — 97110 THERAPEUTIC EXERCISES: CPT

## 2022-11-17 PROCEDURE — 97162 PT EVAL MOD COMPLEX 30 MIN: CPT

## 2022-11-17 NOTE — THERAPY EVALUATION
[address] [place] [address], [facility] - Phone: [phone]  Fax: [phone] / 4237 [facility]  Patient Name: Mally Esparza : 1935   Medical   Diagnosis: Other low back pain [M54.59] Treatment Diagnosis: Other low back pain [M54.59]   Onset Date: Chronic      Referral Source: Saravanan Miller, * Start of Care [facility]): 2022   Prior Hospitalization: See medical history Provider #: [id number]   Prior Level of Function: I in all activities; able to ambulate > 5 miles   Comorbidities: Latex allergy, HTN, DM, scoliosis    Medications: Verified on Patient Summary List   The Plan of Care and following information is based on the information from the initial evaluation.   ===========================================================================================  Assessment / key information: Patient is a 81 y.o. female who presents to [facility] at [facility] [place]. with diagnosis of Other low back pain [M54.59]. Patient reports chronic h/o LBP and ZENAIDA Knee pain. Known h/o scoliosis. Prior participation in PT services at [facility]. for management of LBP. H/O imaging of  ZENAIDA knees - notes right knee is worse than left. Back and ZENAIDA knee pain is described as intermittent fatigue and stiffness and located on the posterior aspect of the left glute. Reports ZENAIDA knee ache with ambulating household distances and intermittent R>L Knee buckling with standing. Pt denies numbness and tingling in ZENAIDA LE, however, notes intermittent pain along the lateral aspect of ZENAIDA shins (when in bed). Pain level is rated at 0/10 at the best, 8/10 currently, and 8/10 at the worst. LBP and ZENAIDA Knee pain increases with navigating stairs, prolonged standing, transferring from sit to stand, transfers into SUV, and ambulation (Household distances).   Upon objective evaluation, patient demonstrates impaired and painful trunk AROM in L SB (Flexion 100% - to toes, Ext 65%, R/L SB 85/65% pain to the left, and R/L Rotation 75/100%), R/L knee ROM WNLs (R/L Knee AROM = 0/+1 to 128/129 deg), R knee crepitus, intact ZENAIDA LE L2-S1 light touch sensation, decreased core strength (supine bridge = 75%), upper L/S CPA joint hypomobility, and impaired flexibility of left piriformis and ZENAIDA hamstring (ZENAIDA Ham 90/90 = 158 deg) musculature. Transfers from sit to stand modified independently with increased trunk and use of ZENAIDA UEs. (-) Slump/SLR special testing. All screening red flags and review of systems were cleared and negative. Patient scored 57/100 on FOTO indicating decreased functional status and quality of life. Patient can benefit from skilled PT interventions to improve L/S ROM, flexibility, core strength, decrease pain and TTP and for education on posture, body mechanics and lifting mechanics/transfers to facilitate ADL's & overall functional status/quality of life. L(0-5) R (0-5)   Psoas (L1,2) 4 4+   Quadriceps (L3,4) 4+ 4+   Ant Tibialis (L4) 4 4   Gluteus Medius (L5) 3 3+   Hamstring (S1,2) 4+ 4+   Gluteus Dave (S1, S2) 3+ 3+   ===========================================================================================  Eval Complexity: History HIGH Complexity :3+ comorbidities / personal factors will impact the outcome/ POC ;  Examination  HIGH Complexity : 4+ Standardized tests and measures addressing body structure, function, activity limitation and / or participation in recreation ; Presentation MEDIUM Complexity : Evolving with changing characteristics ;   Decision Making MEDIUM Complexity : FOTO score of 26-74; Overall Complexity MEDIUM  Problem List: pain affecting function, decrease ROM, decrease strength, edema affecting function, impaired gait/ balance, decrease ADL/ functional abilities, decrease activity tolerance, decrease flexibility/ joint mobility and decrease transfer abilities   Treatment Plan may include any combination of the following: Therapeutic exercise, Therapeutic activities, Neuromuscular re-education, Physical agent/modality, Gait/balance training, Manual therapy, Patient education, Self Care training, Functional mobility training, Home safety training and Stair training  Patient / Family readiness to learn indicated by: asking questions, trying to perform skills and interest  Persons(s) to be included in education: patient (P)  Barriers to Learning/Limitations: None  Measures taken, if barriers to learning:    Patient Goal (s): \"Strengthen knees and make it better\"   Patient self reported health status: fair  Rehabilitation Potential: good  Short Term Goals: To be accomplished in  2  weeks:  1) Establish HEP. 2) Patient will report decreased c/o pain to < or = 6/10 at the worst to facilitate prolonged standing ADL's with manageable sx in L/S and ZENAIDA knees  Long Term Goals: To be accomplished in  6  weeks:  1) Patient to be independent & compliant with a progressive, high level HEP in order to maintain gains made in physical therapy. 2) Patient will report decreased c/o pain to < or = 5/10 at the worst to facilitate prolonged standing ADL's with manageable sx in L/S and ZENAIDA knees. 3) Increase FOTO to 67/100 indicating improved function and quality of life. 4) Patient to perform  bridge 10x10\" indicating improved core strength to improve ambulation around the grocery store.    Frequency / Duration:   Patient to be seen  2  times per week for 6  weeks:  Patient / Caregiver education and instruction: self care, activity modification, brace/ splint application and exercises  Therapist Signature: Tanisha Macias Date: 21/16/8984   Certification Period: 11/17/2022 to 2/16/2023 Time: 8:49 AM   ===========================================================================================  I certify that the above Physical Therapy Services are being furnished while the patient is under my care. I agree with the treatment plan and certify that this therapy is necessary. Physician Signature:        Date:       Time:                                          Evgeny Singleton, *  Please sign and return to [facility] at [facility], [facility]. or you may fax the signed copy to [phone]. Thank you.

## 2022-11-17 NOTE — PROGRESS NOTES
PHYSICAL THERAPY - DAILY TREATMENT NOTE    Patient Name: Rojelio Lopez        Date: 2022  : 1935   Yes Patient  Verified  Visit #:     Insurance: Payor: Antonio Trinidad / Plan: [place] MED[place] PART [place] & B / Product Type: Medicare /      In time: 9:20 Out time: 9:54   Total Treatment Time: 34     Medicare/BCBS Time Tracking (below)   Total Timed Codes (min):  15 1:1 Treatment Time:  34     TREATMENT AREA =  Other low back pain [M54.59]    SUBJECTIVE  Pain Level (on 0 to 10 scale):  0  / 10   Medication Changes/New allergies or changes in medical history, any new surgeries or procedures?    no  If yes, update Summary List   Subjective Functional Status/Changes:  []  No changes reported     See POC         OBJECTIVE  Modalities Rationale:     15 min Therapeutic Exercise:  [x]  See flow sheet   Rationale:      increase ROM and increase strength to improve the patients ability to tolerate prolonged standing      Billed With/As:   [x] TE   [] TA   [] Neuro   [] Self Care Patient Education: [x] Review HEP    [x] Progressed/Changed HEP based on:   [x] positioning   [x] body mechanics   [] transfers   [] heat/ice application    [] other:      Other Objective/Functional Measures:    See POC     Post Treatment Pain Level (on 0 to 10) scale:   0  / 10     ASSESSMENT  Assessment/Changes in Function:     See POC     []  See Progress Note/Recertification   Patient will continue to benefit from skilled PT services to modify and progress therapeutic interventions, address functional mobility deficits, address ROM deficits, address strength deficits, analyze and address soft tissue restrictions, analyze and cue movement patterns, analyze and modify body mechanics/ergonomics, assess and modify postural abnormalities and instruct in home and community integration to attain remaining goals.    Progress toward goals / Updated goals:    See newly established goals in POC     PLAN  [x]  Upgrade activities as tolerated yes Continue plan of care   []  Discharge due to :    []  Other:      Therapist: Latonia Hutchinson    Date: 11/17/2022 Time: 8:48 AM     Future Appointments   Date Time Provider Kelin Wilkins   11/17/2022  9:15 AM Mario Bland

## 2022-11-22 ENCOUNTER — HOSPITAL ENCOUNTER (OUTPATIENT)
Dept: PHYSICAL THERAPY | Age: 87
Discharge: HOME | End: 2022-11-22
Payer: MEDICARE

## 2022-11-22 PROCEDURE — 97110 THERAPEUTIC EXERCISES: CPT

## 2022-11-22 PROCEDURE — 97140 MANUAL THERAPY 1/> REGIONS: CPT

## 2022-11-22 NOTE — PROGRESS NOTES
PHYSICAL THERAPY - DAILY TREATMENT NOTE    Patient Name: Edel Prince        Date: 2022  : 1935   Yes Patient  Verified  Visit #:     Insurance: Payor: Noble Beets / Plan: [place] MEDICARE PART A & B / Product Type: Medicare /      In time: 10:38 Out time: 11:18   Total Treatment Time: 40     Medicare/BCBS Time Tracking (below)   Total Timed Codes (min):  40 1:1 Treatment Time: 40     TREATMENT AREA =  Other low back pain [M54.59]    SUBJECTIVE  Pain Level (on 0 to 10 scale):  0  / 10   Medication Changes/New allergies or changes in medical history, any new surgeries or procedures?    no  If yes, update Summary List   Subjective Functional Status/Changes:  []  No changes reported     Sore went to gym yesterday and did a lot of exercises             OBJECTIVE  Modalities Rationale:     30 min Therapeutic Exercise:  [x]  See flow sheet   Rationale:      increase ROM and increase strength to improve the patients ability to tolerate prolonged standing      10 min Manual Therapy: S/L STM to ZENAIDA ITB and L/s Paraspinals, ZENAIDA piriformis TrP   Rationale:      decrease pain, increase ROM, increase tissue extensibility, and decrease trigger points to improve patient's ability to perform ADLs. The manual therapy interventions were performed at a separate and distinct time from the therapeutic activities interventions.      Billed With/As:   [x] TE   [] TA   [] Neuro   [] Self Care Patient Education: [x] Review HEP    [x] Progressed/Changed HEP based on:   [x] positioning   [x] body mechanics   [] transfers   [] heat/ice application    [] other:      Other Objective/Functional Measures:    TTP and incr tone to ZENAIDA ITB  Added nu step, step ups, standing hip ABD, HR/TR, and calf stretch  Cuing with standing hip ABD for proper form   Requires use of HR with step ups     Post Treatment Pain Level (on 0 to 10) scale:   0  / 10     ASSESSMENT  Assessment/Changes in Function:     Good tolerance for progressions in treatment program as patient denies pain post session. Updated and issued HEP. []  See Progress Note/Recertification   Patient will continue to benefit from skilled PT services to modify and progress therapeutic interventions, address functional mobility deficits, address ROM deficits, address strength deficits, analyze and address soft tissue restrictions, analyze and cue movement patterns, analyze and modify body mechanics/ergonomics, assess and modify postural abnormalities and instruct in home and community integration to attain remaining goals.    Progress toward goals / Updated goals:    See newly established goals in POC     PLAN  [x]  Upgrade activities as tolerated yes Continue plan of care   []  Discharge due to :    []  Other:      Therapist: Mary Anne Vega    Date: 11/22/2022 Time: 8:48 AM     Future Appointments   Date Time Provider Kelin Wilkins   11/22/2022 10:40 AM Santo Soho [facility]   11/29/2022  7:00 AM Nakia Alvarado [address]   12/1/2022  7:00 AM Santo Soho [facility]   12/5/2022  9:40 AM Nakia Alvarado [facility]   12/7/2022  7:00 AM Nakia Alvarado [facility]   12/12/2022  9:00 AM [facility] Rakesh [address]   12/14/2022  7:00 AM Santo Soho [facility]   12/19/2022  8:20 AM Nakia Alvarado [facility]   12/21/2022  7:00 AM Nakia Alvarado [address]   12/27/2022  7:00 AM Nakia Alvarado [address]   12/28/2022  7:00 AM Nakia Alvarado [address]

## 2022-11-28 NOTE — PROGRESS NOTES
PHYSICAL THERAPY - DAILY TREATMENT NOTE    Patient Name: Temi Ham        Date: 2022  : 1935   Yes Patient  Verified  Visit #:   3  of   12  Insurance: Payor: Ramya Elmore / Plan: [place] MEDICARE PART A & B / Product Type: Medicare /      In time: 700 Out time: 7:40   Total Treatment Time: 40     Medicare/BCBS Time Tracking (below)   Total Timed Codes (min):  40 1:1 Treatment Time: 40     TREATMENT AREA =  Other low back pain [M54.59]    SUBJECTIVE  Pain Level (on 0 to 10 scale):  0   / 10   Medication Changes/New allergies or changes in medical history, any new surgeries or procedures?    no  If yes, update Summary List   Subjective Functional Status/Changes:  []  No changes reported     Denies pain today          OBJECTIVE  Modalities Rationale:     32 min Therapeutic Exercise:  [x]  See flow sheet   Rationale:      increase ROM and increase strength to improve the patients ability to tolerate prolonged standing      8 min Manual Therapy: S/L STM to ZENAIDA ITB and L/s Paraspinals, ZENAIDA piriformis TrP   Rationale:      decrease pain, increase ROM, increase tissue extensibility, and decrease trigger points to improve patient's ability to perform ADLs. The manual therapy interventions were performed at a separate and distinct time from the therapeutic activities interventions.      Billed With/As:   [x] TE   [] TA   [] Neuro   [] Self Care Patient Education: [x] Review HEP    [x] Progressed/Changed HEP based on:   [x] positioning   [x] body mechanics   [] transfers   [] heat/ice application    [] other:      Other Objective/Functional Measures:    Continued TTP and incr tone to ZENAIDA ITB  Added small robby navigation in // bars  Intermittently circumduct R LE to clear robby   Added SR balance  Able to perform step ups without     Post Treatment Pain Level (on 0 to 10) scale:   0  / 10     ASSESSMENT  Assessment/Changes in Function:     Initiated static and dynamic standing balance with good tolerance. []  See Progress Note/Recertification   Patient will continue to benefit from skilled PT services to modify and progress therapeutic interventions, address functional mobility deficits, address ROM deficits, address strength deficits, analyze and address soft tissue restrictions, analyze and cue movement patterns, analyze and modify body mechanics/ergonomics, assess and modify postural abnormalities and instruct in home and community integration to attain remaining goals. Progress toward goals / Updated goals:    1) Establish HEP.   Goal Met compliant with HEP (11/29/22)  2) Patient will report decreased c/o pain to < or = 6/10 at the worst to facilitate prolonged standing ADL's with manageable sx in L/S and ZENAIDA knees      PLAN  [x]  Upgrade activities as tolerated yes Continue plan of care   []  Discharge due to :    []  Other:      Therapist: Linnea Ravi    Date: 11/29/2022 Time: 8:48 AM     Future Appointments   Date Time Provider Kelin Wilkins   12/1/2022  7:00 AM Brian Henderson [facility]   12/5/2022  9:40 AM Myah Alvarado [facility]   12/7/2022  7:00 AM Courson, Atylor Panda [address]   12/12/2022  9:00 AM Eveline Blight [address]   12/14/2022  7:00 AM Eveline Blight [facility]   12/19/2022  8:20 AM Courson, Taylor Panda [facility]   12/21/2022  7:00 AM Courson, Taylor Panda [address]   12/27/2022  7:00 AM Courson, Taylor Panda [address]   12/28/2022  7:00 AM Courson, Taylor Panda [address]

## 2022-11-29 ENCOUNTER — HOSPITAL ENCOUNTER (OUTPATIENT)
Dept: PHYSICAL THERAPY | Age: 87
Discharge: HOME | End: 2022-11-29
Payer: MEDICARE

## 2022-11-29 PROCEDURE — 97110 THERAPEUTIC EXERCISES: CPT

## 2022-11-29 PROCEDURE — 97140 MANUAL THERAPY 1/> REGIONS: CPT

## 2022-12-01 ENCOUNTER — HOSPITAL ENCOUNTER (OUTPATIENT)
Dept: PHYSICAL THERAPY | Age: 87
Discharge: HOME | End: 2022-12-01
Payer: MEDICARE

## 2022-12-01 PROCEDURE — 97140 MANUAL THERAPY 1/> REGIONS: CPT

## 2022-12-01 PROCEDURE — 97110 THERAPEUTIC EXERCISES: CPT

## 2022-12-01 NOTE — PROGRESS NOTES
PHYSICAL THERAPY - DAILY TREATMENT NOTE    Patient Name: Herson Arellano        Date: 2022  : 1935   Yes Patient  Verified  Visit #:     Insurance: Payor: Polina Macdonald / Plan: [place] MEDICARE PART A & B / Product Type: Medicare /      In time: 7:01 Out time: 7:40   Total Treatment Time: 39     Medicare/BCBS Time Tracking (below)   Total Timed Codes (min):  39 1:1 Treatment Time: 39     TREATMENT AREA =  Other low back pain [M54.59]    SUBJECTIVE  Pain Level (on 0 to 10 scale):  0  / 10   Medication Changes/New allergies or changes in medical history, any new surgeries or procedures?    no  If yes, update Summary List   Subjective Functional Status/Changes:  []  No changes reported     No pain unless im doing something            OBJECTIVE  Modalities Rationale:     31 min Therapeutic Exercise:  [x]  See flow sheet   Rationale:      increase ROM and increase strength to improve the patients ability to tolerate prolonged standing      8 min Manual Therapy: S/L STM to ZENAIDA ITB and L/s Paraspinals, ZENAIDA piriformis TrP   Rationale:      decrease pain, increase ROM, increase tissue extensibility, and decrease trigger points to improve patient's ability to perform ADLs. The manual therapy interventions were performed at a separate and distinct time from the therapeutic activities interventions. Billed With/As:   [x] TE   [] TA   [] Neuro   [] Self Care Patient Education: [x] Review HEP    [x] Progressed/Changed HEP based on:   [x] positioning   [x] body mechanics   [] transfers   [] heat/ice application    [] other:      Other Objective/Functional Measures:    Progressed SR to SLS - with marked challenge maintaining balance on L LE  Progressed step up to 6\" - required use of HR for R LE step up     Post Treatment Pain Level (on 0 to 10) scale:   0  / 10     ASSESSMENT  Assessment/Changes in Function:     Progressed treatment as appropriate with good tolerance.       []  See Progress Note/Recertification   Patient will continue to benefit from skilled PT services to modify and progress therapeutic interventions, address functional mobility deficits, address ROM deficits, address strength deficits, analyze and address soft tissue restrictions, analyze and cue movement patterns, analyze and modify body mechanics/ergonomics, assess and modify postural abnormalities and instruct in home and community integration to attain remaining goals. Progress toward goals / Updated goals:    1) Establish HEP.   Goal Met compliant with HEP (11/29/22)  2) Patient will report decreased c/o pain to < or = 6/10 at the worst to facilitate prolonged standing ADL's with manageable sx in L/S and ZENAIDA knees      PLAN  [x]  Upgrade activities as tolerated yes Continue plan of care   []  Discharge due to :    []  Other:      Therapist: Mark Pate    Date: 12/1/2022 Time: 8:48 AM     Future Appointments   Date Time Provider Kelin Wilkins   12/5/2022  9:40 [facility]   12/7/2022  7:00 AM Jenny [place] Danie [address]   12/12/2022  9:00 AM Winigan Second [address]   12/14/2022  7:00 AM Winigan Second [facility]   12/19/2022  8:20 AM Jenny [place] Danie [facility]   12/21/2022  7:00 AM Jenny [place]ie [address]   12/27/2022  7:00 AM Jenny [place] [address]   12/28/2022  7:00 AM Arton [place] [address]

## 2022-12-05 ENCOUNTER — HOSPITAL ENCOUNTER (OUTPATIENT)
Dept: PHYSICAL THERAPY | Age: 87
Discharge: HOME | End: 2022-12-05
Payer: MEDICARE

## 2022-12-05 PROCEDURE — 97110 THERAPEUTIC EXERCISES: CPT

## 2022-12-05 NOTE — PROGRESS NOTES
PHYSICAL THERAPY - DAILY TREATMENT NOTE    Patient Name: Patricia Richter        Date: 2022  : 1935   Yes Patient  Verified  Visit #:   5of   12  Insurance: Payor: Sheridan Okeefe / Plan: [place] MEDICARE PART A & B / Product Type: Medicare /      In time: 9:42 Out time: 1020   Total Treatment Time: 38     Medicare/BCBS Time Tracking (below)   Total Timed Codes (min):  38 1:1 Treatment Time: 38     TREATMENT AREA =  Other low back pain [M54.59]    SUBJECTIVE  Pain Level (on 0 to 10 scale):  0  / 10   Medication Changes/New allergies or changes in medical history, any new surgeries or procedures?    no  If yes, update Summary List   Subjective Functional Status/Changes:  []  No changes reported      No pain today - I didn't do anything I was focused on xmas            OBJECTIVE  Modalities Rationale:     38 min Therapeutic Exercise:  [x]  See flow sheet   Rationale:      increase ROM and increase strength to improve the patients ability to tolerate prolonged standing      NT min Manual Therapy: S/L STM to ZENAIDA ITB and L/s Paraspinals, ZENAIDA piriformis TrP   Rationale:      decrease pain, increase ROM, increase tissue extensibility, and decrease trigger points to improve patient's ability to perform ADLs. The manual therapy interventions were performed at a separate and distinct time from the therapeutic activities interventions.      Billed With/As:   [x] TE   [] TA   [] Neuro   [] Self Care Patient Education: [x] Review HEP    [x] Progressed/Changed HEP based on:   [x] positioning   [x] body mechanics   [] transfers   [] heat/ice application    [] other:      Other Objective/Functional Measures:    Encouraged fully standing with sit to stand for max glute engagement   Intermittently doesn't fully clear small hurdles   Patient self progressed robby navifation to reciprocal without UEs  and SBA  Requires intermittent use of UE taps with SL balance  Able to perform step ups without UEs for assist     Post Treatment Pain Level (on 0 to 10) scale:   0  / 10     ASSESSMENT  Assessment/Changes in Function:     Patient is making good progress with core, hip and knee strengthening and denies pain at this time      []  See Progress Note/Recertification   Patient will continue to benefit from skilled PT services to modify and progress therapeutic interventions, address functional mobility deficits, address ROM deficits, address strength deficits, analyze and address soft tissue restrictions, analyze and cue movement patterns, analyze and modify body mechanics/ergonomics, assess and modify postural abnormalities and instruct in home and community integration to attain remaining goals. Progress toward goals / Updated goals:    1) Establish HEP.   Goal Met compliant with HEP (11/29/22)  2) Patient will report decreased c/o pain to < or = 6/10 at the worst to facilitate prolonged standing ADL's with manageable sx in L/S and ZENAIDA knees      PLAN  [x]  Upgrade activities as tolerated yes Continue plan of care   []  Discharge due to :    []  Other:      Therapist: Michael Fernández    Date: 12/5/2022 Time: 8:48 AM     Future Appointments   Date Time Provider Kelin Wilkins   12/5/2022  9:40 AM Oralia MEDRANO [facility]   12/7/2022  7:00 AM Carlos Alvarado [address]   12/12/2022  9:00 AM Oralia Camerno [address]   12/14/2022  7:00 AM Oralia [facility]   12/19/2022  8:20 AM Carlos Alvarado [facility]   12/21/2022  7:00 AM Carlos [address]vara[address]   12/27/2022  7:00 AM Carlos Alvarado [address]   12/28/2022  7:00 AM Carlos Alvarado [address]

## 2022-12-07 ENCOUNTER — HOSPITAL ENCOUNTER (OUTPATIENT)
Dept: PHYSICAL THERAPY | Age: 87
Discharge: HOME | End: 2022-12-07
Payer: MEDICARE

## 2022-12-07 PROCEDURE — 97110 THERAPEUTIC EXERCISES: CPT

## 2022-12-07 NOTE — PROGRESS NOTES
PHYSICAL THERAPY - DAILY TREATMENT NOTE    Patient Name: Len Kirk        Date: 2022  : 1935   Yes Patient  Verified  Visit #:    Insurance: Payor: Camilo Pineda / Plan: [place] MED[place] PART A & B / Product Type: Medicare /      In time: 6:58 Out time: 7:41   Total Treatment Time: 43     Medicare/BCBS Time Tracking (below)   Total Timed Codes (min):  43 1:1 Treatment Time: 43     TREATMENT AREA =  Other low back pain [M54.59]    SUBJECTIVE  Pain Level (on 0 to 10 scale):  0  / 10   Medication Changes/New allergies or changes in medical history, any new surgeries or procedures?    no  If yes, update Summary List   Subjective Functional Status/Changes:  []  No changes reported     Notes working on balance while dressing            OBJECTIVE  Modalities Rationale:     43 min Therapeutic Exercise:  [x]  See flow sheet   Rationale:      increase ROM and increase strength to improve the patients ability to tolerate prolonged standing      NT min Manual Therapy: S/L STM to ZENAIDA ITB and L/s Paraspinals, ZENAIDA piriformis TrP   Rationale:      decrease pain, increase ROM, increase tissue extensibility, and decrease trigger points to improve patient's ability to perform ADLs. The manual therapy interventions were performed at a separate and distinct time from the therapeutic activities interventions.      Billed With/As:   [x] TE   [] TA   [] Neuro   [] Self Care Patient Education: [x] Review HEP    [x] Progressed/Changed HEP based on:   [x] positioning   [x] body mechanics   [] transfers   [] heat/ice application    [] other:      Other Objective/Functional Measures:    Progressed rboby navigation to outside of // bars with good tolerance - intermittently circumducts R LE to clear hurdles  Progressed standing hip ABD by added 2lb ankle weights and sit to stand to squat with TRX  Supine bridge = 75% hip excursion      Post Treatment Pain Level (on 0 to 10) scale:   0  / 10 ASSESSMENT  Assessment/Changes in Function:     Required increased cues for TRX squatting in order to ensure max benefit from exercise. Demonstrates safe and good dynamic balance with performance of small reciprocal robby navigation outside // bars      []  See Progress Note/Recertification   Patient will continue to benefit from skilled PT services to modify and progress therapeutic interventions, address functional mobility deficits, address ROM deficits, address strength deficits, analyze and address soft tissue restrictions, analyze and cue movement patterns, analyze and modify body mechanics/ergonomics, assess and modify postural abnormalities and instruct in home and community integration to attain remaining goals. Progress toward goals / Updated goals:    1) Establish HEP.   Goal Met compliant with HEP (11/29/22)  2) Patient will report decreased c/o pain to < or = 6/10 at the worst to facilitate prolonged standing ADL's with manageable sx in L/S and ZENAIDA knees Goal in progress 2/10 at the worst \"only in the knees when I get up\" (12/7/22)     PLAN  [x]  Upgrade activities as tolerated yes Continue plan of care   []  Discharge due to :    []  Other:      Therapist: Iraj Duggan    Date: 12/7/2022 Time: 8:48 AM     Future Appointments   Date Time Provider Kelin Wilkins   12/12/2022  9:00 AM Shaheen Gambino [facility]   12/14/2022  7:00 AM Jefm Govern [facility]   12/19/2022  8:20 AM Patricia Alvarado [facility]   12/21/2022  7:00 AM [facility]   12/27/2022  7:00 AM [facility]   12/28/2022  7:00 AM Sigrid Alvarado

## 2022-12-12 ENCOUNTER — HOSPITAL ENCOUNTER (OUTPATIENT)
Dept: PHYSICAL THERAPY | Age: 87
Discharge: HOME | End: 2022-12-12
Payer: MEDICARE

## 2022-12-12 PROCEDURE — 97110 THERAPEUTIC EXERCISES: CPT

## 2022-12-12 NOTE — PROGRESS NOTES
PHYSICAL THERAPY - DAILY TREATMENT NOTE    Patient Name: Edel Prince        Date: 2022  : 1935   Yes Patient  Verified  Visit #:    Insurance: Payor: Noble Melo / Plan: [place] MEDICARE PART A & B / Product Type: Medicare /      In time: 151 Out time: 9:43   Total Treatment Time: 42     Medicare/BCBS Time Tracking (below)   Total Timed Codes (min):  42 1:1 Treatment Time: 42     TREATMENT AREA =  Other low back pain [M54.59]    SUBJECTIVE  Pain Level (on 0 to 10 scale):  0  / 10   Medication Changes/New allergies or changes in medical history, any new surgeries or procedures?    no  If yes, update Summary List   Subjective Functional Status/Changes:  []  No changes reported     Stiff when I got up but loosening up          OBJECTIVE  Modalities Rationale:     42 min Therapeutic Exercise:  [x]  See flow sheet   Rationale:      increase ROM and increase strength to improve the patients ability to tolerate prolonged standing      NT min Manual Therapy: S/L STM to ZENAIDA ITB and L/s Paraspinals, ZENAIDA piriformis TrP   Rationale:      decrease pain, increase ROM, increase tissue extensibility, and decrease trigger points to improve patient's ability to perform ADLs. The manual therapy interventions were performed at a separate and distinct time from the therapeutic activities interventions. Billed With/As:   [x] TE   [] TA   [] Neuro   [] Self Care Patient Education: [x] Review HEP    [x] Progressed/Changed HEP based on:   [x] positioning   [x] body mechanics   [] transfers   [] heat/ice application    [] other:      Other Objective/Functional Measures:     Added mini band lateral walking   Advanced L LE step up to 8\" without HRs - incr forward trunk lean and challenge with R LE step up; Progressed standing hip ABD to S/L   Added LAQ  Progressed standing hip ABD to S/L with MAX CUES FOR PROPER FORM   Post Treatment Pain Level (on 0 to 10) scale:   0  / 10     ASSESSMENT  Assessment/Changes in Function:     Advanced treatment program difficulty with good tolerance. []  See Progress Note/Recertification   Patient will continue to benefit from skilled PT services to modify and progress therapeutic interventions, address functional mobility deficits, address ROM deficits, address strength deficits, analyze and address soft tissue restrictions, analyze and cue movement patterns, analyze and modify body mechanics/ergonomics, assess and modify postural abnormalities and instruct in home and community integration to attain remaining goals. Progress toward goals / Updated goals:    1) Establish HEP.   Goal Met compliant with HEP (11/29/22)  2) Patient will report decreased c/o pain to < or = 6/10 at the worst to facilitate prolonged standing ADL's with manageable sx in L/S and ZENAIDA knees Goal in progress 2/10 at the worst \"only in the knees when I get up\" (12/7/22)     PLAN  [x]  Upgrade activities as tolerated yes Continue plan of care   []  Discharge due to :    []  Other:      Therapist: Janene Browne    Date: 12/12/2022 Time: 8:48 AM     Future Appointments   Date Time Provider Kelin Wilkins   12/12/2022  9:00 AM Roldan Espinal [facility]   12/14/2022  7:00 AM Willis Vaughn [facility]   12/19/2022  8:20 AM Willie Alvarado [facility]   12/21/2022  7:00 AM Manuelita Johana [facility] [address]   12/27/2022  7:00 AM Manuelita Farragut [facility]   12/28/2022  7:00 AM Yahaira Alvarado

## 2022-12-14 ENCOUNTER — HOSPITAL ENCOUNTER (OUTPATIENT)
Dept: PHYSICAL THERAPY | Age: 87
Discharge: HOME | End: 2022-12-14
Payer: MEDICARE

## 2022-12-14 PROCEDURE — 97110 THERAPEUTIC EXERCISES: CPT

## 2022-12-14 NOTE — PROGRESS NOTES
PHYSICAL THERAPY - DAILY TREATMENT NOTE    Patient Name: Kay Moreira        Date: 2022  : 1935   Yes Patient  Verified  Visit #:    Insurance: Payor: Felix Memory / Plan: [place] MEDICARE PART A & B / Product Type: Medicare /      In time: 7:01 Out time: 742   Total Treatment Time: 41     Medicare/BCBS Time Tracking (below)   Total Timed Codes (min):  41 1:1 Treatment Time: 41     TREATMENT AREA =  Other low back pain [M54.59]    SUBJECTIVE  Pain Level (on 0 to 10 scale):  0  / 10   Medication Changes/New allergies or changes in medical history, any new surgeries or procedures?    no  If yes, update Summary List   Subjective Functional Status/Changes:  []  No changes reported     Reports right knee used to cave in when standing for prolonged periods but now it doesn't do that as often. Reports increases in ZENAIDA LE strength          OBJECTIVE  Modalities Rationale:     41 min Therapeutic Exercise:  [x]  See flow sheet   Rationale:      increase ROM and increase strength to improve the patients ability to tolerate prolonged standing      NT min Manual Therapy: S/L STM to ZENAIDA ITB and L/s Paraspinals, ZENAIDA piriformis TrP   Rationale:      decrease pain, increase ROM, increase tissue extensibility, and decrease trigger points to improve patient's ability to perform ADLs. The manual therapy interventions were performed at a separate and distinct time from the therapeutic activities interventions.      Billed With/As:   [x] TE   [] TA   [] Neuro   [] Self Care Patient Education: [x] Review HEP    [x] Progressed/Changed HEP based on:   [x] positioning   [x] body mechanics   [] transfers   [] heat/ice application    [] other:      Other Objective/Functional Measures:    Requires HR for R LE step up  Progressed hurdles by adding in some large hurdles to clear - intermittently circumducts hip to clear   Added LP  ~75% Bridge     Post Treatment Pain Level (on 0 to 10) scale:   0  / 10 ASSESSMENT  Assessment/Changes in Function:     Making good gains with PT services - good tolerance for progressions in strengthening. []  See Progress Note/Recertification   Patient will continue to benefit from skilled PT services to modify and progress therapeutic interventions, address functional mobility deficits, address ROM deficits, address strength deficits, analyze and address soft tissue restrictions, analyze and cue movement patterns, analyze and modify body mechanics/ergonomics, assess and modify postural abnormalities and instruct in home and community integration to attain remaining goals.    Progress toward goals / Updated goals:    Formal re-assessment NV     PLAN  [x]  Upgrade activities as tolerated yes Continue plan of care   []  Discharge due to :    []  Other:      Therapist: Micky Granger    Date: 12/14/2022 Time: 8:48 AM     Future Appointments   Date Time Provider Kelin Wilkins   12/19/2022  8:20 AM Yenifer Xiao [address] [facility]   12/21/2022  7:00 AM Yenifer [facility] [address]   12/27/2022  7:00 AM Yenifer Cameron [address]   12/28/2022  7:00 AM [address]

## 2022-12-19 ENCOUNTER — HOSPITAL ENCOUNTER (OUTPATIENT)
Dept: PHYSICAL THERAPY | Age: 87
Discharge: HOME | End: 2022-12-19
Payer: MEDICARE

## 2022-12-19 PROCEDURE — 97110 THERAPEUTIC EXERCISES: CPT

## 2022-12-19 NOTE — THERAPY RECERTIFICATION
[address],[facility], [facility] - Phone: [phone]  Fax: 21 [phone] OF CARE/RECERTIFICATION FOR PHYSICAL THERAPY          Patient Name: Rocky Rosenberg : 1935   Treatment/Medical Diagnosis: Other low back pain [M54.59]   Onset Date: Chronic    Referral Source: [facility] Start of Care [facility]): 2022   Prior Hospitalization: See Medical History Provider #: [id number]   Prior Level of Function: I in all activities; able to ambulate > 5 miles   Comorbidities: Latex allergy, HTN, DM, scoliosis   Medications: Verified on Patient Summary List   Vis[facility] from [facility]: 9 Missed Visits: 0     Goal/Measure of Progress Goal Met? 1.  Establish HEP. Status at last Eval: Goal established Current Status: HEP established yes   2. Patient will report decreased c/o pain to < or = 6/10 at the worst to facilitate prolonged standing ADL's with manageable sx in L/S and ZENAIDA knees   Status at last Eval: 8/10  Current Status: 10/10 no     Key Functional Changes/Progress: The pt has progressed well with therapy, consistently reporting decreased pain and increased functional ability. Currently, the patient's main complaint is ZENAIDA knee and back stiffness. Average LBP and ZENAIDA Knee pain is rated at  6/10 and 10/10 at the worst. Pt reports improved LE strength and improved tolerance for ambulation prior to onset of soreness since starting PT services. Demonstrates impaired static standing balance R/L SLS 5/4\". Functional impairments: Stooping and walking (~10 minutes) Pt would benefit from continued PT services in order to decrease ZENAIDA Knee and LBP pain and TTP, improve ROM, strength, flexibility, balance, joint mobility and address remaining impairments.    Problem List: pain affecting function, decrease ROM, decrease strength, edema affecting function, impaired gait/ balance, decrease ADL/ functional abilitiies, decrease activity tolerance, decrease flexibility/ joint mobility and decrease transfer abilities   Treatment Plan may include any combination of the following: Therapeutic exercise, Therapeutic activities, Neuromuscular re-education, Physical agent/modality, Gait/balance training, Manual therapy, Aquatic therapy, Patient education, Self Care training, Functional mobility training, Home safety training and Stair training  Patient Goal(s) has been updated and includes:     Goals for this certification period include and are to be achieved in   4  weeks:  1) Patient to be independent & compliant with a progressive, high level HEP in order to maintain gains made in physical therapy. 2) Patient will report decreased c/o pain to < or = 5/10 at the worst to facilitate prolonged standing ADL's with manageable sx in L/S and ZENAIDA knees. 3) Increase FOTO to 67/100 indicating improved function and quality of life. 4) Patient to perform  bridge 10x10\" indicating improved core strength to improve ambulation around the grocery store. Frequency / Duration:   Patient to be seen   2   times per week for   4    weeks:    Assessments/Recommendations: Continue therapy with the following recommendations: 2x per week for 4 weeks  If you have any questions/comments please contact us directly at [phone]. Thank you for allowing us to assist in the care of your patient.     Therapist Signature: Willie Alvarado Date: 14/31/8530   Certification Period:  Reporting Period: 11/17/2022 to 2/16/2022 11/17/2022 to 12/19/2022 Time: 8:27 AM   NOTE TO PHYSICIAN:  PLEASE COMPLETE THE ORDERS BELOW AND FAX TO   [facility]: [phone]  If you are unable to process this request in 24 hours please contact our office: [phone]    ___ I have read the above report and request that my patient continue as recommended.   ___ I have read the above report and request that my patient continue therapy with the following changes/special instructions: ________________________________________________   ___ I have read the above report and request that my patient be discharged from therapy.      Physician Signature:        Date:       Time:                                        Milan Wilkerson, *

## 2022-12-19 NOTE — PROGRESS NOTES
PHYSICAL THERAPY - DAILY TREATMENT NOTE    Patient Name: Patricia Richter        Date: 2022  : 1935   Yes Patient  Verified  Visit #:    Insurance: Payor: Sheridan Okeefe / Plan: [place] MEDICARE PART A & B / Product Type: Medicare /      In time: 8:24 Out time: 9:02   Total Treatment Time: 38     Medicare/BCBS Time Tracking (below)   Total Timed Codes (min):  38 1:1 Treatment Time: 38     TREATMENT AREA =  Other low back pain [M54.59]    SUBJECTIVE  Pain Level (on 0 to 10 scale):  0  / 10   Medication Changes/New allergies or changes in medical history, any new surgeries or procedures?    no  If yes, update Summary List   Subjective Functional Status/Changes:  []  No changes reported     CC stiffness             OBJECTIVE  Modalities Rationale:     38 min Therapeutic Exercise:  [x]  See flow sheet   Rationale:      increase ROM and increase strength to improve the patients ability to tolerate prolonged standing      NT min Manual Therapy: S/L STM to ZENAIDA ITB and L/s Paraspinals, ZENAIDA piriformis TrP   Rationale:      decrease pain, increase ROM, increase tissue extensibility, and decrease trigger points to improve patient's ability to perform ADLs. The manual therapy interventions were performed at a separate and distinct time from the therapeutic activities interventions.      Billed With/As:   [x] TE   [] TA   [] Neuro   [] Self Care Patient Education: [x] Review HEP    [x] Progressed/Changed HEP based on:   [x] positioning   [x] body mechanics   [] transfers   [] heat/ice application    [] other:      Other Objective/Functional Measures:    See Note     Post Treatment Pain Level (on 0 to 10) scale:   0  / 10     ASSESSMENT  Assessment/Changes in Function:     See Note     []  See Progress Note/Recertification   Patient will continue to benefit from skilled PT services to modify and progress therapeutic interventions, address functional mobility deficits, address ROM deficits, address strength deficits, analyze and address soft tissue restrictions, analyze and cue movement patterns, analyze and modify body mechanics/ergonomics, assess and modify postural abnormalities and instruct in home and community integration to attain remaining goals.    Progress toward goals / Updated goals:    See Note     PLAN  [x]  Upgrade activities as tolerated yes Continue plan of care   []  Discharge due to :    []  Other:      Therapist: Naina Dempsey    Date: 12/19/2022 Time: 8:48 AM     Future Appointments   Date Time Provider Kelin Wilkins   12/19/2022  8:20 AM Vineet Maldonado [facility]   12/21/2022  7:00 AM [place], [address] [facility]   12/27/2022  7:00 AM Clarance Maldonado [facility] [address]   12/28/2022  7:00 AM Vineet Maldonado [facility]   1/3/2023  7:30 AM Clarance Maldonado [facility] [address]   1/5/2023  7:30 AM Clarance Maldonado [facility]   1/10/2023  7:30 AM Clarance Maldonado [facility]   1/12/2023  7:30 AM [place], Sissy Cordia [facility]

## 2022-12-21 ENCOUNTER — HOSPITAL ENCOUNTER (OUTPATIENT)
Dept: PHYSICAL THERAPY | Age: 87
Discharge: HOME | End: 2022-12-21
Payer: MEDICARE

## 2022-12-21 PROCEDURE — 97110 THERAPEUTIC EXERCISES: CPT

## 2022-12-21 NOTE — PROGRESS NOTES
PHYSICAL THERAPY - DAILY TREATMENT NOTE    Patient Name: Edel Prince        Date: 2022  : 1935   Yes Patient  Verified  Visit #:  10 of   12  Insurance: Payor: Noble Beets / Plan: [place] MEDICARE PART A & B / Product Type: Medicare /      In time: 626 Out time: 869   Total Treatment Time: 39     Medicare/BCBS Time Tracking (below)   Total Timed Codes (min):  39 1:1 Treatment Time: 39     TREATMENT AREA =  Other low back pain [M54.59]    SUBJECTIVE  Pain Level (on 0 to 10 scale):  0  / 10   Medication Changes/New allergies or changes in medical history, any new surgeries or procedures?    no  If yes, update Summary List   Subjective Functional Status/Changes:  []  No changes reported     Spaces inbetween are longer that its not bothering me          OBJECTIVE  Modalities Rationale:     39 min Therapeutic Exercise:  [x]  See flow sheet   Rationale:      increase ROM and increase strength to improve the patients ability to tolerate prolonged standing      NT min Manual Therapy: S/L STM to ZENAIDA ITB and L/s Paraspinals, ZENAIDA piriformis TrP   Rationale:      decrease pain, increase ROM, increase tissue extensibility, and decrease trigger points to improve patient's ability to perform ADLs. The manual therapy interventions were performed at a separate and distinct time from the therapeutic activities interventions.      Billed With/As:   [x] TE   [] TA   [] Neuro   [] Self Care Patient Education: [x] Review HEP    [x] Progressed/Changed HEP based on:   [x] positioning   [x] body mechanics   [] transfers   [] heat/ice application    [] other:      Other Objective/Functional Measures:    Improved robby navigation without use of circumduction compensatory motion   R quad weakness noted with R LE 8\" step up  R knee valgus with lowering during LP     Post Treatment Pain Level (on 0 to 10) scale:   0  / 10     ASSESSMENT  Assessment/Changes in Function:     Decreased eccentric control and quadriceps strength with squatting. Attempted squat without TRX however without TRX pt demonstrates decreased R knee stability. []  See Progress Note/Recertification   Patient will continue to benefit from skilled PT services to modify and progress therapeutic interventions, address functional mobility deficits, address ROM deficits, address strength deficits, analyze and address soft tissue restrictions, analyze and cue movement patterns, analyze and modify body mechanics/ergonomics, assess and modify postural abnormalities and instruct in home and community integration to attain remaining goals.    Progress toward goals / Updated goals:     Patient will report decreased c/o pain to < or = 6/10 at the worst to facilitate prolonged standing ADL's with manageable sx in L/S and ZENAIDA knees Goal in progress reporting increased time between episodes of pain (12/21/22)        PLAN  [x]  Upgrade activities as tolerated yes Continue plan of care   []  Discharge due to :    []  Other:      Therapist: Luís Farias    Date: 12/21/2022 Time: 8:48 AM     Future Appointments   Date Time Provider Kelin Wilkins   12/27/2022  7:00 AM Lauren Soto   12/28/2022  7:00 AM Merlyn Clemons [facility]   1/3/2023  7:30 AM Merlyn [facility] [address]   1/5/2023  7:30 AM Merlyn Clemons [facility]   1/10/2023  7:30 AM Merlyn Clemons [facility]   1/12/2023  7:30 AM Rodríguez Alvarado [address] [facility]

## 2022-12-27 ENCOUNTER — HOSPITAL ENCOUNTER (OUTPATIENT)
Dept: PHYSICAL THERAPY | Age: 87
Discharge: HOME | End: 2022-12-27
Payer: MEDICARE

## 2022-12-27 PROCEDURE — 97110 THERAPEUTIC EXERCISES: CPT

## 2022-12-27 NOTE — PROGRESS NOTES
PHYSICAL THERAPY - DAILY TREATMENT NOTE    Patient Name: Wade Farley        Date: 2022  : 1935   Yes Patient  Verified  Visit #:    Insurance: Payor: Adama Braga / Plan: [place] MED[place] PART [place] & B / Product Type: Medicare /      In time: 6:59 Out time: 7:39   Total Treatment Time: 40     Medicare/BCBS Time Tracking (below)   Total Timed Codes (min):  40 1:1 Treatment Time: 40     TREATMENT AREA =  Other low back pain [M54.59]    SUBJECTIVE  Pain Level (on 0 to 10 scale):  0  / 10   Medication Changes/New allergies or changes in medical history, any new surgeries or procedures?    no  If yes, update Summary List   Subjective Functional Status/Changes:  []  No changes reported     Notes calling doctor seconday to R knee weakness; notes alays using the left leg more         OBJECTIVE  Modalities Rationale:     40 min Therapeutic Exercise:  [x]  See flow sheet   Rationale:      increase ROM and increase strength to improve the patients ability to tolerate prolonged standing      NT min Manual Therapy: S/L STM to ZENAIDA ITB and L/s Paraspinals, ZENAIDA piriformis TrP   Rationale:      decrease pain, increase ROM, increase tissue extensibility, and decrease trigger points to improve patient's ability to perform ADLs. The manual therapy interventions were performed at a separate and distinct time from the therapeutic activities interventions. Billed With/As:   [x] TE   [] TA   [] Neuro   [] Self Care Patient Education: [x] Review HEP    [x] Progressed/Changed HEP based on:   [x] positioning   [x] body mechanics   [] transfers   [] heat/ice application    [] other:      Other Objective/Functional Measures: Added tap paul and R SL LP     Post Treatment Pain Level (on 0 to 10) scale:   0  / 10     ASSESSMENT  Assessment/Changes in Function:     Improved ability to clear large hurdles non reciprocally. Continues to demonstrate challenge with R LE step up on 8\" secondary to R LE weakness. Progressed R SL strengthening isolation therex. []  See Progress Note/Recertification   Patient will continue to benefit from skilled PT services to modify and progress therapeutic interventions, address functional mobility deficits, address ROM deficits, address strength deficits, analyze and address soft tissue restrictions, analyze and cue movement patterns, analyze and modify body mechanics/ergonomics, assess and modify postural abnormalities and instruct in home and community integration to attain remaining goals.    Progress toward goals / Updated goals:     Patient will report decreased c/o pain to < or = 6/10 at the worst to facilitate prolonged standing ADL's with manageable sx in L/S and ZENAIDA knees Goal in progress reporting increased time between episodes of pain (12/21/22)        PLAN  [x]  Upgrade activities as tolerated yes Continue plan of care   []  Discharge due to :    []  Other:      Therapist: Mark Pate    Date: 12/27/2022 Time: 8:48 AM     Future Appointments   Date Time Provider Kelin Wilkins   12/27/2022  7:00 AM Lauren Soto   12/28/2022  7:00 AM [facility]   1/3/2023  7:30 AM Alaina Second [address]   1/5/2023  7:30 AM [place] [facility]   1/10/2023  7:30 AM [place] [facility]   1/12/2023  7:30 AM Pina Alvarado [facility]

## 2022-12-28 ENCOUNTER — HOSPITAL ENCOUNTER (OUTPATIENT)
Dept: PHYSICAL THERAPY | Age: 87
Discharge: HOME | End: 2022-12-28
Payer: MEDICARE

## 2022-12-28 PROCEDURE — 97110 THERAPEUTIC EXERCISES: CPT

## 2022-12-28 NOTE — PROGRESS NOTES
PHYSICAL THERAPY - DAILY TREATMENT NOTE    Patient Name: Shima López        Date: 2022  : 1935   Yes Patient  Verified  Visit #:    Insurance: Payor: Tim Alva / Plan: [place] MEDICARE PART A & B / Product Type: Medicare /      In time: 700 Out time: 740   Total Treatment Time: 40     Medicare/BCBS Time Tracking (below)   Total Timed Codes (min):  40 1:1 Treatment Time: 40     TREATMENT AREA =  Other low back pain [M54.59]    SUBJECTIVE  Pain Level (on 0 to 10 scale):  0  / 10   Medication Changes/New allergies or changes in medical history, any new surgeries or procedures?    no  If yes, update Summary List   Subjective Functional Status/Changes:  []  No changes reported     Notes she is getting in to see the doctor tomorrow         OBJECTIVE  Modalities Rationale:     40 min Therapeutic Exercise:  [x]  See flow sheet   Rationale:      increase ROM and increase strength to improve the patients ability to tolerate prolonged standing      NT min Manual Therapy: S/L STM to ZENAIDA ITB and L/s Paraspinals, ZENAIDA piriformis TrP   Rationale:      decrease pain, increase ROM, increase tissue extensibility, and decrease trigger points to improve patient's ability to perform ADLs. The manual therapy interventions were performed at a separate and distinct time from the therapeutic activities interventions. Billed With/As:   [x] TE   [] TA   [] Neuro   [] Self Care Patient Education: [x] Review HEP    [x] Progressed/Changed HEP based on:   [x] positioning   [x] body mechanics   [] transfers   [] heat/ice application    [] other:      Other Objective/Functional Measures:     Added core band march  Improving static balance on R LE however continues to demonstrate incr ankle strategy with L SLS  Marked R knee valgus with eccentric lowering during tap down      Post Treatment Pain Level (on 0 to 10) scale:   0  / 10     ASSESSMENT  Assessment/Changes in Function:     Progressed core strengthening with increased challenge. Will follow up NV to discuss findings at follow up visit with PCP. []  See Progress Note/Recertification   Patient will continue to benefit from skilled PT services to modify and progress therapeutic interventions, address functional mobility deficits, address ROM deficits, address strength deficits, analyze and address soft tissue restrictions, analyze and cue movement patterns, analyze and modify body mechanics/ergonomics, assess and modify postural abnormalities and instruct in home and community integration to attain remaining goals.    Progress toward goals / Updated goals:     Patient will report decreased c/o pain to < or = 6/10 at the worst to facilitate prolonged standing ADL's with manageable sx in L/S and ZENAIDA knees Goal in progress reporting increased time between episodes of pain (12/21/22)        PLAN  [x]  Upgrade activities as tolerated yes Continue plan of care   []  Discharge due to :    []  Other:      Therapist: Allan Hairston    Date: 12/28/2022 Time: 8:48 AM     Future Appointments   Date Time Provider Kelin Wilkins   1/3/2023  7:30 AM [facility]   1/5/2023  7:30 AM [facility]   1/10/2023  7:30 AM [place] [facility]   1/12/2023  7:30 AM So Alvarado [facility]

## 2023-01-03 ENCOUNTER — HOSPITAL ENCOUNTER (OUTPATIENT)
Dept: PHYSICAL THERAPY | Age: 88
Discharge: HOME | End: 2023-01-03
Payer: MEDICARE

## 2023-01-03 PROCEDURE — 97110 THERAPEUTIC EXERCISES: CPT

## 2023-01-03 NOTE — PROGRESS NOTES
PHYSICAL THERAPY - DAILY TREATMENT NOTE    Patient Name: Susan Dupree        Date: 1/3/2023  : 1935   Yes Patient  Verified  Visit #:  15 of   12+8  Insurance: Payor: [address] / Plan: [place] MEDICARE PART A & B / Product Type: Medicare /      In time: 7:31 Out time: 8:16   Total Treatment Time: 45     Medicare/BCBS Time Tracking (below)   Total Timed Codes (min):  45 1:1 Treatment Time: 29     TREATMENT AREA =  Other low back pain [M54.59]    SUBJECTIVE  Pain Level (on 0 to 10 scale):  0  / 10   Medication Changes/New allergies or changes in medical history, any new surgeries or procedures?    no  If yes, update Summary List   Subjective Functional Status/Changes:  []  No changes reported     She was saying it was weak muscles         OBJECTIVE  Modalities Rationale:     45 (bill 29) min Therapeutic Exercise:  [x]  See flow sheet   Rationale:      increase ROM and increase strength to improve the patients ability to tolerate prolonged standing      NT min Manual Therapy: S/L STM to ZENAIDA ITB and L/s Paraspinals, ZENAIDA piriformis TrP   Rationale:      decrease pain, increase ROM, increase tissue extensibility, and decrease trigger points to improve patient's ability to perform ADLs. The manual therapy interventions were performed at a separate and distinct time from the therapeutic activities interventions. Billed With/As:   [x] TE   [] TA   [] Neuro   [] Self Care Patient Education: [x] Review HEP    [x] Progressed/Changed HEP based on:   [x] positioning   [x] body mechanics   [] transfers   [] heat/ice application    [] other:      Other Objective/Functional Measures:    Some difficulty with large robby navigating with use of stepping strategy to maintain balance     Post Treatment Pain Level (on 0 to 10) scale:   0  / 10     ASSESSMENT  Assessment/Changes in Function:     Updated and issued HEP. Cont R knee valgus motion with eccentric lowering.       []  See Progress Note/Recertification Patient will continue to benefit from skilled PT services to modify and progress therapeutic interventions, address functional mobility deficits, address ROM deficits, address strength deficits, analyze and address soft tissue restrictions, analyze and cue movement patterns, analyze and modify body mechanics/ergonomics, assess and modify postural abnormalities and instruct in home and community integration to attain remaining goals.    Progress toward goals / Updated goals:     Patient will report decreased c/o pain to < or = 6/10 at the worst to facilitate prolonged standing ADL's with manageable sx in L/S and ZENAIDA knees Goal in progress reporting increased time between episodes of pain (12/21/22)        PLAN  [x]  Upgrade activities as tolerated yes Continue plan of care   []  Discharge due to :    []  Other:      Therapist: Pinky Carballo    Date: 1/3/2023 Time: 8:48 AM     Future Appointments   Date Time Provider Kelin Wilkins   1/3/2023  7:30 AM Amanda Gregory [address] [facility]   1/5/2023  7:30 AM Amanda Gregory [address] [facility]   1/10/2023  7:30 AM Amanda Gregory [facility]   1/12/2023  7:30 AM Mara Alvarado [address] [facility]

## 2023-01-05 ENCOUNTER — HOSPITAL ENCOUNTER (OUTPATIENT)
Dept: PHYSICAL THERAPY | Age: 88
Discharge: HOME | End: 2023-01-05
Payer: MEDICARE

## 2023-01-05 PROCEDURE — 97110 THERAPEUTIC EXERCISES: CPT

## 2023-01-05 NOTE — PROGRESS NOTES
PHYSICAL THERAPY - DAILY TREATMENT NOTE    Patient Name: Salty Villegas        Date: 2023  : 1935   Yes Patient  Verified  Visit #:  15 of   12+8  Insurance: Payor: Niko Anderson / Plan: [place] MEDICARE PART A & B / Product Type: Medicare /      In time: 7:32 Out time: 723   Total Treatment Time: 52     Medicare/BCBS Time Tracking (below)   Total Timed Codes (min):  52 1:1 Treatment Time: 28     TREATMENT AREA =  Other low back pain [M54.59]    SUBJECTIVE  Pain Level (on 0 to 10 scale):  0  / 10   Medication Changes/New allergies or changes in medical history, any new surgeries or procedures?    no  If yes, update Summary List   Subjective Functional Status/Changes:  []  No changes reported     Did exercises yesterday            OBJECTIVE  Modalities Rationale:     52  (bill 28) min Therapeutic Exercise:  [x]  See flow sheet   Rationale:      increase ROM and increase strength to improve the patients ability to tolerate prolonged standing      NT min Manual Therapy: S/L STM to ZENAIDA ITB and L/s Paraspinals, ZENAIDA piriformis TrP   Rationale:      decrease pain, increase ROM, increase tissue extensibility, and decrease trigger points to improve patient's ability to perform ADLs. The manual therapy interventions were performed at a separate and distinct time from the therapeutic activities interventions. Billed With/As:   [x] TE   [] TA   [] Neuro   [] Self Care Patient Education: [x] Review HEP    [x] Progressed/Changed HEP based on:   [x] positioning   [x] body mechanics   [] transfers   [] heat/ice application    [] other:      Other Objective/Functional Measures:    Modified R LE step up height to 6\" with improved tolerance   Marked R knee valgus with eccentric lowering   Post Treatment Pain Level (on 0 to 10) scale:   0  / 10     ASSESSMENT  Assessment/Changes in Function:     Improving ability to maintain SLS static balance with decreased use of UEs to maintain balance.  Good eccentric control with L LE tap down.      []  See Progress Note/Recertification   Patient will continue to benefit from skilled PT services to modify and progress therapeutic interventions, address functional mobility deficits, address ROM deficits, address strength deficits, analyze and address soft tissue restrictions, analyze and cue movement patterns, analyze and modify body mechanics/ergonomics, assess and modify postural abnormalities and instruct in home and community integration to attain remaining goals.    Progress toward goals / Updated goals:     Patient will report decreased c/o pain to < or = 6/10 at the worst to facilitate prolonged standing ADL's with manageable sx in L/S and ZENAIDA knees Goal in progress reporting increased time between episodes of pain (12/21/22)        PLAN  [x]  Upgrade activities as tolerated yes Continue plan of care   []  Discharge due to :    []  Other:      Therapist: Sean Courser    Date: 1/5/2023 Time: 8:48 AM     Future Appointments   Date Time Provider Kelin Wilkins   1/10/2023  7:30 AM Michael Stover [facility]   1/12/2023  7:30 AM Meeta Alvarado [facility]

## 2023-01-10 ENCOUNTER — APPOINTMENT (OUTPATIENT)
Dept: PHYSICAL THERAPY | Age: 88
End: 2023-01-10
Payer: MEDICARE

## 2023-01-12 ENCOUNTER — APPOINTMENT (OUTPATIENT)
Dept: PHYSICAL THERAPY | Age: 88
End: 2023-01-12
Payer: MEDICARE

## 2023-01-17 ENCOUNTER — HOSPITAL ENCOUNTER (OUTPATIENT)
Dept: PHYSICAL THERAPY | Age: 88
Discharge: HOME | End: 2023-01-17
Payer: MEDICARE

## 2023-01-17 PROCEDURE — 97110 THERAPEUTIC EXERCISES: CPT

## 2023-01-17 NOTE — THERAPY RECERTIFICATION
[address],[facility], [facility] - Phone: [phone]  Fax: [phone] OF CARE/RECERTIFICATION FOR PHYSICAL THERAPY          Patient Name: Elaine Steen : 1935   Treatment/Medical Diagnosis: Other low back pain [M54.59]   Onset Date: Chronic    Referral Source: Lukas Laird Start of Care [facility]): 2022   Prior Hospitalization: See Medical History Provider #: [id number]   Prior Level of Function: I in all activities; able to ambulate > 5 miles   Comorbidities: Latex allergy, HTN, DM, scoliosis   Medications: Verified on Patient Summary List   Visits from [facility]: 14 Missed Visits: 0     Goal/Measure of Progress Goal Met? 1. Patient to perform  bridge 10x10\" indicating improved core strength to improve ambulation around the grocery store. Status at last Eval: Goal established Current Status: Bridge = 10x10\" yes   2. Patient will report decreased c/o pain to < or = 6/10 at the worst to facilitate prolonged standing ADL's with manageable sx in L/S and ZENAIDA knees   Status at last Eval: 10/10  Current Status: 9/10 Progressing     Key Functional Changes/Progress: Currently, the patient's main complaint is difficulty with stair navigation and stooping/lowering with the right knee. Average LBP and ZENAIDA Knee pain is rated at 6/10 and 9/10 at the worst. R/L MMT: knee extension 5-/5-, Knee flexion 4+/4+, hip ABD 2+/2+, Hip flexion 4/4, hip extension 3+/2+, and ankle DF 5/5-. Demonstrates weakness and increased knee valgus with R LE 8\" step up and 4\" tap down. Pt would benefit from continued PT services in order to decrease ZENAIDA Knee and LBP pain and TTP, improve ROM, strength, flexibility, balance, joint mobility and address remaining impairments.    Problem List: pain affecting function, decrease ROM, decrease strength, edema affecting function, impaired gait/ balance, decrease ADL/ functional abilitiies, decrease activity tolerance, decrease flexibility/ joint mobility and decrease transfer abilities   Treatment Plan may include any combination of the following: Therapeutic exercise, Therapeutic activities, Neuromuscular re-education, Physical agent/modality, Gait/balance training, Manual therapy, Aquatic therapy, Patient education, Self Care training, Functional mobility training, Home safety training and Stair training  Patient Goal(s) has been updated and includes:     Goals for this certification period include and are to be achieved in   4  weeks:  1) Pt will have improved control of R LE with eccentric step down, showing fair alignment of LE and control 1 sets of 10 reps on 4 inch box in order to have improved benjamin for repetitive stair navigation  2) Patient will report decreased c/o pain to < or = 5/10 at the worst to facilitate prolonged standing ADL's with manageable sx in L/S and ZENAIDA knees. 3) Increase FOTO to 67/100 indicating improved function and quality of life. 4) Patient to increase ZENAIDA gluteus medius strength to 3/5 in order to improve ability to tolerate prolonged standing. Frequency / Duration:   Patient to be seen   1-2   times per week for   4    weeks:    Assessments/Recommendations: Continue therapy with the following recommendations: 1-2x per week for 4 weeks  If you have any questions/comments please contact us directly at [phone]. Thank you for allowing us to assist in the care of your patient.     Therapist Signature: Nelda Alvarado Date: 1/21/9808   Certification Period:  Reporting Period: 11/17/2022 to 2/16/2022 11/17/2022 to 1/17/2023 Time: 8:27 AM   NOTE TO PHYSICIAN:  PLEASE COMPLETE THE ORDERS BELOW AND FAX TO   [facility] Physical [facility]: [phone]  If you are unable to process this request in 24 hours please contact our office: [phone]    ___ I have read the above report and request that my patient continue as recommended.   ___ I have read the above report and request that my patient continue therapy with the following changes/special instructions: ________________________________________________   ___ I have read the above report and request that my patient be discharged from therapy.      Physician Signature:        Date:       Time:                                        Robinette Claude, *

## 2023-01-17 NOTE — PROGRESS NOTES
PHYSICAL THERAPY - DAILY TREATMENT NOTE    Patient Name: Shree Mason        Date: 2023  : 1935   Yes Patient  Verified  Visit #:  15 of   12+8  Insurance: Payor: [address] / Plan: [place] MEDICARE PART A & B / Product Type: Medicare /      In time: 7:59 Out time: 826   Total Treatment Time: 60     Medicare/BCBS Time Tracking (below)   Total Timed Codes (min):  60 1:1 Treatment Time: 29     TREATMENT AREA =  Other low back pain [M54.59]    SUBJECTIVE  Pain Level (on 0 to 10 scale):  0  / 10   Medication Changes/New allergies or changes in medical history, any new surgeries or procedures?    no  If yes, update Summary List   Subjective Functional Status/Changes:  []  No changes reported     My walking my back doesn't get tiered as quick; im feeling much better; I can tell theres a difference           OBJECTIVE  Modalities Rationale:     60  (bill 29) min Therapeutic Exercise:  [x]  See flow sheet   Rationale:      increase ROM and increase strength to improve the patients ability to tolerate prolonged standing      NT min Manual Therapy: S/L STM to ZENAIDA ITB and L/s Paraspinals, ZENAIDA piriformis TrP   Rationale:      decrease pain, increase ROM, increase tissue extensibility, and decrease trigger points to improve patient's ability to perform ADLs. The manual therapy interventions were performed at a separate and distinct time from the therapeutic activities interventions.      Billed With/As:   [x] TE   [] TA   [] Neuro   [] Self Care Patient Education: [x] Review HEP    [x] Progressed/Changed HEP based on:   [x] positioning   [x] body mechanics   [] transfers   [] heat/ice application    [] other:      Other Objective/Functional Measures:  See PN   Post Treatment Pain Level (on 0 to 10) scale:   0  / 10     ASSESSMENT  Assessment/Changes in Function:     See PN      []  See Progress Note/Recertification   Patient will continue to benefit from skilled PT services to modify and progress therapeutic interventions, address functional mobility deficits, address ROM deficits, address strength deficits, analyze and address soft tissue restrictions, analyze and cue movement patterns, analyze and modify body mechanics/ergonomics, assess and modify postural abnormalities and instruct in home and community integration to attain remaining goals.    Progress toward goals / Updated goals:    See pN        PLAN  [x]  Upgrade activities as tolerated yes Continue plan of care   []  Discharge due to :    []  Other:      Therapist: Demian Steele    Date: 1/17/2023 Time: 8:48 AM     Future Appointments   Date Time Provider Kelin Wilkins   1/19/2023  7:00 AM Milena Beltran [facility]   1/25/2023  7:30 AM Otis Hernandes [address] [place] [address] [facility]   2/1/2023  7:00 AM Otis [address]   2/8/2023  7:00 AM Otis Cameron [address]   2/15/2023  7:00 AM Cisco Alvarado [address]

## 2023-01-19 ENCOUNTER — HOSPITAL ENCOUNTER (OUTPATIENT)
Dept: PHYSICAL THERAPY | Age: 88
Discharge: HOME | End: 2023-01-19
Payer: MEDICARE

## 2023-01-19 PROCEDURE — 97110 THERAPEUTIC EXERCISES: CPT

## 2023-01-19 NOTE — PROGRESS NOTES
PHYSICAL THERAPY - DAILY TREATMENT NOTE    Patient Name: She Oakley        Date: 2023  : 1935   Yes Patient  Verified  Visit #:  12 of   +8  Insurance: Payor: [address],[facility] / Plan: [place] MED[place] PART A & B / Product Type: Medicare /      In time: 7:00 Out time: 744   Total Treatment Time: 44     Medicare/BCBS Time Tracking (below)   Total Timed Codes (min):  44 1:1 Treatment Time: 31     TREATMENT AREA =  Other low back pain [M54.59]    SUBJECTIVE  Pain Level (on 0 to 10 scale):  0  / 10   Medication Changes/New allergies or changes in medical history, any new surgeries or procedures?    no  If yes, update Summary List   Subjective Functional Status/Changes:  []  No changes reported     Notes feeling worn out yesterday following treatment session        OBJECTIVE  Modalities Rationale:     44  (bill 31) min Therapeutic Exercise:  [x]  See flow sheet   Rationale:      increase ROM and increase strength to improve the patients ability to tolerate prolonged standing      NT min Manual Therapy: S/L STM to ZENAIDA ITB and L/s Paraspinals, ZENAIDA piriformis TrP   Rationale:      decrease pain, increase ROM, increase tissue extensibility, and decrease trigger points to improve patient's ability to perform ADLs. The manual therapy interventions were performed at a separate and distinct time from the therapeutic activities interventions.      Billed With/As:   [x] TE   [] TA   [] Neuro   [] Self Care Patient Education: [x] Review HEP    [x] Progressed/Changed HEP based on:   [x] positioning   [x] body mechanics   [] transfers   [] heat/ice application    [] other:      Other Objective/Functional Measures:    Cuing for proper form with S/L hip ABD to ensure max benefit  R hip ER compensatory motion with mini band lateral walking - cues for proper form   Demonstrates wide stance with ZENAIDA hip ER on LP - cues for neutral feet and decr EFREM for emphasis on quad activation     Post Treatment Pain Level (on 0 to 10) scale:   0  / 10     ASSESSMENT  Assessment/Changes in Function:     Increased emphasis on form and proper mechanics/knee alignment with strengthening to decr compensatory motion and ensure proper mm activation. []  See Progress Note/Recertification   Patient will continue to benefit from skilled PT services to modify and progress therapeutic interventions, address functional mobility deficits, address ROM deficits, address strength deficits, analyze and address soft tissue restrictions, analyze and cue movement patterns, analyze and modify body mechanics/ergonomics, assess and modify postural abnormalities and instruct in home and community integration to attain remaining goals. Progress toward goals / Updated goals:    1) Pt will have improved control of R LE with eccentric step down, showing fair alignment of LE and control 1 sets of 10 reps on 4 inch box in order to have improved benjamin for repetitive stair navigation Goal in progress increased R knee valgus with tap down  2) Patient will report decreased c/o pain to < or = 5/10 at the worst to facilitate prolonged standing ADL's with manageable sx in L/S and ZENAIDA knees. 3) Increase FOTO to 67/100 indicating improved function and quality of life. 4) Patient to increase ZENAIDA gluteus medius strength to 3/5 in order to improve ability to tolerate prolonged standing.  Goal in progress lifts about 1/2 way against gravity for 15 repetitions        PLAN  [x]  Upgrade activities as tolerated yes Continue plan of care   []  Discharge due to :    []  Other:      Therapist: Armand Rodas    Date: 1/19/2023 Time: 8:48 AM     Future Appointments   Date Time Provider Kelin Wilkins   1/25/2023  7:30 AM Derral Butter [facility]   2/1/2023  7:00 AM Derral Butter [facility]   2/8/2023  7:00 AM Derral Butter [id number]   2/15/2023  7:00 AM Efren Alvarado [address]

## 2023-01-25 ENCOUNTER — HOSPITAL ENCOUNTER (OUTPATIENT)
Dept: PHYSICAL THERAPY | Age: 88
Discharge: HOME | End: 2023-01-25
Payer: MEDICARE

## 2023-01-25 PROCEDURE — 97110 THERAPEUTIC EXERCISES: CPT

## 2023-01-25 NOTE — PROGRESS NOTES
PHYSICAL THERAPY - DAILY TREATMENT NOTE    Patient Name: Seven Reed        Date: 2023  : 1935   Yes Patient  Verified  Visit #:  16 of   12+8  Insurance: Payor: [place] MEDICARE / Plan: [place] MEDICARE PART A & B / Product Type: Medicare /      In time: 7:30 Out time: 8:09   Total Treatment Time: 39     Medicare/BCBS Time Tracking (below)   Total Timed Codes (min):  39 1:1 Treatment Time: 39     TREATMENT AREA =  Other low back pain [M54.59]    SUBJECTIVE  Pain Level (on 0 to 10 scale):  0  / 10   Medication Changes/New allergies or changes in medical history, any new surgeries or procedures?    no  If yes, update Summary List   Subjective Functional Status/Changes:  []  No changes reported     Notes feeling worn out yesterday following treatment session        OBJECTIVE  Modalities Rationale:     39 min Therapeutic Exercise:  [x]  See flow sheet   Rationale:      increase ROM and increase strength to improve the patients ability to tolerate prolonged standing      NT min Manual Therapy: S/L STM to ZENAIDA ITB and L/s Paraspinals, ZENAIDA piriformis TrP   Rationale:      decrease pain, increase ROM, increase tissue extensibility, and decrease trigger points to improve patient's ability to perform ADLs. The manual therapy interventions were performed at a separate and distinct time from the therapeutic activities interventions.      Billed With/As:   [x] TE   [] TA   [] Neuro   [] Self Care Patient Education: [x] Review HEP    [x] Progressed/Changed HEP based on:   [x] positioning   [x] body mechanics   [] transfers   [] heat/ice application    [] other:      Other Objective/Functional Measures:    Continues to require cues with S/L  hip ABD for proper form and max benefit  Decr step length with green mini band during lateral monster walk   Added modified SL sit to stand from high      Post Treatment Pain Level (on 0 to 10) scale:   0  / 10     ASSESSMENT  Assessment/Changes in Function:     Continues to demonstrate difficulty with R LE step up on 8\" and would benefit from continued skilled services in order to address R knee weakness and valgus with eccentric lowering. []  See Progress Note/Recertification   Patient will continue to benefit from skilled PT services to modify and progress therapeutic interventions, address functional mobility deficits, address ROM deficits, address strength deficits, analyze and address soft tissue restrictions, analyze and cue movement patterns, analyze and modify body mechanics/ergonomics, assess and modify postural abnormalities and instruct in home and community integration to attain remaining goals. Progress toward goals / Updated goals:    1) Pt will have improved control of R LE with eccentric step down, showing fair alignment of LE and control 1 sets of 10 reps on 4 inch box in order to have improved benjamin for repetitive stair navigation Goal in progress increased R knee valgus with tap down  2) Patient will report decreased c/o pain to < or = 5/10 at the worst to facilitate prolonged standing ADL's with manageable sx in L/S and ZENAIDA knees. 3) Increase FOTO to 67/100 indicating improved function and quality of life. 4) Patient to increase ZENAIDA gluteus medius strength to 3/5 in order to improve ability to tolerate prolonged standing.  Goal in progress lifts about 1/2 way against gravity for 15 repetitions        PLAN  [x]  Upgrade activities as tolerated yes Continue plan of care   []  Discharge due to :    []  Other:      Therapist: Pinky Carballo    Date: 1/25/2023 Time: 8:48 AM     Future Appointments   Date Time Provider Kelin Wilkins   2/1/2023  7:00 AM [facility]   2/8/2023  7:00 AM Amanda [id number]   2/15/2023  7:00 AM Mara [address]

## 2023-02-01 ENCOUNTER — HOSPITAL ENCOUNTER (OUTPATIENT)
Dept: PHYSICAL THERAPY | Age: 88
Discharge: HOME | End: 2023-02-01
Payer: MEDICARE

## 2023-02-01 PROCEDURE — 97110 THERAPEUTIC EXERCISES: CPT

## 2023-02-01 NOTE — PROGRESS NOTES
PHYSICAL THERAPY - DAILY TREATMENT NOTE    Patient Name: Ciro Preston        Date: 2023  : 1935   Yes Patient  Verified  Visit #:  25 of   12+8  Insurance: Payor: [place] MEDICARE / Plan: VA MEDICARE PART A & B / Product Type: Medicare /      In time: 128 Out time: 7:39   Total Treatment Time: 42     Medicare/BCBS Time Tracking (below)   Total Timed Codes (min): 42 1:1 Treatment Time: 42     TREATMENT AREA =  Other low back pain [M54.59]    SUBJECTIVE  Pain Level (on 0 to 10 scale):  0  / 10   Medication Changes/New allergies or changes in medical history, any new surgeries or procedures?    no  If yes, update Summary List   Subjective Functional Status/Changes:  []  No changes reported     No new complaints        OBJECTIVE  Modalities Rationale:     42 min Therapeutic Exercise:  [x]  See flow sheet   Rationale:      increase ROM and increase strength to improve the patients ability to tolerate prolonged standing      NT min Manual Therapy: S/L STM to ZENAIDA ITB and L/s Paraspinals, ZENAIDA piriformis TrP   Rationale:      decrease pain, increase ROM, increase tissue extensibility, and decrease trigger points to improve patient's ability to perform ADLs. The manual therapy interventions were performed at a separate and distinct time from the therapeutic activities interventions. Billed With/As:   [x] TE   [] TA   [] Neuro   [] Self Care Patient Education: [x] Review HEP    [x] Progressed/Changed HEP based on:   [x] positioning   [x] body mechanics   [] transfers   [] heat/ice application    [] other:      Other Objective/Functional Measures:    Progressed LP resistance with incr challenge - continues to demonstrate increased R Knee valgus motion with lowering from LP   Added RDL     Post Treatment Pain Level (on 0 to 10) scale:   0  / 10     ASSESSMENT  Assessment/Changes in Function:     Advanced glute and posterior chain strengthening with increased challenge.  Max cuing for proper RDL form to ensure proper mm recruitment and to ensure decreased strain on L/S.      []  See Progress Note/Recertification   Patient will continue to benefit from skilled PT services to modify and progress therapeutic interventions, address functional mobility deficits, address ROM deficits, address strength deficits, analyze and address soft tissue restrictions, analyze and cue movement patterns, analyze and modify body mechanics/ergonomics, assess and modify postural abnormalities and instruct in home and community integration to attain remaining goals. Progress toward goals / Updated goals:    1) Pt will have improved control of R LE with eccentric step down, showing fair alignment of LE and control 1 sets of 10 reps on 4 inch box in order to have improved benjamin for repetitive stair navigation Goal in progress increased R knee valgus with tap down  2) Patient will report decreased c/o pain to < or = 5/10 at the worst to facilitate prolonged standing ADL's with manageable sx in L/S and ZENAIDA knees. 3) Increase FOTO to 67/100 indicating improved function and quality of life. 4) Patient to increase ZENAIDA gluteus medius strength to 3/5 in order to improve ability to tolerate prolonged standing.  Goal in progress lifts about 1/2 way against gravity for 15 repetitions        PLAN  [x]  Upgrade activities as tolerated yes Continue plan of care   []  Discharge due to :    []  Other:      Therapist: Sheyla Hogan    Date: 2/1/2023 Time: 8:48 AM     Future Appointments   Date Time Provider Kelin Wilkins   2/8/2023  7:00 AM Stephannie Salon [facility]   [facility]15[facility]2023  7[id number]

## 2023-02-08 ENCOUNTER — HOSPITAL ENCOUNTER (OUTPATIENT)
Dept: PHYSICAL THERAPY | Age: 88
Discharge: HOME | End: 2023-02-08
Payer: MEDICARE

## 2023-02-08 PROCEDURE — 97110 THERAPEUTIC EXERCISES: CPT

## 2023-02-08 NOTE — PROGRESS NOTES
PHYSICAL THERAPY - DAILY TREATMENT NOTE    Patient Name: Jayson Quiroz        Date: 2023  : 1935   Yes Patient  Verified  Visit #:  8  Insurance: Payor: [place] MEDICARE / Plan: VA MEDICARE PART A & B / Product Type: Medicare /      In time: 6:59 Out time: 745   Total Treatment Time: 46     Medicare/BCBS Time Tracking (below)   Total Timed Codes (min):  46 1:1 Treatment Time: 24     TREATMENT AREA =  Other low back pain [M54.59]    SUBJECTIVE  Pain Level (on 0 to 10 scale):  0  / 10   Medication Changes/New allergies or changes in medical history, any new surgeries or procedures?    no  If yes, update Summary List   Subjective Functional Status/Changes:  []  No changes reported     Been doing a balance board that her daughter has at home          OBJECTIVE  Modalities Rationale:     46 (bill 24) min Therapeutic Exercise:  [x]  See flow sheet   Rationale:      increase ROM and increase strength to improve the patients ability to tolerate prolonged standing      NT min Manual Therapy: S/L STM to ZENAIDA ITB and L/s Paraspinals, ZENAIDA piriformis TrP   Rationale:      decrease pain, increase ROM, increase tissue extensibility, and decrease trigger points to improve patient's ability to perform ADLs. The manual therapy interventions were performed at a separate and distinct time from the therapeutic activities interventions.      Billed With/As:   [x] TE   [] TA   [] Neuro   [] Self Care Patient Education: [x] Review HEP    [x] Progressed/Changed HEP based on:   [x] positioning   [x] body mechanics   [] transfers   [] heat/ice application    [] other:      Other Objective/Functional Measures:    Cuing ith tap down for light use of UEs and only tapping the heel versus placing the whole foot down  Patient demonstrates ZENAIDA hip ER with LP - patient educated on greater emphasis on posterior chain and compensatory motions to avoid using rectus femoris     Post Treatment Pain Level (on 0 to 10) scale:   0 / 10     ASSESSMENT  Assessment/Changes in Function:     Patient has progressed well with PT services consistently reporting improved sx and functional mobility, thus patient is appropriate for D/C to home mgt of sx next visit. Discussed discharge planning, with patient reported agreeance. []  See Progress Note/Recertification   Patient will continue to benefit from skilled PT services to modify and progress therapeutic interventions, address functional mobility deficits, address ROM deficits, address strength deficits, analyze and address soft tissue restrictions, analyze and cue movement patterns, analyze and modify body mechanics/ergonomics, assess and modify postural abnormalities and instruct in home and community integration to attain remaining goals.    Progress toward goals / Updated goals:    Progressing towards DC NV       PLAN  [x]  Upgrade activities as tolerated yes Continue plan of care   []  Discharge due to :    []  Other:      Therapist: Clair Varela    Date: 2/8/2023 Time: 8:48 AM     Future Appointments   Date Time Provider Kelin Wilkins   2/15/2023  7:00 AM Rut Davies

## 2023-02-15 ENCOUNTER — APPOINTMENT (OUTPATIENT)
Dept: PHYSICAL THERAPY | Age: 88
End: 2023-02-15
Payer: MEDICARE

## 2023-02-15 ENCOUNTER — APPOINTMENT (OUTPATIENT)
Facility: HOSPITAL | Age: 88
End: 2023-02-15
Payer: MEDICARE

## 2023-02-28 ENCOUNTER — HOSPITAL ENCOUNTER (OUTPATIENT)
Facility: HOSPITAL | Age: 88
Setting detail: RECURRING SERIES
Discharge: HOME | End: 2023-03-03
Payer: MEDICARE

## 2023-02-28 PROCEDURE — 97110 THERAPEUTIC EXERCISES: CPT

## 2023-02-28 NOTE — PROGRESS NOTES
PHYSICAL / OCCUPATIONAL THERAPY - DAILY TREATMENT NOTE (updated )    Patient Name: Mitul Salcido    Date: 2023    : 1935  Insurance: Payor: MEDICARE / Plan: MEDICARE PART A AND B / Product Type: *No Product type* /      Patient  verified yes     Visit #   Current / Total 20 28   Time   In / Out [id number]   Pain   In / Out 0 0   Subjective Functional Status/Changes: I have to improvise at the gym   Changes to:  Meds, Allergies, Med Hx, Sx Hx? If yes, update Summary List no       TREATMENT AREA =  Other low back pain [M54.59]    OBJECTIVE        Therapeutic Procedures: Tx Min Billable or 1:1 Min (if diff from Tx Min) Procedure, Rationale, Specifics   [phone] [id number] Therapeutic Exercise (timed):  increase ROM, strength, coordination, balance, and proprioception to improve patient's ability to progress to PLOF and address remaining functional goals. (see flow sheet as applicable)     Details if applicable:         00006 Gait Training (timed):  __30_ feet without assisitve device on level surfaces with _SBA__ level of assistance while performing HHT/VHT, EC, retro, and tandem to improve safety and dynamic movement with household/community ambulation.   (see flow sheet as applicable)      29 40 [facility] Totals Reminder: bill using total billable min of TIMED therapeutic procedures (example: do not include dry needle or estim unattended, both untimed codes, in totals to left)  8-22 min = 1 unit; 23-37 min = 2 units; 38-52 min = 3 units; 53-67 min = 4 units; 68-82 min = 5 units   Total Total     [x]  Patient Education billed concurrently with other procedures   [x] Review HEP    [] Progressed/Changed HEP, detail:    [] Other detail:       Objective Information/Functional Measures/Assessment    See DC    Patient will continue to benefit from skilled PT / OT services to modify and progress therapeutic interventions, analyze and address functional mobility deficits, analyze and address ROM deficits, analyze and address strength deficits, analyze and address soft tissue restrictions, analyze and cue for proper movement patterns, analyze and modify for postural abnormalities, analyze and address imbalance/dizziness, and instruct in home and community integration to address functional deficits and attain remaining goals.     Progress toward goals / Updated goals:  [x]  See DC    See DC    PLAN  yes Continue plan of care  []  Upgrade activities as tolerated  [x]  Discharge due to : Progressing towards goals  []  Other:    Claudia Ayala, PT    2/28/2023    6:52 AM    Future Appointments   Date Time Provider Meenakshi Gonzalez   2/28/2023  7:30 AM Claudia Ayala, PT [id number]

## 2023-02-28 NOTE — DISCHARGE SUMMARY
[address],[facility], [facility] - Ph: [phone]  Fx: [phone]  [address] [place] [address] PHYSICAL THERAPY          Patient Name: Katie Gardner : 1935   Treatment/Medical Diagnosis: Other low back pain [M54.59]   Onset Date: Chronic    Referral Source: Ollie Crane, * Start of Care [facility]): 23   Prior Hospitalization: See Medical History Provider #: [id number]   Prior Level of Function: I in all activities; able to amb >5 mi   Comorbidities: Latex Allergy, HTN, DM, Scoliosis    Medications: Verified on Patient Summary List   Vis[facility] from [facility]: 20 Missed Visits: 0     Key Functional Changes/Progress: The pt has progressed well with therapy, consistently reporting decreased pain and increased functional ability. Pt reports 60% improvement in TIMO Knee and L/S sx since initiating PT services. Patient's self reported Global Rating of Change (GROC) score is (+4) moderately better. Average knee pain 1/10 and 9/10 at the worst. Functional improvements include transferring from sit to stand and tolerance for prolonged standing. Functional impairments include kneeling and stooping down. Based on progress from PT services and pt reported improvement, patient is appropriate for DC to home mgt of sx at this time. Pt will have improved control of R LE with eccentric step down, showing fair alignment of LE and control 1 sets of 10 reps on 4 inch box in order to have improved chance for repetitive stair navigation  Status at last Eval: Goal established  Current Status: R LE tap down off 4\" with fair to poor alignment   Goal Met? Progressing    2. Patient will report decreased c/o pain to < or = 5/10 at the worst to facilitate prolonged standing ADL's with manageable sx in L/S and TIMO knees. Status at last Eval: 9/10 pain   Current Status: 9/10 pain   Goal Met?  no    3.  Increase FOTO to 67/100 indicating improved function and quality of life. Status at last Eval: FOTO = 57/100  Current Status: FOTO = 57/100  Goal Met?  no    4. Patient to increase TIMO gluteus medius strength to 3/5 in order to improve ability to tolerate prolonged standing  Status at last Eval: Goal established  Current Status: R/L Glute Med MMT = 3+/3+  Goal Met?  yes    RECOMMENDATIONS  Discontinue therapy. Progressing towards or have reached established goals. If you have any questions/comments please contact us directly at [phone]. Thank you for allowing us to assist in the care of your patient.     Hayley, PT       2/28/2023       6:53 AM

## 2023-06-14 ENCOUNTER — APPOINTMENT (OUTPATIENT)
Facility: HOSPITAL | Age: 88
End: 2023-06-14
Payer: MEDICARE

## 2023-06-19 ENCOUNTER — HOSPITAL ENCOUNTER (OUTPATIENT)
Facility: HOSPITAL | Age: 88
Setting detail: RECURRING SERIES
Discharge: HOME | End: 2023-06-22
Payer: MEDICARE

## 2023-06-19 PROCEDURE — 97110 THERAPEUTIC EXERCISES: CPT

## 2023-06-19 PROCEDURE — 97162 PT EVAL MOD COMPLEX 30 MIN: CPT

## 2023-06-19 NOTE — THERAPY EVALUATION
Management, [id number] Electrical Stim unattended, [id number] Electrical Stim attended, 56682 Vasopneumatic Device, [id number] Aquatic Therapy, [id number] Gait Training, [id number] Ultrasound, [id number] Mechanical Traction, [id number] Canalith Repositioning, [id number] Orthotic Management and Training, and Other:   Patient / Family readiness to learn indicated by: asking questions, trying to perform skills, interest, return verbalization , and return demonstration   Persons(s) to be included in education: patient (P)  Barriers to Learning/Limitations: none  Measures taken if barriers to learning present: -  Patient Goal (s): \"strengthen back\"  Patient Self Reported Health Status: good  Rehabilitation Potential: good    Short Term Goals: To be accomplished in 3 weeks  Pt to report adherence and demonstrate compliance with basic HEP to allow progress between visits  Status at last Eval: Initiated  Current Status: -  Goal Met?  -  2. Pt to report pain <2/10 at worst to allow improved function and QOL  Status at last Eval: 5/10  Current Status: -  Goal Met?  -  3. Pt to report stooping to the ground without pain to allow improved healing and QOL  Status at last Eval: Pain and difficulty rising  Current Status: -  Goal Met?  -    Long Term Goals: To be accomplished in 6 weeks  Pt to improve FOTO score to 62/100 indicating improved function in daily tasks  Status at last Eval: 58/100  Current Status: -  Goal Met?  -  2. Pt to indicate a Global Rating Of Change (GROC) of 4+ indicating \"moderately better\"  Status at last Eval: n/a  Current Status: -  Goal Met?  -  3. Pt to improve 5xSTS to hands-on-knees or less, to <19 sec to indicate improved independence in home. Status at last Eval: Bilat Ues off table, 23 sec  Current Status: -  Goal Met? -  4.  Pt to deny LE symptoms over the past 3 weeks indicating resolution of current status  Status at last Eval: \"not happening recently\"  Current Status: -  Goal Met?  -    Frequency / Duration: Patient to be seen 2

## 2023-06-19 NOTE — PROGRESS NOTES
PHYSICAL THERAPY - DAILY TREATMENT NOTE    Patient Name: Joana San    Date: 2023    : 1935  Insurance: Payor: MEDICARE / Plan: MEDICARE PART A AND B / Product Type: *No Product type* /      Patient  verified YES   Visit #   Current / Total 1 12   Time   In / Out 8:20 9:00   Pain   In / Out 0 0   Subjective Functional Status/Changes: SEE EVALUATION     TREATMENT AREA =  M54.59  OTHER LOWER BACK PAIN     OBJECTIVE        Therapeutic Procedures: Tx Min Billable or 1:1 Min (if diff from Tx Min) Procedure, Rationale, Specifics   15  [id number] Therapeutic Exercise (timed):  increase ROM, strength, coordination, balance, and proprioception to improve patient's ability to progress to PLOF and address remaining functional goals.  (see flow sheet as applicable)     Details if applicable:              Details if applicable:            Details if applicable:            Details if applicable:            Details if applicable:     13 40 [facility] Totals Reminder: bill using total billable min of TIMED therapeutic procedures (example: do not include dry needle or estim unattended, both untimed codes, in totals to left)  8-22 min = 1 unit; 23-37 min = 2 units; 38-52 min = 3 units; 53-67 min = 4 units; 68-82 min = 5 units   Total Total     [x]  Patient Education billed concurrently with other procedures   [x] Review HEP    [] Progressed/Changed HEP, detail:    [] Other detail:       Objective Information/Functional Measures/Assessment    SEE EVALUATION    Patient will benefit from skilled PT services to address functional deficits and attain remaining goals as set in EVALUATION    Progress toward goals / Updated goals:  []  See Progress Note/Recertification    SEE EVAL    PLAN  YES Continue plan of care  [x]  Upgrade activities as tolerated  []  Discharge due to :  []  Other:    Mike Ahumada, PT DPT, OCS   2023    7:45 AM    Future Appointments   Date Time Provider Meenakshi Gonzalez   2023  8:20 AM

## 2023-06-23 ENCOUNTER — HOSPITAL ENCOUNTER (OUTPATIENT)
Facility: HOSPITAL | Age: 88
Setting detail: RECURRING SERIES
Discharge: HOME | End: 2023-06-26
Payer: MEDICARE

## 2023-06-23 PROCEDURE — 97110 THERAPEUTIC EXERCISES: CPT

## 2023-06-23 PROCEDURE — 97530 THERAPEUTIC ACTIVITIES: CPT

## 2023-06-23 PROCEDURE — 97112 NEUROMUSCULAR REEDUCATION: CPT

## 2023-07-14 ENCOUNTER — HOSPITAL ENCOUNTER (OUTPATIENT)
Facility: HOSPITAL | Age: 88
Setting detail: RECURRING SERIES
Discharge: HOME | End: 2023-07-17
Payer: MEDICARE

## 2023-07-14 PROCEDURE — 97112 NEUROMUSCULAR REEDUCATION: CPT

## 2023-07-14 PROCEDURE — 97110 THERAPEUTIC EXERCISES: CPT

## 2023-07-14 PROCEDURE — 97530 THERAPEUTIC ACTIVITIES: CPT

## 2023-07-19 ENCOUNTER — HOSPITAL ENCOUNTER (OUTPATIENT)
Facility: HOSPITAL | Age: 88
Setting detail: RECURRING SERIES
Discharge: HOME | End: 2023-07-22
Payer: MEDICARE

## 2023-07-19 PROCEDURE — 97140 MANUAL THERAPY 1/> REGIONS: CPT

## 2023-07-19 PROCEDURE — 97530 THERAPEUTIC ACTIVITIES: CPT

## 2023-07-19 PROCEDURE — 97110 THERAPEUTIC EXERCISES: CPT

## 2023-07-19 NOTE — THERAPY RECERTIFICATION
[facility] THERAPY  [address], [address],[place] [facility] - Ph: [phone]  Fx: [phone]  PHYSICAL THERAPY PROGRESS NOTE  Patient Name: Ricke Mcburney : 1935   Treatment/Medical Diagnosis: Other low back pain [M54.59]  Radiculopathy, lumbar region [M54.16] / M54.59  OTHER LOWER BACK PAIN    Referral Source: Ricco Scales MD     Date of Initial Visit: 23 Attended Visits: 4 Missed Visits: 0     SUMMARY OF TREATMENT  Pt seen in clinic for to address Other low back pain [M54.59]  Radiculopathy, lumbar region [M54.16]. Pt has been assessed, completed therapeutic exercises, neuromuscular re-ed, therapeutic functional activity, received manual therapy intervention, self-care strategies, HEP techniques and pt ed on condition consistency and follow-through. -    CURRENT STATUS  Pt seen for 4 visits to address low back pain. Pt has made improvements in pain reporting (avg minimal pain, intermittent flare ups remain). Pt able to complete bending, walking and stooping in clinic. Primary limitations in function and balance seem to be LE strength and knee pain related at this time. Will continue to improve stiffness complaints with this patient per original POC. Short Term Goals: To be accomplished in 3 weeks  Pt to report adherence and demonstrate compliance with basic HEP to allow progress between visits  Status at last Eval: Initiated  Current Status: - trying to perform correctly   Goal Met? Variable (23)  2. Pt to report pain <2/10 at worst to allow improved function and QOL  Status at last Eval: 5/10  Current Status: Short sharp pain in the right leg 10/10  Goal Met? Not met (23)  3.  Pt to report stooping to the ground without pain to allow improved healing and QOL  Status at last Eval: Pain and difficulty rising  Current Status: Demonstrated floor to stand with use of chair to rise, mostly knee pain, no LBP

## 2023-07-19 NOTE — PROGRESS NOTES
Rating Of Change (GROC) of 4+ indicating \"moderately better\"  Status at last Eval: n/a  Current Status: -  Goal Met?  -  3. Pt to improve 5xSTS to hands-on-knees or less, to <19 sec to indicate improved independence in home. Status at last Eval: Bilat Ues off table, 23 sec  Current Status: -  Goal Met? -  4. Pt to deny LE symptoms over the past 3 weeks indicating resolution of current status  Status at last Eval: \"not happening recently\"  Current Status: Not Met  Goal Met?   Not Met    PLAN  Yes  Continue plan of care  [x]  Upgrade activities as tolerated  []  Discharge due to :  []  Other:    Erik Bernal, PT    7/19/2023    7:00 AM    Future Appointments   Date Time Provider 4600  [facility]   7/19/2023  9:00 AM Erik Bernal, PT [facility]   7/21/2023  7:40 AM Missy Ramos, PT [facility]   7/24/2023  7:00 AM Erik Bernal, PT [facility]   7/26/2023  7:40 AM Erik Bernal, PT [facility]   7/31/2023  7:00 AM Erik Bernal, PT [address]

## 2023-07-21 ENCOUNTER — HOSPITAL ENCOUNTER (OUTPATIENT)
Facility: HOSPITAL | Age: 88
Setting detail: RECURRING SERIES
Discharge: HOME | End: 2023-07-24
Payer: MEDICARE

## 2023-07-21 PROCEDURE — 97530 THERAPEUTIC ACTIVITIES: CPT

## 2023-07-21 PROCEDURE — 97140 MANUAL THERAPY 1/> REGIONS: CPT

## 2023-07-21 PROCEDURE — 97110 THERAPEUTIC EXERCISES: CPT

## 2023-07-21 NOTE — PROGRESS NOTES
the past 3 weeks indicating resolution of current status  Status at last Eval: \"not happening recently\"  Current Status: denies incr in symptoms past few weeks PROGRESSING 7/21/23  Goal Met?   Not Met    PLAN  Yes  Continue plan of care  [x]  Upgrade activities as tolerated  []  Discharge due to :  []  Other:    Dawn Elizondo, PT    7/21/2023    7:34 AM    Future Appointments   Date Time Provider [address]   7/21/2023  7:40 AM Dawn Elizondo, PT [address]   7/24/2023  7:00 AM Maris Jordy, PT [address]   7/26/2023  7:40 AM Maris Jordy, PT [address]   7/31/2023  7:00 AM Maris Jordy, PT [address] [facility]

## 2023-07-24 ENCOUNTER — HOSPITAL ENCOUNTER (OUTPATIENT)
Facility: HOSPITAL | Age: 88
Setting detail: RECURRING SERIES
Discharge: HOME | End: 2023-07-27
Payer: MEDICARE

## 2023-07-24 PROCEDURE — 97530 THERAPEUTIC ACTIVITIES: CPT

## 2023-07-24 PROCEDURE — 97110 THERAPEUTIC EXERCISES: CPT

## 2023-07-24 NOTE — PROGRESS NOTES
PROGRESSING 7/21/23  Goal Met?   Not Met    PLAN  Yes  Continue plan of care  [x]  Upgrade activities as tolerated  []  Discharge due to :  []  Other:    Maris Spence, PT    7/24/2023    7:01 AM    Future Appointments   Date Time Provider 0797  46[facility]   7/26/2023  7:40 AM Maris Spence, PT [facility]   7/31/2023  7:00 AM Maris Spence, PT [address] [facility]

## 2023-07-26 ENCOUNTER — HOSPITAL ENCOUNTER (OUTPATIENT)
Facility: HOSPITAL | Age: 88
Setting detail: RECURRING SERIES
Discharge: HOME | End: 2023-07-29
Payer: MEDICARE

## 2023-07-26 PROCEDURE — 97530 THERAPEUTIC ACTIVITIES: CPT

## 2023-07-26 PROCEDURE — 97110 THERAPEUTIC EXERCISES: CPT

## 2023-07-26 NOTE — PROGRESS NOTES
PHYSICAL / OCCUPATIONAL THERAPY - DAILY TREATMENT NOTE (updated )    Patient Name: Karen Kim    Date: 2023    : 1935  Insurance: Payor: MEDICARE / Plan: MEDICARE PART A AND B / Product Type: *No Product type* /      Patient  verified Yes     Visit #   Current / Total 7 12   Time   In / Out 740 820   Pain   In / Out 0/10 0/10   Subjective Functional Status/Changes: Feeling good overall with PT, \"but I've got more to go\"       TREATMENT AREA =  Other low back pain [M54.59]  Radiculopathy, lumbar region [M54.16]    OBJECTIVE      Therapeutic Procedures: Tx Min Billable or 1:1 Min (if diff from Tx Min) Procedure, Rationale, Specifics   15 15 [id number] Therapeutic Exercise (timed):  increase ROM, strength, coordination, balance, and proprioception to improve patient's ability to progress to PLOF and address remaining functional goals. (see flow sheet as applicable)     Details if applicable:       []   assessing strength/ROM  []   assessing activity performance (ex: sit to stand, stair negotiation)  []   assessing balance/control (ex. Stubbs, DGI, SLS)   - 94292 Neuromuscular Re-Education (timed):  improve balance, coordination, kinesthetic sense, posture, core stability and proprioception to improve patient's ability to develop conscious control of individual muscles and awareness of position of extremities in order to progress to PLOF and address remaining functional goals. (see flow sheet as applicable)     Details if applicable:    Edu on how to preform sit stand at home with proper form and control     []   assessing strength/ROM  []   assessing activity performance (ex: sit to stand, stair negotiation)  []   assessing balance/control (ex.  Stubbs, DGI, SLS)   77 17 93957 Therapeutic Activity (timed):  use of dynamic activities replicating functional movements to increase ROM, strength, coordination, balance, and proprioception in order to improve patient's ability to progress to PLOF and

## 2023-07-31 ENCOUNTER — HOSPITAL ENCOUNTER (OUTPATIENT)
Facility: HOSPITAL | Age: 88
Setting detail: RECURRING SERIES
Discharge: HOME | End: 2023-08-03
Payer: MEDICARE

## 2023-07-31 PROCEDURE — 97530 THERAPEUTIC ACTIVITIES: CPT

## 2023-07-31 PROCEDURE — 97110 THERAPEUTIC EXERCISES: CPT

## 2023-07-31 NOTE — PROGRESS NOTES
PHYSICAL / OCCUPATIONAL THERAPY - DAILY TREATMENT NOTE (updated )    Patient Name: Eva Keller    Date: 2023    : 1935  Insurance: Payor: MEDICARE / Plan: MEDICARE PART A AND B / Product Type: *No Product type* /      Patient  verified Yes     Visit #   Current / Total 8 12   Time   In / Out 7:00 7:40   Pain   In / Out 0/10 010   Subjective Functional Status/Changes:   Says she was able to kneel to the ground over the weekend and didn't have too much trouble with it       TREATMENT AREA =  Other low back pain [M54.59]  Radiculopathy, lumbar region [M54.16]    OBJECTIVE      Therapeutic Procedures: Tx Min Billable or 1:1 Min (if diff from Tx Min) Procedure, Rationale, Specifics   10  16083 Therapeutic Exercise (timed):  increase ROM, strength, coordination, balance, and proprioception to improve patient's ability to progress to PLOF and address remaining functional goals. (see flow sheet as applicable)     Details if applicable:       []   assessing strength/ROM  []   assessing activity performance (ex: sit to stand, stair negotiation)  []   assessing balance/control (ex. Stubbs, DGI, SLS)   -  01667 Neuromuscular Re-Education (timed):  improve balance, coordination, kinesthetic sense, posture, core stability and proprioception to improve patient's ability to develop conscious control of individual muscles and awareness of position of extremities in order to progress to PLOF and address remaining functional goals. (see flow sheet as applicable)     Details if applicable:    Edu on how to preform sit stand at home with proper form and control     []   assessing strength/ROM  []   assessing activity performance (ex: sit to stand, stair negotiation)  []   assessing balance/control (ex.  Stubbs, DGI, SLS)   30  65172 Therapeutic Activity (timed):  use of dynamic activities replicating functional movements to increase ROM, strength, coordination, balance, and proprioception in order to improve

## 2023-08-04 ENCOUNTER — HOSPITAL ENCOUNTER (OUTPATIENT)
Facility: HOSPITAL | Age: 88
Setting detail: RECURRING SERIES
Discharge: HOME | End: 2023-08-07
Payer: MEDICARE

## 2023-08-04 PROCEDURE — 97530 THERAPEUTIC ACTIVITIES: CPT

## 2023-08-04 PROCEDURE — 97110 THERAPEUTIC EXERCISES: CPT

## 2023-08-04 NOTE — PROGRESS NOTES
continue to benefit from skilled PT / OT services to modify and progress therapeutic interventions, analyze and address functional mobility deficits, analyze and address ROM deficits, analyze and address strength deficits, analyze and address soft tissue restrictions, analyze and cue for proper movement patterns, analyze and modify for postural abnormalities, and instruct in home and community integration to address functional deficits and attain remaining goals. Progress toward goals / Updated goals:  []  See Progress Note/Recertification    Short Term Goals: To be accomplished in 3 weeks  Pt to report adherence and demonstrate compliance with basic HEP to allow progress between visits  Status at last Eval: Initiated  Current Status: - trying to perform correctly   Goal Met? Variable (7/19/23)  2. Pt to report pain <2/10 at worst to allow improved function and QOL  Status at last Eval: 5/10  Current Status: denies pain today 7/21/23  Goal Met? Not met (7/19/23)  3. Pt to report stooping to the ground without pain to allow improved healing and QOL  Status at last Eval: Pain and difficulty rising  Current Status: Demonstrated floor to stand with use of chair to rise, mostly knee pain, no LBP reported  Goal Met? Progress is seen, pt demo's this in clinic yet avoids at home (7/19/23)    Long Term Goals: To be accomplished in 6 weeks  Pt to improve FOTO score to 62/100 indicating improved function in daily tasks  Status at last Eval: 58/100  Current Status: -  Goal Met?  -  2. Pt to indicate a Global Rating Of Change (GROC) of 4+ indicating \"moderately better\"  Status at last Eval: n/a  Current Status: -  Goal Met?  -  3. Pt to improve 5xSTS to hands-on-knees or less, to <19 sec to indicate improved independence in home. Status at last Eval: Bilat Ues off table, 23 sec  Current Status: -  Goal Met? -  4.  Pt to deny LE symptoms over the past 3 weeks indicating resolution of current status  Status at last Eval:

## 2023-08-10 ENCOUNTER — APPOINTMENT (OUTPATIENT)
Facility: HOSPITAL | Age: 88
End: 2023-08-10
Payer: MEDICARE

## 2023-08-11 ENCOUNTER — HOSPITAL ENCOUNTER (OUTPATIENT)
Facility: HOSPITAL | Age: 88
Setting detail: RECURRING SERIES
Discharge: HOME | End: 2023-08-14
Payer: MEDICARE

## 2023-08-11 PROCEDURE — 97110 THERAPEUTIC EXERCISES: CPT

## 2023-08-11 PROCEDURE — 97530 THERAPEUTIC ACTIVITIES: CPT

## 2023-08-11 NOTE — PROGRESS NOTES
PHYSICAL / OCCUPATIONAL THERAPY - DAILY TREATMENT NOTE (updated )    Patient Name: Lucy Sweet    Date: 2023    : 1935  Insurance: Payor: MEDICARE / Plan: MEDICARE PART A AND B / Product Type: *No Product type* /      Patient  verified Yes     Visit #   Current / Total 10 12   Time   In / Out 7:00 7:40   Pain   In / Out 0/10 0/10   Subjective Functional Status/Changes: The back has been feeling good. I had a knee injection and want to do PT for the knees through Dr Stanislaw Acosta. TREATMENT AREA =  Other low back pain [M54.59]  Radiculopathy, lumbar region [M54.16]    OBJECTIVE      Therapeutic Procedures: Tx Min Billable or 1:1 Min (if diff from Tx Min) Procedure, Rationale, Specifics   30  41929 Therapeutic Exercise (timed):  increase ROM, strength, coordination, balance, and proprioception to improve patient's ability to progress to PLOF and address remaining functional goals. (see flow sheet as applicable)     Details if applicable:       []   assessing strength/ROM  []   assessing activity performance (ex: sit to stand, stair negotiation)  []   assessing balance/control (ex. Stubbs, DGI, SLS)   -  56842 Neuromuscular Re-Education (timed):  improve balance, coordination, kinesthetic sense, posture, core stability and proprioception to improve patient's ability to develop conscious control of individual muscles and awareness of position of extremities in order to progress to PLOF and address remaining functional goals. (see flow sheet as applicable)     Details if applicable:    Edu on how to preform sit stand at home with proper form and control     []   assessing strength/ROM  []   assessing activity performance (ex: sit to stand, stair negotiation)  []   assessing balance/control (ex.  Stubbs, DGI, SLS)   10  [id number] Therapeutic Activity (timed):  use of dynamic activities replicating functional movements to increase ROM, strength, coordination, balance, and proprioception in order to

## 2023-08-11 NOTE — THERAPY DISCHARGE
[address] [facility], [place] [address],[place] [facility] - Ph: [phone]  Fx: [phone]  [address] [place] [address] PHYSICAL THERAPY          Patient Name: Bethany Aggarwal : 1935   Treatment/Medical Diagnosis: Other low back pain [M54.59]  Radiculopathy, lumbar region [M54.16]   Onset Date: 10+ yrs    Referral Source: Tere Ferrari MD Start of Care [facility]): 23   Prior Hospitalization: See Medical History Provider #: [id number]   Prior Level of Function: Could bend over and ADL like laundry and cleaning w no symptoms   Comorbidities: Scoliosis, OA, osteoporosis, LBP, DM, GI disease, HTN, sleep dysfunction   Medications: Verified on Patient Summary List   [facility]: 10 Missed Visits: 0     Key Functional Changes/Progress: Pt seen for 10 visits to address LBP with significant improvements in function, pain control and QOL. Based on pt progress and current status I will close this case today. Pt given HEP and all questions answered. Short Term Goals: To be accomplished in 3 weeks  Pt to report adherence and demonstrate compliance with basic HEP to allow progress between visits  Status at last Eval: Initiated  Current Status: - trying to perform correctly   Goal Met? Variable (23)  2. Pt to report pain <2/10 at worst to allow improved function and QOL  Status at last Eval: 5/10  Current Status: No pains  Goal Met?  met (23)  3. Pt to report stooping to the ground without pain to allow improved healing and QOL  Status at last Eval: Pain and difficulty rising  Current Status: Demonstrated floor to stand with use of chair to rise, mostly knee pain, no LBP reported  Goal Met? Progress is seen, pt demo's this in clinic yet avoids at home (23)    Long Term Goals:  To be accomplished in 6 weeks  Pt to improve FOTO score to 62/100 indicating improved function in daily tasks  Status at last Eval: 58/100  Current Status:

## 2023-08-16 ENCOUNTER — APPOINTMENT (OUTPATIENT)
Facility: HOSPITAL | Age: 88
End: 2023-08-16
Payer: MEDICARE

## 2023-08-16 ENCOUNTER — HOSPITAL ENCOUNTER (OUTPATIENT)
Facility: HOSPITAL | Age: 88
Setting detail: RECURRING SERIES
Discharge: HOME | End: 2023-08-19
Payer: MEDICARE

## 2023-08-16 PROCEDURE — 97110 THERAPEUTIC EXERCISES: CPT

## 2023-08-16 PROCEDURE — 97530 THERAPEUTIC ACTIVITIES: CPT

## 2023-08-16 PROCEDURE — 97162 PT EVAL MOD COMPLEX 30 MIN: CPT

## 2023-08-16 NOTE — THERAPY EVALUATION
my care. I agree with the treatment plan and certify that this therapy is necessary. Physician's Signature:_________________________   DATE:_________   TIME:________                          Dr Banerjee Going    ** Signature, Date and Time must be completed for valid certification **  [facility].  Thank you

## 2023-08-16 NOTE — PROGRESS NOTES
PHYSICAL THERAPY - DAILY TREATMENT NOTE    Patient Name: Leo Estes    Date: 2023    : 1935  Insurance: Payor: MEDICARE / Plan: MEDICARE PART A AND B / Product Type: *No Product type* /      Patient  verified YES   Visit #   Current / Total 1 16   Time   In / Out 9:40 10:20   Pain   In / Out 0 0   Subjective Functional Status/Changes: SEE EVALUATION     TREATMENT AREA =  M25.561  RIGHT KNEE PAIN      OBJECTIVE        Therapeutic Procedures: Tx Min Billable or 1:1 Min (if diff from Tx Min) Procedure, Rationale, Specifics   15  46503 Therapeutic Exercise (timed):  increase ROM, strength, coordination, balance, and proprioception to improve patient's ability to progress to PLOF and address remaining functional goals. (see flow sheet as applicable)     Details if applicable:       15  13665 Therapeutic Activity (timed):  use of dynamic activities replicating functional movements to increase ROM, strength, coordination, balance, and proprioception in order to improve patient's ability to progress to PLOF and address remaining functional goals.   (see flow sheet as applicable)     Details if applicable:            Details if applicable:            Details if applicable:            Details if applicable:     27 40 [facility] Totals Reminder: bill using total billable min of TIMED therapeutic procedures (example: do not include dry needle or estim unattended, both untimed codes, in totals to left)  8-22 min = 1 unit; 23-37 min = 2 units; 38-52 min = 3 units; 53-67 min = 4 units; 68-82 min = 5 units   Total Total     [x]  Patient Education billed concurrently with other procedures   [x] Review HEP    [] Progressed/Changed HEP, detail:    [] Other detail:       Objective Information/Functional Measures/Assessment    SEE EVALUATION    Patient will benefit from skilled PT services to address functional deficits and attain remaining goals as set in EVALUATION    Progress toward goals / Updated goals:  []  See

## 2023-08-18 ENCOUNTER — HOSPITAL ENCOUNTER (OUTPATIENT)
Facility: HOSPITAL | Age: 88
Setting detail: RECURRING SERIES
Discharge: HOME | End: 2023-08-21
Payer: MEDICARE

## 2023-08-18 PROCEDURE — 97110 THERAPEUTIC EXERCISES: CPT

## 2023-08-18 PROCEDURE — 97530 THERAPEUTIC ACTIVITIES: CPT

## 2023-08-18 NOTE — PROGRESS NOTES
Raises with Unilateral Counter Support  - 1 x daily - 3 x weekly - 3 sets - 10 reps  - Standing Hip Flexion with Counter Support  - 1 x daily - 3 x weekly - 1 sets - 10 reps  - Standing Hip Abduction with Counter Support  - 1 x daily - 3 x weekly - 1 sets - 10 reps  - Standing Hip Extension with Counter Support  - 1 x daily - 3 x weekly - 1 sets - 10 reps  - Side Stepping with Resistance at Ankles  - 1 x daily - 3 x weekly - 1 sets - 20 reps  - Seated Long Arc Quad with Ankle Weight  - 1 x daily - 3 x weekly - 3 sets - 10 reps  - Standing Knee Flexion with Ankle Weight  - 1 x daily - 7 x weekly - 2 sets - 10 reps  - Sit to Stand  - 1 x daily - 7 x weekly - 2 sets - 10 reps  [] Other detail:       Objective Information/Functional Measures/Assessment    First follow-up for LE/s today. Focused on hip, knee and ankle strength. Created and elivered HEP. Noted R>L HS weakness in session. Drills made to address. Patient will continue to benefit from skilled PT services to modify and progress therapeutic interventions, analyze and address functional mobility deficits, analyze and address ROM deficits, analyze and address strength deficits, analyze and address soft tissue restrictions, analyze and cue for proper movement patterns, and analyze and modify for postural abnormalities to address functional deficits and attain remaining goals. Progress toward goals / Updated goals:  []  See Progress Note/Recertification    Short Term Goals: To be accomplished in 4 weeks  Pt to report adherence and demonstrate compliance with basic HEP to allow progress between visits  Status at last Eval: Initiated  Current Status: Expanded (8/18/23)  Goal Met?  -  2. Pt to report pain <3/10 at worst to allow improved function and QOL  Status at last Eval: 7/10  Current Status: -  Goal Met?  -  3.  Pt to report improved transfers from sit-stand 5x from typical height surface with hands on knees without deviation indicating improve

## 2023-08-21 ENCOUNTER — HOSPITAL ENCOUNTER (OUTPATIENT)
Facility: HOSPITAL | Age: 88
Setting detail: RECURRING SERIES
Discharge: HOME | End: 2023-08-24
Payer: MEDICARE

## 2023-08-21 PROCEDURE — 97530 THERAPEUTIC ACTIVITIES: CPT

## 2023-08-21 PROCEDURE — 97116 GAIT TRAINING THERAPY: CPT

## 2023-08-21 PROCEDURE — 97110 THERAPEUTIC EXERCISES: CPT

## 2023-08-21 NOTE — PROGRESS NOTES
PHYSICAL THERAPY - DAILY TREATMENT NOTE    Patient Name: Dillon Huggins    Date: 2023    : 1935  Insurance: Payor: MEDICARE / Plan: MEDICARE PART A AND B / Product Type: *No Product type* /      Patient  verified YES   Visit #   Current / Total 3 16   Time   In / Out 9:00 9:40   Pain   In / Out 0 0   Subjective Functional Status/Changes: Says she was sore in the hips after last session. TREATMENT AREA =  Other low back pain [M54.59]  Radiculopathy, lumbar region [M54.16]    OBJECTIVE        Therapeutic Procedures: Tx Min Billable or 1:1 Min (if diff from Tx Min) Procedure, Rationale, Specifics   17  48390 Therapeutic Exercise (timed):  increase ROM, strength, coordination, balance, and proprioception to improve patient's ability to progress to PLOF and address remaining functional goals. (see flow sheet as applicable)     Details if applicable:       15  32954 Therapeutic Activity (timed):  use of dynamic activities replicating functional movements to increase ROM, strength, coordination, balance, and proprioception in order to improve patient's ability to progress to PLOF and address remaining functional goals. (see flow sheet as applicable)     Details if applicable:     8   Gait Training 47685:  TM training at 2.2 mph with focus on step length and upright trunk    Details if applicable:            Details if applicable:            Details if applicable:     36  [facility] Totals Reminder: bill using total billable min of TIMED therapeutic procedures (example: do not include dry needle or estim unattended, both untimed codes, in totals to left)  8-22 min = 1 unit; 23-37 min = 2 units; 38-52 min = 3 units; 53-67 min = 4 units; 68-82 min = 5 units   Total Total     [x]  Patient Education billed concurrently with other procedures   [x] Review HEP    [] Progressed/Changed HEP, detail:  Access Code: [email or web address]  URL: [email or web address]  Date: 2023  Prepared by: Carline Gray

## 2023-08-23 ENCOUNTER — HOSPITAL ENCOUNTER (OUTPATIENT)
Facility: HOSPITAL | Age: 88
Setting detail: RECURRING SERIES
Discharge: HOME | End: 2023-08-26
Payer: MEDICARE

## 2023-08-23 PROCEDURE — 97530 THERAPEUTIC ACTIVITIES: CPT

## 2023-08-23 PROCEDURE — 97116 GAIT TRAINING THERAPY: CPT

## 2023-08-23 PROCEDURE — 97110 THERAPEUTIC EXERCISES: CPT

## 2023-08-23 NOTE — PROGRESS NOTES
PHYSICAL THERAPY - DAILY TREATMENT NOTE    Patient Name: Grisel Marshall    Date: 2023    : 1935  Insurance: Payor: MEDICARE / Plan: MEDICARE PART A AND B / Product Type: *No Product type* /      Patient  verified YES   Visit #   Current / Total 3 16   Time   In / Out 9:00 9:40   Pain   In / Out 0-1 0-1   Subjective Functional Status/Changes: Says she was sore in the hips after last session. TREATMENT AREA =  Other low back pain [M54.59]  Radiculopathy, lumbar region [M54.16]    OBJECTIVE        Therapeutic Procedures: Tx Min Billable or 1:1 Min (if diff from Tx Min) Procedure, Rationale, Specifics   10  19355 Therapeutic Exercise (timed):  increase ROM, strength, coordination, balance, and proprioception to improve patient's ability to progress to PLOF and address remaining functional goals. (see flow sheet as applicable)     Details if applicable:       10  09096 Therapeutic Activity (timed):  use of dynamic activities replicating functional movements to increase ROM, strength, coordination, balance, and proprioception in order to improve patient's ability to progress to PLOF and address remaining functional goals. (see flow sheet as applicable)     Details if applicable:     8   Gait Training 71414:  TM training at 2.2 mph with focus on step length and upright trunk    Details if applicable:     12 NB   59519 Manual Therapy (timed):  decrease pain, increase ROM, and increase tissue extensibility to improve patient's ability to progress to PLOF and address remaining functional goals. The manual therapy interventions were performed at a separate and distinct time from the therapeutic activities interventions . (see flow sheet as applicable)       Details if applicable:  Supine: Bilateral hip PROm for flex and rotation. Knee PROm flex ext. Light STM lateral hip/glute med and lateral vastus.            Details if applicable:     36  [address] Reminder: bill using total billable min of

## 2023-08-28 ENCOUNTER — HOSPITAL ENCOUNTER (OUTPATIENT)
Facility: HOSPITAL | Age: 88
Setting detail: RECURRING SERIES
Discharge: HOME | End: 2023-08-31
Payer: MEDICARE

## 2023-08-28 PROCEDURE — 97530 THERAPEUTIC ACTIVITIES: CPT

## 2023-08-28 PROCEDURE — 97110 THERAPEUTIC EXERCISES: CPT

## 2023-08-28 NOTE — PROGRESS NOTES
PHYSICAL THERAPY - DAILY TREATMENT NOTE    Patient Name: Ninoska Stone    Date: 2023    : 1935  Insurance: Payor: MEDICARE / Plan: MEDICARE PART A AND B / Product Type: *No Product type* /      Patient  verified YES   Visit #   Current / Total 5 16   Time   In / Out 9:10 9:48   Pain   In / Out 0-1 0-1   Subjective Functional Status/Changes: Says got injection last Friday and that went fine. She is sore in the thighs/hips. She walked 12K steps on Saturday and has been doing her HEP. TREATMENT AREA =  Other low back pain [M54.59]  Radiculopathy, lumbar region [M54.16]    OBJECTIVE        Therapeutic Procedures: Tx Min Billable or 1:1 Min (if diff from Tx Min) Procedure, Rationale, Specifics   23  60524 Therapeutic Exercise (timed):  increase ROM, strength, coordination, balance, and proprioception to improve patient's ability to progress to PLOF and address remaining functional goals. (see flow sheet as applicable)     Details if applicable:       15  19860 Therapeutic Activity (timed):  use of dynamic activities replicating functional movements to increase ROM, strength, coordination, balance, and proprioception in order to improve patient's ability to progress to PLOF and address remaining functional goals. (see flow sheet as applicable)     Details if applicable:     -   Gait Training 92065:  TM training at 2.2 mph with focus on step length and upright trunk    Details if applicable:     -   55370 Manual Therapy (timed):  decrease pain, increase ROM, and increase tissue extensibility to improve patient's ability to progress to PLOF and address remaining functional goals. The manual therapy interventions were performed at a separate and distinct time from the therapeutic activities interventions . (see flow sheet as applicable)       Details if applicable:  Supine: Bilateral hip PROm for flex and rotation. Knee PROm flex ext. Light STM lateral hip/glute med and lateral vastus.

## 2023-09-01 ENCOUNTER — HOSPITAL ENCOUNTER (OUTPATIENT)
Facility: HOSPITAL | Age: 88
Setting detail: RECURRING SERIES
Discharge: HOME | End: 2023-09-04
Payer: MEDICARE

## 2023-09-01 PROCEDURE — 97530 THERAPEUTIC ACTIVITIES: CPT

## 2023-09-01 PROCEDURE — 97110 THERAPEUTIC EXERCISES: CPT

## 2023-09-01 NOTE — PROGRESS NOTES
PHYSICAL THERAPY - DAILY TREATMENT NOTE    Patient Name: Eva Keller    Date: 2023    : 1935  Insurance: Payor: MEDICARE / Plan: MEDICARE PART A AND B / Product Type: *No Product type* /      Patient  verified YES   Visit #   Current / Total 6 16   Time   In / Out 7:00 7:38   Pain   In / Out 1 0-1   Subjective Functional Status/Changes: Reports went to [address] and went in to the weight room and maybe over did it. Sore in the thighs. Did the recumbent bike, TM, knee ext and flex machine and leg press. TREATMENT AREA =  Other low back pain [M54.59]  Radiculopathy, lumbar region [M54.16]    OBJECTIVE    Therapeutic Procedures: Tx Min Billable or 1:1 Min (if diff from Tx Min) Procedure, Rationale, Specifics   23  43351 Therapeutic Exercise (timed):  increase ROM, strength, coordination, balance, and proprioception to improve patient's ability to progress to PLOF and address remaining functional goals. (see flow sheet as applicable)     Details if applicable:       15  50945 Therapeutic Activity (timed):  use of dynamic activities replicating functional movements to increase ROM, strength, coordination, balance, and proprioception in order to improve patient's ability to progress to PLOF and address remaining functional goals. (see flow sheet as applicable)     Details if applicable:     -   Gait Training 40254:  TM training at 2.2 mph with focus on step length and upright trunk    Details if applicable:     -   82969 Manual Therapy (timed):  decrease pain, increase ROM, and increase tissue extensibility to improve patient's ability to progress to PLOF and address remaining functional goals. The manual therapy interventions were performed at a separate and distinct time from the therapeutic activities interventions . (see flow sheet as applicable)       Details if applicable:  Supine: Bilateral hip PROm for flex and rotation. Knee PROm flex ext.  Light STM lateral hip/glute med and

## 2023-09-06 ENCOUNTER — HOSPITAL ENCOUNTER (OUTPATIENT)
Facility: HOSPITAL | Age: 88
Setting detail: RECURRING SERIES
Discharge: HOME | End: 2023-09-09
Payer: MEDICARE

## 2023-09-06 PROCEDURE — 97530 THERAPEUTIC ACTIVITIES: CPT

## 2023-09-06 PROCEDURE — 97110 THERAPEUTIC EXERCISES: CPT

## 2023-09-06 NOTE — PROGRESS NOTES
PHYSICAL THERAPY - DAILY TREATMENT NOTE    Patient Name: Bonnie Sims    Date: 2023    : 1935  Insurance: Payor: MEDICARE / Plan: MEDICARE PART A AND B / Product Type: *No Product type* /      Patient  verified YES   Visit #   Current / Total 7 16   Time   In / Out 1:40 2:18   Pain   In / Out 2 0-1   Subjective Functional Status/Changes: Says she's been real busy. Says the left hip bursitis started hurting. Maybe from working on her chair re-uplostry project. TREATMENT AREA =  Other low back pain [M54.59]  Radiculopathy, lumbar region [M54.16]    OBJECTIVE    Therapeutic Procedures: Tx Min Billable or 1:1 Min (if diff from Tx Min) Procedure, Rationale, Specifics   15  93992 Therapeutic Exercise (timed):  increase ROM, strength, coordination, balance, and proprioception to improve patient's ability to progress to PLOF and address remaining functional goals. (see flow sheet as applicable)     Details if applicable:       23  93250 Therapeutic Activity (timed):  use of dynamic activities replicating functional movements to increase ROM, strength, coordination, balance, and proprioception in order to improve patient's ability to progress to PLOF and address remaining functional goals. (see flow sheet as applicable)     Details if applicable:     -   Gait Training 22820:  TM training at 2.2 mph with focus on step length and upright trunk    Details if applicable:     -   45722 Manual Therapy (timed):  decrease pain, increase ROM, and increase tissue extensibility to improve patient's ability to progress to PLOF and address remaining functional goals. The manual therapy interventions were performed at a separate and distinct time from the therapeutic activities interventions . (see flow sheet as applicable)       Details if applicable:  Supine: Bilateral hip PROm for flex and rotation. Knee PROm flex ext. Light STM lateral hip/glute med and lateral vastus.            Details if applicable:

## 2023-09-11 ENCOUNTER — HOSPITAL ENCOUNTER (OUTPATIENT)
Facility: HOSPITAL | Age: 88
Setting detail: RECURRING SERIES
Discharge: HOME | End: 2023-09-14
Payer: MEDICARE

## 2023-09-11 PROCEDURE — 97530 THERAPEUTIC ACTIVITIES: CPT

## 2023-09-11 PROCEDURE — 97110 THERAPEUTIC EXERCISES: CPT

## 2023-09-11 NOTE — PROGRESS NOTES
PHYSICAL THERAPY - DAILY TREATMENT NOTE    Patient Name: Sirisha Mehta    Date: 2023    : 1935  Insurance: Payor: MEDICARE / Plan: MEDICARE PART A AND B / Product Type: *No Product type* /      Patient  verified YES   Visit #   Current / Total 8 16   Time   In / Out 1:40 2:18   Pain   In / Out 2 0-1   Subjective Functional Status/Changes: Says the knees are ok but \"the legs\" are sore and feeling weak today. (pt rubbing lower LE). TREATMENT AREA =  Other low back pain [M54.59]  Radiculopathy, lumbar region [M54.16]    OBJECTIVE    Therapeutic Procedures: Tx Min Billable or 1:1 Min (if diff from Tx Min) Procedure, Rationale, Specifics   15  85802 Therapeutic Exercise (timed):  increase ROM, strength, coordination, balance, and proprioception to improve patient's ability to progress to PLOF and address remaining functional goals. (see flow sheet as applicable)     Details if applicable:       23  38525 Therapeutic Activity (timed):  use of dynamic activities replicating functional movements to increase ROM, strength, coordination, balance, and proprioception in order to improve patient's ability to progress to PLOF and address remaining functional goals. (see flow sheet as applicable)     Details if applicable:     -   Gait Training 22842:  TM training at 2.2 mph with focus on step length and upright trunk    Details if applicable:     -   29481 Manual Therapy (timed):  decrease pain, increase ROM, and increase tissue extensibility to improve patient's ability to progress to PLOF and address remaining functional goals. The manual therapy interventions were performed at a separate and distinct time from the therapeutic activities interventions . (see flow sheet as applicable)       Details if applicable:  Supine: Bilateral hip PROm for flex and rotation. Knee PROm flex ext. Light STM lateral hip/glute med and lateral vastus.            Details if applicable:     [address] Reminder:

## 2023-09-15 ENCOUNTER — HOSPITAL ENCOUNTER (OUTPATIENT)
Facility: HOSPITAL | Age: 88
Setting detail: RECURRING SERIES
Discharge: HOME | End: 2023-09-18
Payer: MEDICARE

## 2023-09-15 PROCEDURE — 97530 THERAPEUTIC ACTIVITIES: CPT

## 2023-09-15 PROCEDURE — 97110 THERAPEUTIC EXERCISES: CPT

## 2023-09-15 NOTE — THERAPY RECERTIFICATION
[facility] THERAPY  [address], [address],[place] [facility] - Ph: [phone]  Fx: [phone]  PHYSICAL THERAPY PROGRESS NOTE  Patient Name: Carlos Eduardo : 1935   Treatment/Medical Diagnosis:  M25.561  RIGHT KNEE PAIN and M25.562  LEFT KNEE PAIN     Referral Source: Dr Lalo Snowden. Reynold Azul     Date of Initial Visit: 23 Attended Visits: 9 Missed Visits: 0     SUMMARY OF TREATMENT  Pt seen in clinic for to address Other low back pain [M54.59]  Radiculopathy, lumbar region [M54.16]. Pt has been assessed, completed therapeutic exercises, neuromuscular re-ed, therapeutic functional activity, received manual therapy intervention, self-care strategies, HEP techniques and pt ed on condition consistency and follow-through. -    Subjective: Pt reports she is 70% of where she wants to be. She reports improvements in bending and stooping as well as no real pain recently, except after her workouts in the muscles. Pt reports continued difficulties in balance while walking and first getting up from sitting. Still can't get out of a chair easily. Says she is not just \"going out to walk\" but she walks around the yard and house. CURRENT STATUS  Pt seen for 9 visits to address R>L arthritic knee pain and loss of functional transfers. Pt has improved in stooping, bending and getting up from the floor. No change in sit-stand transfer ability, pt still requires bilateral UE support to press off typical height surfaces (18\"). We have been working this deficit over the month, as well as walking tolerance. Will continue per POC for 4 more weeks. GOALS:  Short Term Goals: To be accomplished in 4 weeks  Pt to report adherence and demonstrate compliance with basic HEP to allow progress between visits  Status at last Eval: Initiated  Current Status: Expanded (23)  Goal Met?  MET (9/15/23)  2.   Pt to report pain <3/10 at worst to allow improved function and

## 2023-09-20 ENCOUNTER — HOSPITAL ENCOUNTER (OUTPATIENT)
Facility: HOSPITAL | Age: 88
Setting detail: RECURRING SERIES
Discharge: HOME | End: 2023-09-23
Payer: MEDICARE

## 2023-09-20 PROCEDURE — 97110 THERAPEUTIC EXERCISES: CPT

## 2023-09-20 PROCEDURE — 97530 THERAPEUTIC ACTIVITIES: CPT

## 2023-09-20 NOTE — PROGRESS NOTES
PHYSICAL THERAPY - DAILY TREATMENT NOTE    Patient Name: Lucy Sweet    Date: 2023    : 1935  Insurance: Payor: MEDICARE / Plan: MEDICARE PART A AND B / Product Type: *No Product type* /      Patient  verified YES   Visit #   Current / Total 10 16   Time   In / Out 9:40 10:18   Pain   In / Out 0 0   Subjective Functional Status/Changes: \"I walked a half mile\"     TREATMENT AREA =  Other low back pain [M54.59]  Radiculopathy, lumbar region [M54.16]    OBJECTIVE    Therapeutic Procedures: Tx Min Billable or 1:1 Min (if diff from Tx Min) Procedure, Rationale, Specifics   28  04597 Therapeutic Exercise (timed):  increase ROM, strength, coordination, balance, and proprioception to improve patient's ability to progress to PLOF and address remaining functional goals. (see flow sheet as applicable)     Details if applicable:       10  84853 Therapeutic Activity (timed):  use of dynamic activities replicating functional movements to increase ROM, strength, coordination, balance, and proprioception in order to improve patient's ability to progress to PLOF and address remaining functional goals. (see flow sheet as applicable)     Details if applicable:     -   Gait Training 56411:  TM training at 2.2 mph with focus on step length and upright trunk    Details if applicable:     -   39342 Manual Therapy (timed):  decrease pain, increase ROM, and increase tissue extensibility to improve patient's ability to progress to PLOF and address remaining functional goals. The manual therapy interventions were performed at a separate and distinct time from the therapeutic activities interventions . (see flow sheet as applicable)       Details if applicable:  Supine: Bilateral hip PROm for flex and rotation. Knee PROm flex ext. Light STM lateral hip/glute med and lateral vastus.            Details if applicable:     [address] Reminder: bill using total billable min of TIMED therapeutic procedures (example:

## 2023-09-22 ENCOUNTER — HOSPITAL ENCOUNTER (OUTPATIENT)
Facility: HOSPITAL | Age: 88
Setting detail: RECURRING SERIES
Discharge: HOME | End: 2023-09-25
Payer: MEDICARE

## 2023-09-22 PROCEDURE — 97110 THERAPEUTIC EXERCISES: CPT

## 2023-09-22 PROCEDURE — 97530 THERAPEUTIC ACTIVITIES: CPT

## 2023-09-22 NOTE — PROGRESS NOTES
PHYSICAL THERAPY - DAILY TREATMENT NOTE    Patient Name: Gabriela Sliva    Date: 2023    : 1935  Insurance: Payor: MEDICARE / Plan: MEDICARE PART A AND B / Product Type: *No Product type* /      Patient  verified YES   Visit #   Current / Total 11 16   Time   In / Out 7:00 7:40   Pain   In / Out 0 0   Subjective Functional Status/Changes: Says she's getting less sore after sessions, she used to be \"wiped out\" the next day, but not so much now. TREATMENT AREA =  Other low back pain [M54.59]  Radiculopathy, lumbar region [M54.16]    OBJECTIVE    Therapeutic Procedures: Tx Min Billable or 1:1 Min (if diff from Tx Min) Procedure, Rationale, Specifics   10  44912 Therapeutic Exercise (timed):  increase ROM, strength, coordination, balance, and proprioception to improve patient's ability to progress to PLOF and address remaining functional goals. (see flow sheet as applicable)     Details if applicable:       28  58185 Therapeutic Activity (timed):  use of dynamic activities replicating functional movements to increase ROM, strength, coordination, balance, and proprioception in order to improve patient's ability to progress to PLOF and address remaining functional goals. (see flow sheet as applicable)     Details if applicable:     -   Gait Training 38756:  TM training at 2.2 mph with focus on step length and upright trunk    Details if applicable:     -   81518 Manual Therapy (timed):  decrease pain, increase ROM, and increase tissue extensibility to improve patient's ability to progress to PLOF and address remaining functional goals. The manual therapy interventions were performed at a separate and distinct time from the therapeutic activities interventions . (see flow sheet as applicable)       Details if applicable:  Supine: Bilateral hip PROm for flex and rotation. Knee PROm flex ext. Light STM lateral hip/glute med and lateral vastus.            Details if applicable:     [address] [place] [address] Street

## 2023-09-27 ENCOUNTER — HOSPITAL ENCOUNTER (OUTPATIENT)
Facility: HOSPITAL | Age: 88
Setting detail: RECURRING SERIES
Discharge: HOME | End: 2023-09-30
Payer: MEDICARE

## 2023-09-27 PROCEDURE — 97530 THERAPEUTIC ACTIVITIES: CPT

## 2023-09-27 PROCEDURE — 97110 THERAPEUTIC EXERCISES: CPT

## 2023-09-27 NOTE — PROGRESS NOTES
PHYSICAL THERAPY - DAILY TREATMENT NOTE    Patient Name: Petros Bernard    Date: 2023    : 1935  Insurance: Payor: MEDICARE / Plan: MEDICARE PART A AND B / Product Type: *No Product type* /      Patient  verified YES   Visit #   Current / Total 12 16   Time   In / Out 9:40 10:18   Pain   In / Out 0 0   Subjective Functional Status/Changes: Says she walked 2x 1 mile this week. One time for exercise, once while shopping at the store. TREATMENT AREA =  Other low back pain [M54.59]  Radiculopathy, lumbar region [M54.16]    OBJECTIVE    Therapeutic Procedures: Tx Min Billable or 1:1 Min (if diff from Tx Min) Procedure, Rationale, Specifics   10  88788 Therapeutic Exercise (timed):  increase ROM, strength, coordination, balance, and proprioception to improve patient's ability to progress to PLOF and address remaining functional goals. (see flow sheet as applicable)     Details if applicable:       28  88773 Therapeutic Activity (timed):  use of dynamic activities replicating functional movements to increase ROM, strength, coordination, balance, and proprioception in order to improve patient's ability to progress to PLOF and address remaining functional goals. (see flow sheet as applicable)     Details if applicable:     -   Gait Training 29303:  TM training at 2.2 mph with focus on step length and upright trunk    Details if applicable:     -   72358 Manual Therapy (timed):  decrease pain, increase ROM, and increase tissue extensibility to improve patient's ability to progress to PLOF and address remaining functional goals. The manual therapy interventions were performed at a separate and distinct time from the therapeutic activities interventions . (see flow sheet as applicable)       Details if applicable:  Supine: Bilateral hip PROm for flex and rotation. Knee PROm flex ext. Light STM lateral hip/glute med and lateral vastus.            Details if applicable:     [address] Reminder:

## 2023-09-29 ENCOUNTER — HOSPITAL ENCOUNTER (OUTPATIENT)
Facility: HOSPITAL | Age: 88
Setting detail: RECURRING SERIES
End: 2023-09-29
Payer: MEDICARE

## 2023-09-29 PROCEDURE — 97110 THERAPEUTIC EXERCISES: CPT

## 2023-09-29 PROCEDURE — 97530 THERAPEUTIC ACTIVITIES: CPT

## 2023-09-29 NOTE — PROGRESS NOTES
PHYSICAL THERAPY - DAILY TREATMENT NOTE    Patient Name: Ricke Mcburney    Date: 2023    : 1935  Insurance: Payor: MEDICARE / Plan: MEDICARE PART A AND B / Product Type: *No Product type* /      Patient  verified YES   Visit #   Current / Total 13 16   Time   In / Out 8:20 8:58   Pain   In / Out 2 1   Subjective Functional Status/Changes: Says achy this morning due to weather. TREATMENT AREA =  Other low back pain [M54.59]  Radiculopathy, lumbar region [M54.16]    OBJECTIVE    Therapeutic Procedures: Tx Min Billable or 1:1 Min (if diff from Tx Min) Procedure, Rationale, Specifics   15  62287 Therapeutic Exercise (timed):  increase ROM, strength, coordination, balance, and proprioception to improve patient's ability to progress to PLOF and address remaining functional goals. (see flow sheet as applicable)     Details if applicable:       23  40375 Therapeutic Activity (timed):  use of dynamic activities replicating functional movements to increase ROM, strength, coordination, balance, and proprioception in order to improve patient's ability to progress to PLOF and address remaining functional goals. (see flow sheet as applicable)     Details if applicable:     -   Gait Training 03557:  TM training at 2.2 mph with focus on step length and upright trunk    Details if applicable:     -   47504 Manual Therapy (timed):  decrease pain, increase ROM, and increase tissue extensibility to improve patient's ability to progress to PLOF and address remaining functional goals. The manual therapy interventions were performed at a separate and distinct time from the therapeutic activities interventions . (see flow sheet as applicable)       Details if applicable:  Supine: Bilateral hip PROm for flex and rotation. Knee PROm flex ext. Light STM lateral hip/glute med and lateral vastus.            Details if applicable:     [address] Reminder: bill using total billable min of TIMED therapeutic procedures (example: do not include dry needle or estim unattended, both untimed codes, in totals to left)  8-22 min = 1 unit; 23-37 min = 2 units; 38-52 min = 3 units; 53-67 min = 4 units; 68-82 min = 5 units   Total Total     [x]  Patient Education billed concurrently with other procedures   [x] Review HEP    [] Progressed/Changed HEP, detail:  Access Code: [email or web address]  URL: [email or web address]  Date: 08/18/2023  Prepared by: Lupillo Soto    [] Other detail:     Unable to STS from 21 inch surface. Dynamic and structural valgus aggravating later compartment. Objective Information/Functional Measures/Assessment    Started session on NuStep to improve tissue and joint perfusion. Able to complete most drills without regression, progressed load with Deadlift, regressed height of fwd step-up. Pt reporting some right knee discomfort on lunges with the right knee down, yet wishing to progress through them. Good effort in session. Patient will continue to benefit from skilled PT services to modify and progress therapeutic interventions, analyze and address functional mobility deficits, analyze and address ROM deficits, analyze and address strength deficits, analyze and address soft tissue restrictions, analyze and cue for proper movement patterns, and analyze and modify for postural abnormalities to address functional deficits and attain remaining goals. Progress toward goals / Updated goals:  []  See Progress Note/Recertification    Short Term Goals: To be accomplished in 4 weeks  Pt to report adherence and demonstrate compliance with basic HEP to allow progress between visits  Status at last Eval: Initiated  Current Status: Expanded (8/18/23)  Goal Met?  MET (9/15/23)  2. Pt to report pain <3/10 at worst to allow improved function and QOL  Status at last Eval: 7/10  Current Status: 0/10  Goal Met?  MET (8/21/23)  3.  Pt to report improved transfers from sit-stand 5x from typical Yes, record another set of vital signs

## 2023-10-06 ENCOUNTER — HOSPITAL ENCOUNTER (OUTPATIENT)
Facility: HOSPITAL | Age: 88
Setting detail: RECURRING SERIES
Discharge: HOME | End: 2023-10-09
Payer: MEDICARE

## 2023-10-06 PROCEDURE — 97530 THERAPEUTIC ACTIVITIES: CPT

## 2023-10-06 PROCEDURE — 97110 THERAPEUTIC EXERCISES: CPT

## 2023-10-06 NOTE — PROGRESS NOTES
PHYSICAL THERAPY - DAILY TREATMENT NOTE    Patient Name: Ryan Jefferson    Date: 10/6/2023    : 1935  Insurance: Payor: MEDICARE / Plan: MEDICARE PART A AND B / Product Type: *No Product type* /      Patient  verified YES   Visit #   Current / Total 14 16   Time   In / Out 9:00 9:38   Pain   In / Out 2 1   Subjective Functional Status/Changes: Says stiff this morning. Says she's [place] the mall. TREATMENT AREA =  Other low back pain [M54.59]  Radiculopathy, lumbar region [M54.16]    OBJECTIVE    Therapeutic Procedures: Tx Min Billable or 1:1 Min (if diff from Tx Min) Procedure, Rationale, Specifics   10  61341 Therapeutic Exercise (timed):  increase ROM, strength, coordination, balance, and proprioception to improve patient's ability to progress to PLOF and address remaining functional goals. (see flow sheet as applicable)     Details if applicable:       28  81955 Therapeutic Activity (timed):  use of dynamic activities replicating functional movements to increase ROM, strength, coordination, balance, and proprioception in order to improve patient's ability to progress to PLOF and address remaining functional goals. (see flow sheet as applicable)     Details if applicable:     -   Gait Training 38808:  TM training at 2.2 mph with focus on step length and upright trunk    Details if applicable:     -   51922 Manual Therapy (timed):  decrease pain, increase ROM, and increase tissue extensibility to improve patient's ability to progress to PLOF and address remaining functional goals. The manual therapy interventions were performed at a separate and distinct time from the therapeutic activities interventions . (see flow sheet as applicable)       Details if applicable:  Supine: Bilateral hip PROm for flex and rotation. Knee PROm flex ext. Light STM lateral hip/glute med and lateral vastus.            Details if applicable:     [address] Reminder: bill using total billable min of

## 2023-10-09 ENCOUNTER — HOSPITAL ENCOUNTER (OUTPATIENT)
Facility: HOSPITAL | Age: 88
Setting detail: RECURRING SERIES
Discharge: HOME | End: 2023-10-12
Payer: MEDICARE

## 2023-10-09 PROCEDURE — 97530 THERAPEUTIC ACTIVITIES: CPT

## 2023-10-09 PROCEDURE — 97110 THERAPEUTIC EXERCISES: CPT

## 2023-10-09 NOTE — PROGRESS NOTES
PHYSICAL THERAPY - DAILY TREATMENT NOTE    Patient Name: Ninoska Sotne    Date: 10/9/2023    : 1935  Insurance: Payor: MEDICARE / Plan: MEDICARE PART A AND B / Product Type: *No Product type* /      Patient  verified YES   Visit #   Current / Total 15 16   Time   In / Out 9:40 10:20   Pain   In / Out 1 1   Subjective Functional Status/Changes: Stiff this morning because of the cold. TREATMENT AREA =  Other low back pain [M54.59]  Radiculopathy, lumbar region [M54.16]    OBJECTIVE    Therapeutic Procedures: Tx Min Billable or 1:1 Min (if diff from Tx Min) Procedure, Rationale, Specifics   10  99838 Therapeutic Exercise (timed):  increase ROM, strength, coordination, balance, and proprioception to improve patient's ability to progress to PLOF and address remaining functional goals. (see flow sheet as applicable)     Details if applicable:       30  11155 Therapeutic Activity (timed):  use of dynamic activities replicating functional movements to increase ROM, strength, coordination, balance, and proprioception in order to improve patient's ability to progress to PLOF and address remaining functional goals. (see flow sheet as applicable)     Details if applicable:     -   Gait Training 21863:  TM training at 2.2 mph with focus on step length and upright trunk    Details if applicable:     -   28381 Manual Therapy (timed):  decrease pain, increase ROM, and increase tissue extensibility to improve patient's ability to progress to PLOF and address remaining functional goals. The manual therapy interventions were performed at a separate and distinct time from the therapeutic activities interventions . (see flow sheet as applicable)       Details if applicable:  Supine: Bilateral hip PROm for flex and rotation. Knee PROm flex ext. Light STM lateral hip/glute med and lateral vastus.            Details if applicable:     [address] Reminder: bill using total billable min of TIMED therapeutic

## 2023-10-13 ENCOUNTER — HOSPITAL ENCOUNTER (OUTPATIENT)
Facility: HOSPITAL | Age: 88
Setting detail: RECURRING SERIES
End: 2023-10-13
Payer: MEDICARE

## 2023-10-16 ENCOUNTER — HOSPITAL ENCOUNTER (OUTPATIENT)
Facility: HOSPITAL | Age: 88
Setting detail: RECURRING SERIES
Discharge: HOME | End: 2023-10-19
Payer: MEDICARE

## 2023-10-16 PROCEDURE — 97110 THERAPEUTIC EXERCISES: CPT

## 2023-10-16 PROCEDURE — 97530 THERAPEUTIC ACTIVITIES: CPT

## 2023-10-16 NOTE — THERAPY DISCHARGE
PT DISCHARGE DAILY NOTE AND SUMMARY    Date:10/16/2023  Patient name: Ninoska Stone Start of Care: 23   Referral source: Dr Cassie Clinton MD : 1935   Medical/Treatment Diagnosis: M25.561  RIGHT KNEE PAIN and M25.562  LEFT KNEE PAIN   Onset Date: \"Years\" of knee pain, chronic     Prior Hospitalization: see medical history Provider#: [id number]   Comorbidities: Scoliosis, OA, osteoporosis, LBP, DM, GI disease, HTN, sleep dysfunction  Prior Level of Function:Can walk 1/2 mile, can't stoop down or get out of a normal chair without using her arms. Insurance: Payor: MEDICARE / Plan: MEDICARE PART A AND B / Product Type: *No Product type* /      Visits from Start of Care: 16  Missed Visits: 1    Reporting Period : 9/15/23 to 10/13/23    Patient  verified YES    Visit #   Current / Total 16 16   Time   In / Out 1:42 4:20   Pain   In / Out 0 0   Subjective Functional Status/Changes: Says been feeling good. She will go to the [facility] center to keep it up. She had a great birthday party and also was able to wear heels in [place] bc her legs felt strong enough. Changes to:  Meds, Allergies, Med Hx, Sx Hx? If yes, update Summary List NO *see subj/obj for changes prn*       TREATMENT AREA =  Other low back pain [M54.59]  Radiculopathy, lumbar region [M54.16]    OBJECTIVE  Therapeutic Procedures: Tx Min Billable or 1:1 Min (if diff from Tx Min) Procedure, Rationale, Specifics   NB  *63623 Self Care/Home Management (timed):  improve patient knowledge and understanding of pain reducing techniques, positioning, activity modification, and DC planning, to improve patient's ability to progress after DC in HEP and return towards PLOF  (see flow sheet as applicable)     Details if applicable:     15  28432 Therapeutic Exercise (timed):  increase ROM, strength, coordination, balance, and proprioception to improve patient's ability to progress to PLOF and address remaining functional goals.  (see flow sheet as applicable)

## 2024-04-01 ENCOUNTER — TELEPHONE (OUTPATIENT)
Facility: HOSPITAL | Age: 89
End: 2024-04-01

## 2024-04-03 ENCOUNTER — HOSPITAL ENCOUNTER (OUTPATIENT)
Facility: HOSPITAL | Age: 89
Setting detail: RECURRING SERIES
Discharge: HOME | End: 2024-04-06
Payer: MEDICARE

## 2024-04-03 PROCEDURE — 97161 PT EVAL LOW COMPLEX 20 MIN: CPT

## 2024-04-03 PROCEDURE — 97535 SELF CARE MNGMENT TRAINING: CPT

## 2024-04-03 PROCEDURE — 97110 THERAPEUTIC EXERCISES: CPT

## 2024-04-03 NOTE — PROGRESS NOTES
PHYSICAL / OCCUPATIONAL THERAPY - DAILY TREATMENT NOTE (updated )  For Eval visit    Patient Name: Lashonda Franco    Date: 4/3/2024    : 1935  Insurance: Payor: MEDICARE / Plan: MEDICARE PART A AND B / Product Type: *No Product type* /      Patient  verified yes     Visit #   Current / Total 1 24   Time   In / Out 12:25 1:05   Pain   In / Out 7/10 7/10   Subjective Functional Status/Changes: See POC     TREATMENT AREA =  Other low back pain [M54.59]  Neck pain [M54.2]    OBJECTIVE           15 min   Eval - untimed                      Therapeutic Procedures:  Tx Min Billable or 1:1 Min (if diff from Tx Min) Procedure, Rationale, Specifics   10  09390 Self Care/Home Management (timed):  improve patient knowledge and understanding of pain reducing techniques, positioning, posture/ergonomics, home safety, activity modification, diagnosis/prognosis, and physical therapy expectations, procedures and progression  to improve patient's ability to progress to PLOF and address remaining functional goals.  (see flow sheet as applicable)     Details if applicable:    Reviewed diagnosis, prognosis, therapy progression   Reviewed and educated on HEP    15  59336 Therapeutic Exercise (timed):  increase ROM, strength, coordination, balance, and proprioception to improve patient's ability to progress to PLOF and address remaining functional goals. (see flow sheet as applicable)     Details if applicable:    Review of updated HEP    [x]   assessing strength/ROM  []   assessing activity performance (ex: sit to stand, stair negotiation)  []   assessing balance/control (ex. Stubbs, DGI, SLS)          Details if applicable:      []   assessing strength/ROM  []   assessing activity performance (ex: sit to stand, stair negotiation)  []   assessing balance/control (ex. Stubbs, DGI, SLS)          Details if applicable:      []   assessing strength/ROM  []   assessing activity performance (ex: sit to stand, stair

## 2024-04-03 NOTE — THERAPY EVALUATION
[facility]  [address], [address], [place] [address] Ph:[phone] Fx: [phone]  Plan of Care / Statement of Necessity for Physical Therapy Services     Patient Name: Lashonda Franco : 1935   Medical   Diagnosis:  Other low back pain [M54.59]  Neck pain [M54.2] Treatment Diagnosis: M54.2  NECK PAIN and M54.59  OTHER LOWER BACK PAIN   Onset Date: chronic     Referral Source: Pina Londono APRN Start of Care (SOC): 4/3/2024   Prior Hospitalization: See medical history Provider #: [id number]   Prior Level of Function:   Could bend over and ADLs like laundry & cleaning with no symptoms      Comorbidities: Scoliosis, OA, osteoporosis, LBP, DM, GI disease, HTN, Sleep dysfunction, B knee OA     Assessment / key information:  Lashonda Franco is a 88 y.o. female who presents to skilled PT for the treatment diagnosis of neck and lower back pain. She has previously been seen at this location for the same back issue and made good progress with PT. The neck issue is new. She reports having arthritis in the lower back. Had an injection about 8 months ago and that helped. The Pt also went very well. The back started bothering her again about 3-4 months ago. Pain is mostly located in the lower back and can occasionally get some N/T in the sides of the lower legs about 1-2x per day. Sitting does not cause a lot of pain. When going to stand up, there is immediate pain in the lower back. Pt can perform standing activities for typically about 20 min before the pain gets bad and needs to take a break.   Denies sig weakness in the legs   Pt also endorses neck pain, but it does not bother her as much as her back. Denies N/T except for one instance she had numbness across the chest and down both arms. But test for that are ongoing for this, but the neck is not her primary concern at this time.     MRI FINDINGS:  per 23    There is scoliotic curvature

## 2024-04-10 ENCOUNTER — HOSPITAL ENCOUNTER (OUTPATIENT)
Facility: HOSPITAL | Age: 89
Setting detail: RECURRING SERIES
Discharge: HOME | End: 2024-04-13
Payer: MEDICARE

## 2024-04-10 PROCEDURE — 97140 MANUAL THERAPY 1/> REGIONS: CPT

## 2024-04-10 PROCEDURE — 97110 THERAPEUTIC EXERCISES: CPT

## 2024-04-10 PROCEDURE — 97530 THERAPEUTIC ACTIVITIES: CPT

## 2024-04-10 NOTE — PROGRESS NOTES
PHYSICAL THERAPY - DAILY TREATMENT NOTE    Patient Name: Lashonda Franco    Date: 4/10/2024    : 1935  Insurance: Payor: MEDICARE / Plan: MEDICARE PART A AND B / Product Type: *No Product type* /      Patient  verified YES   Visit #   Current / Total 2 24   Time   In / Out 7:40 8:18   Pain   In / Out 7-8 7   Subjective Functional Status/Changes: Says worse in the mornings. She has a new doctor and that doctor wants a new MRI.        TREATMENT AREA =  Other low back pain [M54.59]  Neck pain [M54.2]    OBJECTIVE        Therapeutic Procedures:  Tx Min Billable or 1:1 Min (if diff from Tx Min) Procedure, Rationale, Specifics   18  62117 Therapeutic Exercise (timed):  increase ROM, strength, coordination, balance, and proprioception to improve patient's ability to progress to PLOF and address remaining functional goals. (see flow sheet as applicable)     Details if applicable:       10  58767 Therapeutic Activity (timed):  use of dynamic activities replicating functional movements to increase ROM, strength, coordination, balance, and proprioception in order to improve patient's ability to progress to PLOF and address remaining functional goals.  (see flow sheet as applicable)     Details if applicable:     10  06117 Manual Therapy (timed):  decrease pain, increase ROM, increase tissue extensibility, decrease trigger points, and increase postural awareness to improve patient's ability to progress to PLOF and address remaining functional goals.  The manual therapy interventions were performed at a separate and distinct time from the therapeutic activities interventions . (see flow sheet as applicable)     Details if applicable:  Supine: DTM holds L5/S1 bilateral          Details if applicable:            Details if applicable:     38  [facility] Totals Reminder: bill using total billable min of TIMED therapeutic procedures (example: do not include dry needle or estim unattended, both untimed codes, in totals to

## 2024-04-18 ENCOUNTER — HOSPITAL ENCOUNTER (OUTPATIENT)
Facility: HOSPITAL | Age: 89
Setting detail: RECURRING SERIES
Discharge: HOME | End: 2024-04-21
Payer: MEDICARE

## 2024-04-18 PROCEDURE — 97530 THERAPEUTIC ACTIVITIES: CPT

## 2024-04-18 PROCEDURE — 97140 MANUAL THERAPY 1/> REGIONS: CPT

## 2024-04-18 PROCEDURE — 97110 THERAPEUTIC EXERCISES: CPT

## 2024-04-18 NOTE — PROGRESS NOTES
PHYSICAL THERAPY - DAILY TREATMENT NOTE    Patient Name: Lashonda Franco    Date: 2024    : 1935  Insurance: Payor: MEDICARE / Plan: MEDICARE PART A AND B / Product Type: *No Product type* /      Patient  verified YES   Visit #   Current / Total 3 24   Time   In / Out 4:15 4:53   Pain   In / Out 0 0   Subjective Functional Status/Changes:   She said they did an MRI for the neck. She'll find out about it later. She tok some pain meds today and that helps.So no pain bc of that.       TREATMENT AREA =  Other low back pain [M54.59]  Neck pain [M54.2]    OBJECTIVE        Therapeutic Procedures:  Tx Min Billable or 1:1 Min (if diff from Tx Min) Procedure, Rationale, Specifics   18  77409 Therapeutic Exercise (timed):  increase ROM, strength, coordination, balance, and proprioception to improve patient's ability to progress to PLOF and address remaining functional goals. (see flow sheet as applicable)     Details if applicable:       12  34874 Therapeutic Activity (timed):  use of dynamic activities replicating functional movements to increase ROM, strength, coordination, balance, and proprioception in order to improve patient's ability to progress to PLOF and address remaining functional goals.  (see flow sheet as applicable)     Details if applicable:     8  92093 Manual Therapy (timed):  decrease pain, increase ROM, increase tissue extensibility, decrease trigger points, and increase postural awareness to improve patient's ability to progress to PLOF and address remaining functional goals.  The manual therapy interventions were performed at a separate and distinct time from the therapeutic activities interventions . (see flow sheet as applicable)     Details if applicable:  Supine: STM to lumbar with LTR          Details if applicable:            Details if applicable:     38  [facility] Totals Reminder: bill using total billable min of TIMED therapeutic procedures (example: do not include dry needle or

## 2024-04-25 ENCOUNTER — HOSPITAL ENCOUNTER (OUTPATIENT)
Facility: HOSPITAL | Age: 89
Setting detail: RECURRING SERIES
Discharge: HOME | End: 2024-04-28
Payer: MEDICARE

## 2024-04-25 PROCEDURE — 97530 THERAPEUTIC ACTIVITIES: CPT

## 2024-04-25 PROCEDURE — 97140 MANUAL THERAPY 1/> REGIONS: CPT

## 2024-04-25 PROCEDURE — 97110 THERAPEUTIC EXERCISES: CPT

## 2024-04-25 NOTE — PROGRESS NOTES
PHYSICAL THERAPY - DAILY TREATMENT NOTE    Patient Name: Lashonda Franco    Date: 2024    : 1935  Insurance: Payor: MEDICARE / Plan: MEDICARE PART A AND B / Product Type: *No Product type* /      Patient  verified YES   Visit #   Current / Total 4 24   Time   In / Out 8:20 9:00   Pain   In / Out 2/10  2/10   Subjective Functional Status/Changes: Always stiff in the morning. The back is achy     TREATMENT AREA =  Other low back pain [M54.59]  Neck pain [M54.2]    OBJECTIVE        Therapeutic Procedures:  Tx Min Billable or 1:1 Min (if diff from Tx Min) Procedure, Rationale, Specifics   15  33107 Therapeutic Exercise (timed):  increase ROM, strength, coordination, balance, and proprioception to improve patient's ability to progress to PLOF and address remaining functional goals. (see flow sheet as applicable)     Details if applicable:       10  31549 Therapeutic Activity (timed):  use of dynamic activities replicating functional movements to increase ROM, strength, coordination, balance, and proprioception in order to improve patient's ability to progress to PLOF and address remaining functional goals.  (see flow sheet as applicable)     Details if applicable:     15  62572 Manual Therapy (timed):  decrease pain, increase ROM, increase tissue extensibility, decrease trigger points, and increase postural awareness to improve patient's ability to progress to PLOF and address remaining functional goals.  The manual therapy interventions were performed at a separate and distinct time from the therapeutic activities interventions . (see flow sheet as applicable)     Details if applicable:    R S/L: DTM LS para, TPR L QL, CPA L1-4 gr 3  Supine: B hip Er stretching          Details if applicable:            Details if applicable:     40  [facility] Totals Reminder: bill using total billable min of TIMED therapeutic procedures (example: do not include dry needle or estim unattended, both untimed codes, in

## 2024-04-29 ENCOUNTER — HOSPITAL ENCOUNTER (OUTPATIENT)
Facility: HOSPITAL | Age: 89
Setting detail: RECURRING SERIES
Discharge: HOME | End: 2024-05-02
Payer: MEDICARE

## 2024-04-29 PROCEDURE — 97110 THERAPEUTIC EXERCISES: CPT

## 2024-04-29 PROCEDURE — 97530 THERAPEUTIC ACTIVITIES: CPT

## 2024-04-29 NOTE — PROGRESS NOTES
unattended, both untimed codes, in totals to left)  8-22 min = 1 unit; 23-37 min = 2 units; 38-52 min = 3 units; 53-67 min = 4 units; 68-82 min = 5 units   Total Total     [x]  Patient Education billed concurrently with other procedures   [x] Review HEP    [] Progressed/Changed HEP, detail:    [] Other detail:       Objective Information/Functional Measures/Assessment    Started session with active lumbar AROM, t/s and c/s AROM then lumbar stretches. Progressed loading in session with sit-stands (knees bother her_, DL and curtsy step-ups. Core drills and stretches on hi-lo to finish.   PN NV    Patient will continue to benefit from skilled PT services to modify and progress therapeutic interventions, analyze and address functional mobility deficits, analyze and address ROM deficits, analyze and address strength deficits, analyze and address soft tissue restrictions, analyze and cue for proper movement patterns, and analyze and modify for postural abnormalities to address functional deficits and attain remaining goals.    Progress toward goals / Updated goals:  []  See Progress Note/Recertification    Short Term Goals: To be accomplished in 6 weeks  1) Pt will be IND with HEP to facilitate self care management.   EVAL: Provided HEP  Current Status: -Compliant to date (4/18/24)  Goal Met?  - Ongoing  2) Pt will improve lumbar ROM to >75% all planes in order to improve ability to perform ADLs with improved ROM and less restriction.    EVAL:   Thoracolumbar AROM (with 100% being full ROM)   Flexion: 100%  Extension: 50%  (L) SB: 50%  (R) SB: 75%  (L) Rot: 50%  (R) Rot: 50%  Current Status: - good improvement in B rot noted today, 4/25/24  Goal Met?  -  3) Pt will improve worst pain levels from initial evaluation level of 10/10 to 7/10 to show improved QOL and improved overall perception of pain-free/more pain-free function with ADLs.   EVAL: worst pain 10/10  Current Status: -  Goal Met?  -     Long Term Goals: To be

## 2024-05-01 ENCOUNTER — HOSPITAL ENCOUNTER (OUTPATIENT)
Facility: HOSPITAL | Age: 89
Setting detail: RECURRING SERIES
Discharge: HOME | End: 2024-05-04
Payer: MEDICARE

## 2024-05-01 PROCEDURE — 97110 THERAPEUTIC EXERCISES: CPT

## 2024-05-01 NOTE — THERAPY RECERTIFICATION
[facility]  [address],[place] [address] - Ph: [phone]  Fx: [phone]  PHYSICAL THERAPY PROGRESS NOTE  Patient Name: Lashonda Franco : 1935   Treatment/Medical Diagnosis: Other low back pain [M54.59]  Neck pain [M54.2] / M54.2  NECK PAIN and M54.59  OTHER LOWER BACK PAIN    Referral Source: Pina Londono APRN     Date of Initial Visit: 4/3/24 Attended Visits: 6 Missed Visits: 0     SUMMARY OF TREATMENT  Pt seen in clinic for to address Other low back pain [M54.59]  Neck pain [M54.2]. Pt has been assessed, completed therapeutic exercises, neuromuscular re-ed, therapeutic functional activity, received manual therapy intervention, self-care strategies, HEP techniques and pt ed on condition consistency and follow-through. -    Subjective: Pt reports 60% of where patient wants to be. Pt reports improvements in reduced frequency of pain pain, she can stand longer before the back hurts. The neck never bothers her. Pt reports continued difficulties in back and knee pain.   Says she swam yesterday in the pool and she is sore in all new places. She's hurting overall.     CURRENT STATUS  Pt seen for 6 visits to address low back pain with good progress to date. Medication is aiding in pain control as is her movement routine. Pt will still get pains with more activity, and she progresses her tasks the knees and back are aggravated. Will continue plan to calm things down and build things back up.     GOALS:  Short Term Goals: To be accomplished in 6 weeks  1) Pt will be IND with HEP to facilitate self care management.   EVAL: Provided HEP  Current Status: -Compliant to date (24)  Goal Met?  - Ongoing  2) Pt will improve lumbar ROM to >75% all planes in order to improve ability to perform ADLs with improved ROM and less restriction.    EVAL:   Thoracolumbar AROM (with 100% being full ROM)   Flexion: 100%  Extension: 50%  (L)

## 2024-05-01 NOTE — PROGRESS NOTES
Appointments   Date Time Provider Department Center   5/1/2024  7:40 AM Matt Rios, PT [facility]   5/6/2024  9:00 AM Matt Rios, PT [facility]   5/8/2024 10:20 AM Jose L Causey, PT [facility]   5/14/2024  9:00 AM Jose L Causey, PT [facility]   5/16/2024  9:00 AM Jose L Causey, PT [facility]   5/21/2024  9:00 AM Jose L Causey, PT [facility]   5/23/2024  9:00 AM Jose L Causey, PT [facility]   5/29/2024  9:40 AM Matt Rios, PT [facility]   5/31/2024  7:40 AM Matt Rios, PT [facility]

## 2024-05-06 ENCOUNTER — HOSPITAL ENCOUNTER (OUTPATIENT)
Facility: HOSPITAL | Age: 89
Setting detail: RECURRING SERIES
Discharge: HOME | End: 2024-05-09
Payer: MEDICARE

## 2024-05-06 PROCEDURE — 97110 THERAPEUTIC EXERCISES: CPT

## 2024-05-08 ENCOUNTER — HOSPITAL ENCOUNTER (OUTPATIENT)
Facility: HOSPITAL | Age: 89
Setting detail: RECURRING SERIES
Discharge: HOME | End: 2024-05-11
Payer: MEDICARE

## 2024-05-08 PROCEDURE — 97530 THERAPEUTIC ACTIVITIES: CPT

## 2024-05-08 PROCEDURE — 97110 THERAPEUTIC EXERCISES: CPT

## 2024-05-08 NOTE — PROGRESS NOTES
PHYSICAL THERAPY - DAILY TREATMENT NOTE    Patient Name: Lashonda Franco    Date: 2024    : 1935  Insurance: Payor: MEDICARE / Plan: MEDICARE PART A AND B / Product Type: *No Product type* /      Patient  verified YES   Visit #   Current / Total 8 24   Time   In / Out 10:20 11:00   Pain   In / Out 0/10  010   Subjective Functional Status/Changes: Still dealing with a sinus infection. On antibiotics. The back and neck are feeling fine. Feeling it more in the L lower leg.      TREATMENT AREA =  Other low back pain [M54.59]  Neck pain [M54.2]    OBJECTIVE    Therapeutic Procedures:  Tx Min Billable or 1:1 Min (if diff from Tx Min) Procedure, Rationale, Specifics   40  33091 Therapeutic Exercise (timed):  increase ROM, strength, coordination, balance, and proprioception to improve patient's ability to progress to PLOF and address remaining functional goals. (see flow sheet as applicable)     Details if applicable:    Supine: B hip Er stretching and HS stretching     65881 Therapeutic Activity (timed):  use of dynamic activities replicating functional movements to increase ROM, strength, coordination, balance, and proprioception in order to improve patient's ability to progress to PLOF and address remaining functional goals.  (see flow sheet as applicable)     Details if applicable:       99579 Manual Therapy (timed):  decrease pain, increase ROM, increase tissue extensibility, decrease trigger points, and increase postural awareness to improve patient's ability to progress to PLOF and address remaining functional goals.  The manual therapy interventions were performed at a separate and distinct time from the therapeutic activities interventions . (see flow sheet as applicable)     Details if applicable:              Details if applicable:            Details if applicable:     40  [facility] Totals Reminder: bill using total billable min of TIMED therapeutic procedures (example: do not include dry needle

## 2024-05-14 ENCOUNTER — HOSPITAL ENCOUNTER (OUTPATIENT)
Facility: HOSPITAL | Age: 89
Setting detail: RECURRING SERIES
Discharge: HOME | End: 2024-05-17
Payer: MEDICARE

## 2024-05-14 PROCEDURE — 97112 NEUROMUSCULAR REEDUCATION: CPT

## 2024-05-14 PROCEDURE — 97110 THERAPEUTIC EXERCISES: CPT

## 2024-05-14 PROCEDURE — 97530 THERAPEUTIC ACTIVITIES: CPT

## 2024-05-14 NOTE — PROGRESS NOTES
PHYSICAL THERAPY - DAILY TREATMENT NOTE    Patient Name: Lashonda Franco    Date: 2024    : 1935  Insurance: Payor: MEDICARE / Plan: MEDICARE PART A AND B / Product Type: *No Product type* /      Patient  verified YES   Visit #   Current / Total 9 24   Time   In / Out 9:00 9:40   Pain   In / Out 0/10  0/10   Subjective Functional Status/Changes: Saw the neck doctor and they area concerned about the arthritis but she has no in the pain.      TREATMENT AREA =  Other low back pain [M54.59]  Neck pain [M54.2]    OBJECTIVE    Therapeutic Procedures:  Tx Min Billable or 1:1 Min (if diff from Tx Min) Procedure, Rationale, Specifics   15  28990 Therapeutic Exercise (timed):  increase ROM, strength, coordination, balance, and proprioception to improve patient's ability to progress to PLOF and address remaining functional goals. (see flow sheet as applicable)     Details if applicable:    Supine: B hip Er, ADD, HS stretching   15  18151 Neuromuscular Re-Education (timed):  improve balance, coordination, kinesthetic sense, posture, core stability and proprioception to improve patient's ability to develop conscious control of individual muscles and awareness of position of extremities in order to progress to PLOF and address remaining functional goals. (see flow sheet as applicable)     Details if applicable:     10  34511 Therapeutic Activity (timed):  use of dynamic activities replicating functional movements to increase ROM, strength, coordination, balance, and proprioception in order to improve patient's ability to progress to PLOF and address remaining functional goals.  (see flow sheet as applicable)     Details if applicable:              Details if applicable:            Details if applicable:     40  [facility] Totals Reminder: bill using total billable min of TIMED therapeutic procedures (example: do not include dry needle or estim unattended, both untimed codes, in totals to left)  8-22 min = 1 unit;

## 2024-05-16 ENCOUNTER — HOSPITAL ENCOUNTER (OUTPATIENT)
Facility: HOSPITAL | Age: 89
Setting detail: RECURRING SERIES
Discharge: HOME | End: 2024-05-19
Payer: MEDICARE

## 2024-05-16 PROCEDURE — 97112 NEUROMUSCULAR REEDUCATION: CPT

## 2024-05-16 PROCEDURE — 97110 THERAPEUTIC EXERCISES: CPT

## 2024-05-16 PROCEDURE — 97530 THERAPEUTIC ACTIVITIES: CPT

## 2024-05-16 NOTE — PROGRESS NOTES
PHYSICAL THERAPY - DAILY TREATMENT NOTE    Patient Name: Lashonda Franco    Date: 2024    : 1935  Insurance: Payor: MEDICARE / Plan: MEDICARE PART A AND B / Product Type: *No Product type* /      Patient  verified YES   Visit #   Current / Total 10 24   Time   In / Out 9:00 9:40   Pain   In / Out 0/10  010   Subjective Functional Status/Changes: Things are going well today.      TREATMENT AREA =  Other low back pain [M54.59]  Neck pain [M54.2]    OBJECTIVE    Therapeutic Procedures:  Tx Min Billable or 1:1 Min (if diff from Tx Min) Procedure, Rationale, Specifics   15  01783 Therapeutic Exercise (timed):  increase ROM, strength, coordination, balance, and proprioception to improve patient's ability to progress to PLOF and address remaining functional goals. (see flow sheet as applicable)     Details if applicable:    Supine: B hip Er, ADD, HS stretching   15  56947 Neuromuscular Re-Education (timed):  improve balance, coordination, kinesthetic sense, posture, core stability and proprioception to improve patient's ability to develop conscious control of individual muscles and awareness of position of extremities in order to progress to PLOF and address remaining functional goals. (see flow sheet as applicable)     Details if applicable:     10  39621 Therapeutic Activity (timed):  use of dynamic activities replicating functional movements to increase ROM, strength, coordination, balance, and proprioception in order to improve patient's ability to progress to PLOF and address remaining functional goals.  (see flow sheet as applicable)     Details if applicable:              Details if applicable:            Details if applicable:     40  [facility] Totals Reminder: bill using total billable min of TIMED therapeutic procedures (example: do not include dry needle or estim unattended, both untimed codes, in totals to left)  8-22 min = 1 unit; 23-37 min = 2 units; 38-52 min = 3 units; 53-67 min = 4 units;

## 2024-05-21 ENCOUNTER — HOSPITAL ENCOUNTER (OUTPATIENT)
Facility: HOSPITAL | Age: 89
Setting detail: RECURRING SERIES
Discharge: HOME | End: 2024-05-24
Payer: MEDICARE

## 2024-05-21 PROCEDURE — 97112 NEUROMUSCULAR REEDUCATION: CPT

## 2024-05-21 PROCEDURE — 97530 THERAPEUTIC ACTIVITIES: CPT

## 2024-05-21 PROCEDURE — 97110 THERAPEUTIC EXERCISES: CPT

## 2024-05-21 NOTE — PROGRESS NOTES
PHYSICAL THERAPY - DAILY TREATMENT NOTE    Patient Name: Lashonda Franco    Date: 2024    : 1935  Insurance: Payor: MEDICARE / Plan: MEDICARE PART A AND B / Product Type: *No Product type* /      Patient  verified YES   Visit #   Current / Total 11 24   Time   In / Out 9:02 9:40   Pain   In / Out 0/10 010   Subjective Functional Status/Changes: The back continues to be okay. Some back pain over the weekend. The more she uses it, she can feel it.      TREATMENT AREA =  Other low back pain [M54.59]  Neck pain [M54.2]    OBJECTIVE    Therapeutic Procedures:  Tx Min Billable or 1:1 Min (if diff from Tx Min) Procedure, Rationale, Specifics   13  [id number] Therapeutic Exercise (timed):  increase ROM, strength, coordination, balance, and proprioception to improve patient's ability to progress to PLOF and address remaining functional goals. (see flow sheet as applicable)     Details if applicable:    Supine: B hip Er   15  86052 Neuromuscular Re-Education (timed):  improve balance, coordination, kinesthetic sense, posture, core stability and proprioception to improve patient's ability to develop conscious control of individual muscles and awareness of position of extremities in order to progress to PLOF and address remaining functional goals. (see flow sheet as applicable)     Details if applicable:     10  91161 Therapeutic Activity (timed):  use of dynamic activities replicating functional movements to increase ROM, strength, coordination, balance, and proprioception in order to improve patient's ability to progress to PLOF and address remaining functional goals.  (see flow sheet as applicable)     Details if applicable:              Details if applicable:            Details if applicable:     38  [facility] Totals Reminder: bill using total billable min of TIMED therapeutic procedures (example: do not include dry needle or estim unattended, both untimed codes, in totals to left)  8-22 min = 1 unit; 23-37 min

## 2024-05-23 ENCOUNTER — HOSPITAL ENCOUNTER (OUTPATIENT)
Facility: HOSPITAL | Age: 89
Setting detail: RECURRING SERIES
Discharge: HOME | End: 2024-05-26
Payer: MEDICARE

## 2024-05-23 PROCEDURE — 97110 THERAPEUTIC EXERCISES: CPT

## 2024-05-23 PROCEDURE — 97530 THERAPEUTIC ACTIVITIES: CPT

## 2024-05-23 PROCEDURE — 97112 NEUROMUSCULAR REEDUCATION: CPT

## 2024-05-23 NOTE — PROGRESS NOTES
PHYSICAL THERAPY - DAILY TREATMENT NOTE    Patient Name: Lashonda Franco    Date: 2024    : 1935  Insurance: Payor: MEDICARE / Plan: MEDICARE PART A AND B / Product Type: *No Product type* /      Patient  verified YES   Visit #   Current / Total 12 24   Time   In / Out 9:02 9:40   Pain   In / Out 0/10 0/10   Subjective Functional Status/Changes: Knees are doing better today. No pain in the back today.      TREATMENT AREA =  Other low back pain [M54.59]  Neck pain [M54.2]    OBJECTIVE    Therapeutic Procedures:  Tx Min Billable or 1:1 Min (if diff from Tx Min) Procedure, Rationale, Specifics   10  [id number] Therapeutic Exercise (timed):  increase ROM, strength, coordination, balance, and proprioception to improve patient's ability to progress to PLOF and address remaining functional goals. (see flow sheet as applicable)     Details if applicable:       15  92849 Neuromuscular Re-Education (timed):  improve balance, coordination, kinesthetic sense, posture, core stability and proprioception to improve patient's ability to develop conscious control of individual muscles and awareness of position of extremities in order to progress to PLOF and address remaining functional goals. (see flow sheet as applicable)     Details if applicable:     13  71828 Therapeutic Activity (timed):  use of dynamic activities replicating functional movements to increase ROM, strength, coordination, balance, and proprioception in order to improve patient's ability to progress to PLOF and address remaining functional goals.  (see flow sheet as applicable)     Details if applicable:              Details if applicable:            Details if applicable:     38  [facility] Totals Reminder: bill using total billable min of TIMED therapeutic procedures (example: do not include dry needle or estim unattended, both untimed codes, in totals to left)  8-22 min = 1 unit; 23-37 min = 2 units; 38-52 min = 3 units; 53-67 min = 4 units; 68-82 min

## 2024-05-29 ENCOUNTER — HOSPITAL ENCOUNTER (OUTPATIENT)
Facility: HOSPITAL | Age: 89
Setting detail: RECURRING SERIES
Discharge: HOME | End: 2024-06-01
Payer: MEDICARE

## 2024-05-29 PROCEDURE — 97530 THERAPEUTIC ACTIVITIES: CPT

## 2024-05-29 PROCEDURE — 97112 NEUROMUSCULAR REEDUCATION: CPT

## 2024-05-29 PROCEDURE — 97110 THERAPEUTIC EXERCISES: CPT

## 2024-05-29 NOTE — PROGRESS NOTES
PHYSICAL THERAPY - DAILY TREATMENT NOTE    Patient Name: Lashonda Franco    Date: 2024    : 1935  Insurance: Payor: MEDICARE / Plan: MEDICARE PART A AND B / Product Type: *No Product type* /      Patient  verified YES   Visit #   Current / Total 13 24   Time   In / Out 9:40 10:18   Pain   In / Out 0/10 0/10   Subjective Functional Status/Changes: When she gets moving it feels better. Still bad when she's still. Always better with movement. More of a management thing.     Pt considering ablation of nerves in her back due to functional limitations she is experiencing.      TREATMENT AREA =  Other low back pain [M54.59]  Neck pain [M54.2]    OBJECTIVE    Therapeutic Procedures:  Tx Min Billable or 1:1 Min (if diff from Tx Min) Procedure, Rationale, Specifics   10  [id number] Therapeutic Exercise (timed):  increase ROM, strength, coordination, balance, and proprioception to improve patient's ability to progress to PLOF and address remaining functional goals. (see flow sheet as applicable)     Details if applicable:       15  48330 Neuromuscular Re-Education (timed):  improve balance, coordination, kinesthetic sense, posture, core stability and proprioception to improve patient's ability to develop conscious control of individual muscles and awareness of position of extremities in order to progress to PLOF and address remaining functional goals. (see flow sheet as applicable)     Details if applicable:     13  69696 Therapeutic Activity (timed):  use of dynamic activities replicating functional movements to increase ROM, strength, coordination, balance, and proprioception in order to improve patient's ability to progress to PLOF and address remaining functional goals.  (see flow sheet as applicable)     Details if applicable:              Details if applicable:            Details if applicable:     38  [facility] Totals Reminder: bill using total billable min of TIMED therapeutic procedures (example: do not

## 2024-05-31 ENCOUNTER — HOSPITAL ENCOUNTER (OUTPATIENT)
Facility: HOSPITAL | Age: 89
Setting detail: RECURRING SERIES
End: 2024-05-31
Payer: MEDICARE

## 2024-05-31 PROCEDURE — 97110 THERAPEUTIC EXERCISES: CPT

## 2024-05-31 PROCEDURE — 97535 SELF CARE MNGMENT TRAINING: CPT

## 2024-05-31 PROCEDURE — 97530 THERAPEUTIC ACTIVITIES: CPT

## 2024-05-31 NOTE — THERAPY DISCHARGE
PT DISCHARGE DAILY NOTE AND SUMMARY    Date:2024  Patient name: Lashonda Franco Start of Care: 4/3/24   Referral source: Pina Londono APRN : 1935   Medical/Treatment Diagnosis: Other low back pain [M54.59]  Neck pain [M54.2] M54.59  OTHER LOWER BACK PAIN  Onset Date:chronic     Prior Hospitalization: see medical history Provider#: [id number]   Comorbidities: Scoliosis, OA, osteoporosis, LBP, DM, GI disease, HTN, Sleep dysfunction, B knee OA   Prior Level of Function:Could bend over and ADLs like laundry & cleaning with no symptoms   Insurance: Payor: MEDICARE / Plan: MEDICARE PART A AND B / Product Type: *No Product type* /      Visits from Start of Care: 14 Missed Visits: 0    Reporting Period : 24 to 3124  Cert Period: 4/3/24 - 24  Patient  verified YES    Visit #   Current / Total 14 14   Time   In / Out 7:40 8:20   Pain   In / Out 8 3   Subjective Functional Status/Changes: Says the back is a little flared up this week. Says she will keep doing what she can do and go to the gym and the pool. Reports she is 80% of where she wants to be. It fluctuates. Has good days and bad days.    Changes to:  Meds, Allergies, Med Hx, Sx Hx?  If yes, update Summary List NO *see subj/obj for changes prn*       TREATMENT AREA =  Other low back pain [M54.59]  Neck pain [M54.2]    OBJECTIVE         Therapeutic Procedures:  Tx Min Billable or 1:1 Min (if diff from Tx Min) Procedure, Rationale, Specifics   10  *[id number] Self Care/Home Management (timed):  improve patient knowledge and understanding of pain reducing techniques, positioning, activity modification, and DC planning, to improve patient's ability to progress after DC in HEP and return towards PLOF  (see flow sheet as applicable)     Details if applicable:       [id number] Therapeutic Exercise (timed):  increase ROM, strength, coordination, balance, and proprioception to improve patient's ability to progress to PLOF and address remaining functional  goals. (see flow sheet as applicable)     Details if applicable:       10  52461 Therapeutic Activity (timed):  use of dynamic activities replicating functional movements to increase ROM, strength, coordination, balance, and proprioception in order to improve patient's ability to progress to PLOF and address remaining functional goals.  (see flow sheet as applicable)     Details if applicable:              Details if applicable:     40  [facility] Totals Reminder: bill using total billable min of TIMED therapeutic procedures (example: do not include dry needle or estim unattended, both untimed codes, in totals to left)  8-22 min = 1 unit; 23-37 min = 2 units; 38-52 min = 3 units; 53-67 min = 4 units; 68-82 min = 5 units   Total Total     [x]  Patient Education billed concurrently with other procedures   [x] Review HEP    [] Progressed/Changed HEP, detail:    [] Other detail:       Objective Information/Functional Measures/Assessment    Pt seen for 14 visits to address chronic low back pain with more good weeks than bad yet symptoms do cycle and return. Pt has benefited from movement based therapy yet is seeking further medical intervention at this time. Based on pt progress and current status I am recommending pt DC to self maintenance and HEP. Both parties agreed to DC plan. All questions answered.     GOALS    Short Term Goals: To be accomplished in 6 weeks  1) Pt will be IND with HEP to facilitate self care management.   EVAL: Provided HEP  Current Status: -Compliant to date (5/6/24)  Goal Met?  - Ongoing  2) Pt will improve lumbar ROM to >75% all planes in order to improve ability to perform ADLs with improved ROM and less restriction.    EVAL:   Thoracolumbar AROM (with 100% being full ROM)   Flexion: 100%  Extension: 50%  (L) SB: 50%  (R) SB: 75%  (L) Rot: 50%  (R) Rot: 50%  Current Status: 5/31/24  - Flexion: Finger-tips to floor \"stiff/pain\" (100%),   Extension: 30 deg, no pain (100%)  Side Bend: 17 deg